# Patient Record
Sex: FEMALE | Race: WHITE | NOT HISPANIC OR LATINO | URBAN - METROPOLITAN AREA
[De-identification: names, ages, dates, MRNs, and addresses within clinical notes are randomized per-mention and may not be internally consistent; named-entity substitution may affect disease eponyms.]

---

## 2019-06-25 PROBLEM — I50.9 ACUTE DECOMPENSATED HEART FAILURE: Status: ACTIVE | Noted: 2019-06-25

## 2019-06-25 NOTE — PLAN OF CARE
(Physician in Lead of Transfers)   Outside Transfer Acceptance Note / Regional Referral Center    Transferring Physician: Dr. Melisa Garcia    Accepting Physician: Lashay Suárez MD    Date of Acceptance: 06/25/2019    Transferring Facility: [facility] in [place], [place]    Reason for Transfer: decompensated liver failure    Report from Transferring Physician/Hospital course: Pt is a 38 female with PMH of alcoholic cirrhosis (last drink 6/3) who was admitted to OSH 6/6 with abdominal pain, nausea, decreased oral intake, and visibly jaundice.  Over past 2.5 weeks, pt has been diagnosed with and treated for SBP.  She has been continued on diuretics, but ascites has been resistant to this, requiring multiple paracenteses.  Most recent paracentesis did not meet criteria for SBP, and had no PMNs, but pt remains on Rocephin for leukocytosis of unclear source.  She has been encephalopathic but now improving with increased doses of Lactulose and Rifaxamin.  Pt has not improved with current treatment, thus requesting liver transplant evaluation. Case d/w Dr. Posadas    VS:  T 97   bp 96/57   HR 80   RR 20   98% on RA    Labs:  WBC 21 (down-trending from 26), H/H 10.3/27.5, PLT 71  INR 1.9  Na 124, K 3.9, BUN/creat 76/0.9  TB 25.9 (DB 16.9), Alb 2.7, TP 6.5, , AST 92, ALT 15    To Do List: admit to IM-L if possible; continue diuretics, lactulose, Rifaxamin; consult Hepatology, consult Addiction Psychiatry, vit K x 3    Upon patient arrival to floor, please call extension [phone] (if no answer, this will flip to a beeper, so enter your call back number) for Hospital Medicine admit team assignment and for additional admit orders for the patient.  Do not page the attending physician associated with the patient on arrival (this physician may not be on duty at the time of arrival).  Rather, always call [phone] to reach the triage physician for orders and team assignment.    Lashay Suárez MD  [facility]  Medicine Staff

## 2019-06-26 ENCOUNTER — HOSPITAL ENCOUNTER (INPATIENT)
Facility: HOSPITAL | Age: 39
LOS: 9 days | Discharge: HOME | DRG: 441 | End: 2019-07-05
Attending: HOSPITALIST | Admitting: HOSPITALIST
Payer: COMMERCIAL

## 2019-06-26 DIAGNOSIS — Z76.82 ORGAN TRANSPLANT CANDIDATE: ICD-10-CM

## 2019-06-26 DIAGNOSIS — K72.00 ACUTE LIVER FAILURE WITHOUT HEPATIC COMA: ICD-10-CM

## 2019-06-26 DIAGNOSIS — K70.31 ALCOHOLIC CIRRHOSIS OF LIVER WITH ASCITES: ICD-10-CM

## 2019-06-26 DIAGNOSIS — K92.0 COFFEE GROUND EMESIS: Primary | ICD-10-CM

## 2019-06-26 DIAGNOSIS — Z01.818 PRE-TRANSPLANT EVALUATION FOR LIVER TRANSPLANT: ICD-10-CM

## 2019-06-26 DIAGNOSIS — I50.9 ACUTE DECOMPENSATED HEART FAILURE: ICD-10-CM

## 2019-06-26 DIAGNOSIS — F51.04 CHRONIC INSOMNIA: ICD-10-CM

## 2019-06-26 DIAGNOSIS — K72.90 LIVER FAILURE: ICD-10-CM

## 2019-06-26 DIAGNOSIS — F10.20 ALCOHOL USE DISORDER, SEVERE, DEPENDENCE: ICD-10-CM

## 2019-06-26 PROBLEM — D68.9 COAGULOPATHY: Status: ACTIVE | Noted: 2019-06-26

## 2019-06-26 PROBLEM — K70.11 ASCITES DUE TO ALCOHOLIC HEPATITIS: Status: ACTIVE | Noted: 2019-06-26

## 2019-06-26 PROBLEM — D68.4 DEFICIENCY OF COAGULATION FACTOR DUE TO LIVER DISEASE: Status: ACTIVE | Noted: 2019-06-26

## 2019-06-26 PROBLEM — K70.10 ACUTE ALCOHOLIC HEPATITIS: Status: ACTIVE | Noted: 2019-06-26

## 2019-06-26 PROBLEM — D53.9 MACROCYTIC ANEMIA: Status: ACTIVE | Noted: 2019-06-26

## 2019-06-26 PROBLEM — R60.1 ANASARCA: Status: ACTIVE | Noted: 2019-06-26

## 2019-06-26 PROBLEM — K76.82 HEPATIC ENCEPHALOPATHY: Status: ACTIVE | Noted: 2019-06-26

## 2019-06-26 PROBLEM — I95.9 HYPOTENSION: Status: ACTIVE | Noted: 2019-06-26

## 2019-06-26 PROBLEM — D73.1 THROMBOCYTOPENIA DUE TO HYPERSPLENISM: Status: ACTIVE | Noted: 2019-06-26

## 2019-06-26 PROBLEM — E87.1 HYPONATREMIA: Status: ACTIVE | Noted: 2019-06-26

## 2019-06-26 PROBLEM — D72.829 LEUKOCYTOSIS: Status: ACTIVE | Noted: 2019-06-26

## 2019-06-26 PROBLEM — D69.59 THROMBOCYTOPENIA DUE TO HYPERSPLENISM: Status: ACTIVE | Noted: 2019-06-26

## 2019-06-26 PROBLEM — F17.200 TOBACCO USE DISORDER: Status: ACTIVE | Noted: 2019-06-26

## 2019-06-26 LAB
ABO + RH BLD: NORMAL
ALBUMIN FLD-MCNC: <0.3 G/DL
ALBUMIN SERPL BCP-MCNC: 2 G/DL (ref 3.5–5.2)
ALP SERPL-CCNC: 345 U/L (ref 55–135)
ALT SERPL W/O P-5'-P-CCNC: 15 U/L (ref 10–44)
AMPHET+METHAMPHET UR QL: NEGATIVE
ANION GAP SERPL CALC-SCNC: 10 MMOL/L (ref 8–16)
ANION GAP SERPL CALC-SCNC: 12 MMOL/L (ref 8–16)
ANISOCYTOSIS BLD QL SMEAR: SLIGHT
APPEARANCE FLD: CLEAR
AST SERPL-CCNC: 96 U/L (ref 10–40)
B-HCG UR QL: NEGATIVE
BARBITURATES UR QL SCN>200 NG/ML: NEGATIVE
BASOPHILS # BLD AUTO: 0.1 K/UL (ref 0–0.2)
BASOPHILS NFR BLD: 0.4 % (ref 0–1.9)
BENZODIAZ UR QL SCN>200 NG/ML: NEGATIVE
BILIRUB DIRECT SERPL-MCNC: >14 MG/DL (ref 0.1–0.3)
BILIRUB SERPL-MCNC: 28.4 MG/DL (ref 0.1–1)
BLD GP AB SCN CELLS X3 SERPL QL: NORMAL
BODY FLD TYPE: NORMAL
BUN SERPL-MCNC: 64 MG/DL (ref 6–20)
BUN SERPL-MCNC: 64 MG/DL (ref 6–20)
BURR CELLS BLD QL SMEAR: ABNORMAL
BZE UR QL SCN: NEGATIVE
CALCIUM SERPL-MCNC: 9.6 MG/DL (ref 8.7–10.5)
CALCIUM SERPL-MCNC: 9.7 MG/DL (ref 8.7–10.5)
CANNABINOIDS UR QL SCN: NEGATIVE
CHLORIDE SERPL-SCNC: 86 MMOL/L (ref 95–110)
CHLORIDE SERPL-SCNC: 88 MMOL/L (ref 95–110)
CO2 SERPL-SCNC: 18 MMOL/L (ref 23–29)
CO2 SERPL-SCNC: 22 MMOL/L (ref 23–29)
COLOR FLD: YELLOW
CREAT SERPL-MCNC: 1.3 MG/DL (ref 0.5–1.4)
CREAT SERPL-MCNC: 1.6 MG/DL (ref 0.5–1.4)
CREAT UR-MCNC: 53 MG/DL (ref 15–325)
CREAT UR-MCNC: 53 MG/DL (ref 15–325)
DIFFERENTIAL METHOD: ABNORMAL
EOSINOPHIL # BLD AUTO: 0.4 K/UL (ref 0–0.5)
EOSINOPHIL NFR BLD: 1.6 % (ref 0–8)
ERYTHROCYTE [DISTWIDTH] IN BLOOD BY AUTOMATED COUNT: 17.4 % (ref 11.5–14.5)
EST. GFR  (AFRICAN AMERICAN): 46.8 ML/MIN/1.73 M^2
EST. GFR  (AFRICAN AMERICAN): >60 ML/MIN/1.73 M^2
EST. GFR  (NON AFRICAN AMERICAN): 40.6 ML/MIN/1.73 M^2
EST. GFR  (NON AFRICAN AMERICAN): 52.2 ML/MIN/1.73 M^2
ETHANOL UR-MCNC: <10 MG/DL
FIBRINOGEN PPP-MCNC: 287 MG/DL (ref 182–366)
GLUCOSE SERPL-MCNC: 72 MG/DL (ref 70–110)
GLUCOSE SERPL-MCNC: 99 MG/DL (ref 70–110)
GRAM STN SPEC: NORMAL
GRAM STN SPEC: NORMAL
HAV IGM SERPL QL IA: NEGATIVE
HBV CORE IGM SERPL QL IA: NEGATIVE
HBV SURFACE AG SERPL QL IA: NEGATIVE
HCT VFR BLD AUTO: 30.3 % (ref 37–48.5)
HCV AB SERPL QL IA: NEGATIVE
HGB BLD-MCNC: 10.4 G/DL (ref 12–16)
HIV 1+2 AB+HIV1 P24 AG SERPL QL IA: NEGATIVE
IGA SERPL-MCNC: 998 MG/DL (ref 40–350)
IGG SERPL-MCNC: 1140 MG/DL (ref 650–1600)
IGM SERPL-MCNC: 120 MG/DL (ref 50–300)
IMM GRANULOCYTES # BLD AUTO: 0.22 K/UL (ref 0–0.04)
IMM GRANULOCYTES NFR BLD AUTO: 0.9 % (ref 0–0.5)
INR PPP: 2.4 (ref 0.8–1.2)
LDH SERPL L TO P-CCNC: 211 U/L (ref 110–260)
LYMPHOCYTES # BLD AUTO: 1.1 K/UL (ref 1–4.8)
LYMPHOCYTES NFR BLD: 4.7 % (ref 18–48)
LYMPHOCYTES NFR FLD MANUAL: 25 %
MAGNESIUM SERPL-MCNC: 2.1 MG/DL (ref 1.6–2.6)
MCH RBC QN AUTO: 34.9 PG (ref 27–31)
MCHC RBC AUTO-ENTMCNC: 34.3 G/DL (ref 32–36)
MCV RBC AUTO: 102 FL (ref 82–98)
MESOTHL CELL NFR FLD MANUAL: 25 %
METHADONE UR QL SCN>300 NG/ML: NEGATIVE
MONOCYTES # BLD AUTO: 1.2 K/UL (ref 0.3–1)
MONOCYTES NFR BLD: 5.3 % (ref 4–15)
MONOS+MACROS NFR FLD MANUAL: 31 %
NEUTROPHILS # BLD AUTO: 20.4 K/UL (ref 1.8–7.7)
NEUTROPHILS NFR BLD: 87.1 % (ref 38–73)
NEUTROPHILS NFR FLD MANUAL: 19 %
NRBC BLD-RTO: 0 /100 WBC
OPIATES UR QL SCN: NORMAL
OSMOLALITY SERPL: 274 MOSM/KG (ref 275–295)
OSMOLALITY UR: 331 MOSM/KG (ref 50–1200)
PCP UR QL SCN>25 NG/ML: NEGATIVE
PHOSPHATE SERPL-MCNC: 6.2 MG/DL (ref 2.7–4.5)
PLATELET # BLD AUTO: 54 K/UL (ref 150–350)
PLATELET BLD QL SMEAR: ABNORMAL
PMV BLD AUTO: 13.7 FL (ref 9.2–12.9)
POIKILOCYTOSIS BLD QL SMEAR: SLIGHT
POTASSIUM SERPL-SCNC: 3.9 MMOL/L (ref 3.5–5.1)
POTASSIUM SERPL-SCNC: 4 MMOL/L (ref 3.5–5.1)
PROCALCITONIN SERPL IA-MCNC: 0.4 NG/ML
PROT FLD-MCNC: <1 G/DL
PROT SERPL-MCNC: 5.8 G/DL (ref 6–8.4)
PROTHROMBIN TIME: 23.4 SEC (ref 9–12.5)
RBC # BLD AUTO: 2.98 M/UL (ref 4–5.4)
SCHISTOCYTES BLD QL SMEAR: PRESENT
SODIUM SERPL-SCNC: 118 MMOL/L (ref 136–145)
SODIUM SERPL-SCNC: 118 MMOL/L (ref 136–145)
SODIUM UR-SCNC: <20 MMOL/L (ref 20–250)
SPECIMEN SOURCE: NORMAL
SPECIMEN SOURCE: NORMAL
TOXICOLOGY INFORMATION: NORMAL
WBC # BLD AUTO: 23.39 K/UL (ref 3.9–12.7)
WBC # FLD: 20 /CU MM

## 2019-06-26 PROCEDURE — 83615 LACTATE (LD) (LDH) ENZYME: CPT

## 2019-06-26 PROCEDURE — 84157 ASSAY OF PROTEIN OTHER: CPT

## 2019-06-26 PROCEDURE — 99222 1ST HOSP IP/OBS MODERATE 55: CPT | Mod: ,,, | Performed by: INTERNAL MEDICINE

## 2019-06-26 PROCEDURE — 93005 ELECTROCARDIOGRAM TRACING: CPT

## 2019-06-26 PROCEDURE — 36410 VNPNXR 3YR/> PHY/QHP DX/THER: CPT

## 2019-06-26 PROCEDURE — 84100 ASSAY OF PHOSPHORUS: CPT

## 2019-06-26 PROCEDURE — 82784 ASSAY IGA/IGD/IGG/IGM EACH: CPT | Mod: 59

## 2019-06-26 PROCEDURE — 82306 VITAMIN D 25 HYDROXY: CPT

## 2019-06-26 PROCEDURE — 93010 ELECTROCARDIOGRAM REPORT: CPT | Mod: ,,, | Performed by: INTERNAL MEDICINE

## 2019-06-26 PROCEDURE — 87040 BLOOD CULTURE FOR BACTERIA: CPT | Mod: 59

## 2019-06-26 PROCEDURE — 20600001 HC STEP DOWN PRIVATE ROOM

## 2019-06-26 PROCEDURE — 25000003 PHARM REV CODE 250: Performed by: PHYSICIAN ASSISTANT

## 2019-06-26 PROCEDURE — 86704 HEP B CORE ANTIBODY TOTAL: CPT

## 2019-06-26 PROCEDURE — 83735 ASSAY OF MAGNESIUM: CPT

## 2019-06-26 PROCEDURE — 89051 BODY FLUID CELL COUNT: CPT

## 2019-06-26 PROCEDURE — 63600175 PHARM REV CODE 636 W HCPCS: Performed by: HOSPITALIST

## 2019-06-26 PROCEDURE — 86850 RBC ANTIBODY SCREEN: CPT

## 2019-06-26 PROCEDURE — 82042 OTHER SOURCE ALBUMIN QUAN EA: CPT

## 2019-06-26 PROCEDURE — 82248 BILIRUBIN DIRECT: CPT

## 2019-06-26 PROCEDURE — 86790 VIRUS ANTIBODY NOS: CPT

## 2019-06-26 PROCEDURE — 84134 ASSAY OF PREALBUMIN: CPT

## 2019-06-26 PROCEDURE — 99222 PR INITIAL HOSPITAL CARE,LEVL II: ICD-10-PCS | Mod: ,,, | Performed by: INTERNAL MEDICINE

## 2019-06-26 PROCEDURE — 86706 HEP B SURFACE ANTIBODY: CPT

## 2019-06-26 PROCEDURE — 85610 PROTHROMBIN TIME: CPT

## 2019-06-26 PROCEDURE — 83930 ASSAY OF BLOOD OSMOLALITY: CPT

## 2019-06-26 PROCEDURE — 86592 SYPHILIS TEST NON-TREP QUAL: CPT

## 2019-06-26 PROCEDURE — 80074 ACUTE HEPATITIS PANEL: CPT

## 2019-06-26 PROCEDURE — 99223 1ST HOSP IP/OBS HIGH 75: CPT | Mod: ,,, | Performed by: HOSPITALIST

## 2019-06-26 PROCEDURE — 99223 PR INITIAL HOSPITAL CARE,LEVL III: ICD-10-PCS | Mod: ,,, | Performed by: HOSPITALIST

## 2019-06-26 PROCEDURE — 80048 BASIC METABOLIC PNL TOTAL CA: CPT

## 2019-06-26 PROCEDURE — 85384 FIBRINOGEN ACTIVITY: CPT

## 2019-06-26 PROCEDURE — C1751 CATH, INF, PER/CENT/MIDLINE: HCPCS

## 2019-06-26 PROCEDURE — 36415 COLL VENOUS BLD VENIPUNCTURE: CPT

## 2019-06-26 PROCEDURE — 76937 US GUIDE VASCULAR ACCESS: CPT

## 2019-06-26 PROCEDURE — 86038 ANTINUCLEAR ANTIBODIES: CPT

## 2019-06-26 PROCEDURE — 86920 COMPATIBILITY TEST SPIN: CPT

## 2019-06-26 PROCEDURE — 25000003 PHARM REV CODE 250: Performed by: HOSPITALIST

## 2019-06-26 PROCEDURE — 80053 COMPREHEN METABOLIC PANEL: CPT

## 2019-06-26 PROCEDURE — 85025 COMPLETE CBC W/AUTO DIFF WBC: CPT

## 2019-06-26 PROCEDURE — 99000 SPECIMEN HANDLING OFFICE-LAB: CPT

## 2019-06-26 PROCEDURE — 93010 EKG 12-LEAD: ICD-10-PCS | Mod: ,,, | Performed by: INTERNAL MEDICINE

## 2019-06-26 PROCEDURE — 86682 HELMINTH ANTIBODY: CPT

## 2019-06-26 PROCEDURE — 87205 SMEAR GRAM STAIN: CPT

## 2019-06-26 PROCEDURE — 86703 HIV-1/HIV-2 1 RESULT ANTBDY: CPT

## 2019-06-26 PROCEDURE — 80321 ALCOHOLS BIOMARKERS 1OR 2: CPT

## 2019-06-26 PROCEDURE — 86787 VARICELLA-ZOSTER ANTIBODY: CPT

## 2019-06-26 PROCEDURE — 84145 PROCALCITONIN (PCT): CPT

## 2019-06-26 PROCEDURE — 84300 ASSAY OF URINE SODIUM: CPT

## 2019-06-26 PROCEDURE — 86644 CMV ANTIBODY: CPT

## 2019-06-26 PROCEDURE — 86235 NUCLEAR ANTIGEN ANTIBODY: CPT

## 2019-06-26 PROCEDURE — 81025 URINE PREGNANCY TEST: CPT

## 2019-06-26 PROCEDURE — 80307 DRUG TEST PRSMV CHEM ANLYZR: CPT

## 2019-06-26 PROCEDURE — 86256 FLUORESCENT ANTIBODY TITER: CPT | Mod: 91

## 2019-06-26 PROCEDURE — 83935 ASSAY OF URINE OSMOLALITY: CPT

## 2019-06-26 RX ORDER — LIDOCAINE 50 MG/G
2 PATCH TOPICAL
Status: DISCONTINUED | OUTPATIENT
Start: 2019-06-26 | End: 2019-07-05 | Stop reason: HOSPADM

## 2019-06-26 RX ORDER — THIAMINE HCL 100 MG
100 TABLET ORAL DAILY
Status: DISCONTINUED | OUTPATIENT
Start: 2019-06-26 | End: 2019-07-05 | Stop reason: HOSPADM

## 2019-06-26 RX ORDER — ACETAMINOPHEN 325 MG/1
325 TABLET ORAL EVERY 8 HOURS PRN
Status: DISCONTINUED | OUTPATIENT
Start: 2019-06-26 | End: 2019-07-05 | Stop reason: HOSPADM

## 2019-06-26 RX ORDER — IBUPROFEN 200 MG
16 TABLET ORAL
Status: DISCONTINUED | OUTPATIENT
Start: 2019-06-26 | End: 2019-07-05 | Stop reason: HOSPADM

## 2019-06-26 RX ORDER — SYRING-NEEDL,DISP,INSUL,0.3 ML 29 G X1/2"
296 SYRINGE, EMPTY DISPOSABLE MISCELLANEOUS ONCE
Status: ON HOLD | COMMUNITY
End: 2019-07-05 | Stop reason: HOSPADM

## 2019-06-26 RX ORDER — MIDODRINE HYDROCHLORIDE 5 MG/1
5 TABLET ORAL 3 TIMES DAILY
Status: DISCONTINUED | OUTPATIENT
Start: 2019-06-26 | End: 2019-06-27

## 2019-06-26 RX ORDER — SPIRONOLACTONE 100 MG/1
100 TABLET, FILM COATED ORAL DAILY
Status: ON HOLD | COMMUNITY
End: 2019-07-05 | Stop reason: HOSPADM

## 2019-06-26 RX ORDER — RAMELTEON 8 MG/1
8 TABLET ORAL NIGHTLY PRN
Status: DISCONTINUED | OUTPATIENT
Start: 2019-06-26 | End: 2019-07-05 | Stop reason: HOSPADM

## 2019-06-26 RX ORDER — TRAMADOL HYDROCHLORIDE 50 MG/1
50 TABLET ORAL EVERY 8 HOURS PRN
Status: DISCONTINUED | OUTPATIENT
Start: 2019-06-26 | End: 2019-06-26

## 2019-06-26 RX ORDER — OMEPRAZOLE 20 MG/1
20 CAPSULE, DELAYED RELEASE ORAL DAILY
COMMUNITY

## 2019-06-26 RX ORDER — POLYETHYLENE GLYCOL 3350 17 G/17G
17 POWDER, FOR SOLUTION ORAL 2 TIMES DAILY PRN
Status: DISCONTINUED | OUTPATIENT
Start: 2019-06-26 | End: 2019-07-05 | Stop reason: HOSPADM

## 2019-06-26 RX ORDER — MULTIVITAMIN
1 TABLET ORAL DAILY
COMMUNITY

## 2019-06-26 RX ORDER — PANTOPRAZOLE SODIUM 40 MG/1
40 TABLET, DELAYED RELEASE ORAL DAILY
Status: DISCONTINUED | OUTPATIENT
Start: 2019-06-27 | End: 2019-06-27

## 2019-06-26 RX ORDER — SODIUM CHLORIDE 0.9 % (FLUSH) 0.9 %
5 SYRINGE (ML) INJECTION
Status: DISCONTINUED | OUTPATIENT
Start: 2019-06-26 | End: 2019-07-05 | Stop reason: HOSPADM

## 2019-06-26 RX ORDER — CIPROFLOXACIN 500 MG/1
500 TABLET ORAL EVERY 24 HOURS
Status: DISCONTINUED | OUTPATIENT
Start: 2019-06-26 | End: 2019-06-27

## 2019-06-26 RX ORDER — GLUCAGON 1 MG
1 KIT INJECTION
Status: DISCONTINUED | OUTPATIENT
Start: 2019-06-26 | End: 2019-07-05 | Stop reason: HOSPADM

## 2019-06-26 RX ORDER — TRAMADOL HYDROCHLORIDE 50 MG/1
50 TABLET ORAL EVERY 6 HOURS PRN
Status: DISCONTINUED | OUTPATIENT
Start: 2019-06-26 | End: 2019-07-05 | Stop reason: HOSPADM

## 2019-06-26 RX ORDER — IBUPROFEN 200 MG
24 TABLET ORAL
Status: DISCONTINUED | OUTPATIENT
Start: 2019-06-26 | End: 2019-07-05 | Stop reason: HOSPADM

## 2019-06-26 RX ORDER — FOLIC ACID 1 MG/1
1 TABLET ORAL DAILY
Status: DISCONTINUED | OUTPATIENT
Start: 2019-06-26 | End: 2019-07-05 | Stop reason: HOSPADM

## 2019-06-26 RX ORDER — LACTULOSE 10 G/15ML
20 SOLUTION ORAL 3 TIMES DAILY
Status: DISCONTINUED | OUTPATIENT
Start: 2019-06-26 | End: 2019-06-27

## 2019-06-26 RX ORDER — PROMETHAZINE HYDROCHLORIDE 12.5 MG/1
12.5 TABLET ORAL EVERY 6 HOURS PRN
Status: DISCONTINUED | OUTPATIENT
Start: 2019-06-26 | End: 2019-07-05 | Stop reason: HOSPADM

## 2019-06-26 RX ADMIN — PROMETHAZINE HYDROCHLORIDE 12.5 MG: 12.5 TABLET ORAL at 09:06

## 2019-06-26 RX ADMIN — MIDODRINE HYDROCHLORIDE 5 MG: 5 TABLET ORAL at 09:06

## 2019-06-26 RX ADMIN — TRAMADOL HYDROCHLORIDE 50 MG: 50 TABLET, FILM COATED ORAL at 02:06

## 2019-06-26 RX ADMIN — RAMELTEON 8 MG: 8 TABLET, FILM COATED ORAL at 09:06

## 2019-06-26 RX ADMIN — RIFAXIMIN 550 MG: 550 TABLET ORAL at 09:06

## 2019-06-26 RX ADMIN — CIPROFLOXACIN HYDROCHLORIDE 500 MG: 500 TABLET, FILM COATED ORAL at 07:06

## 2019-06-26 RX ADMIN — LACTULOSE 20 G: 20 SOLUTION ORAL at 05:06

## 2019-06-26 RX ADMIN — LACTULOSE 20 G: 20 SOLUTION ORAL at 09:06

## 2019-06-26 RX ADMIN — PROMETHAZINE HYDROCHLORIDE 12.5 MG: 12.5 TABLET ORAL at 02:06

## 2019-06-26 RX ADMIN — Medication 100 MG: at 11:06

## 2019-06-26 RX ADMIN — PHYTONADIONE 10 MG: 10 INJECTION, EMULSION INTRAMUSCULAR; INTRAVENOUS; SUBCUTANEOUS at 11:06

## 2019-06-26 RX ADMIN — MIDODRINE HYDROCHLORIDE 5 MG: 5 TABLET ORAL at 05:06

## 2019-06-26 RX ADMIN — FOLIC ACID 1 MG: 1 TABLET ORAL at 11:06

## 2019-06-26 RX ADMIN — TRAMADOL HYDROCHLORIDE 50 MG: 50 TABLET, FILM COATED ORAL at 09:06

## 2019-06-26 NOTE — H&P
History and Physical   Hospital Medicine     Patient Name: Destinee Gallagher  MRN:  [id number]  Hospital Medicine Team: Networked reference to record PCT  Marisa Banks MD  Date of Admission:  6/26/2019     Principal Problem:  Acute liver failure without hepatic coma   Primary Care Physician: Harsha Larson      History of Present Illness:     Ms. Destinee Gallagher is a 38 y.o. female with hx of ETOH cirrhosis and ETOH hepatitis, hx of alcohol abuse disorder/ severe alcohol dependence, prior history of coagulopathy and thrombocytopenia, as well as history of SBP, [place] [facility] [place] as a transfer from  [facility] in [place], [place], for liver transplantation evaluation as well as hepatology evaluation. Patient was admitted to OSH 6/6 with abdominal pain, nausea, decreased oral intake, and visibly jaundice.  Over past 2.5 weeks there , pt has been diagnosed with and treated for SBP.  She has been continued on diuretics, but ascites has been resistant to diuretics , requiring multiple paracenteses (last LVP several days prior to transfer).  Most recent paracentesis did not meet criteria for SBP, and had no PMNs, but pt remained on Rocephin for leukocytosis of unclear source.  She has been encephalopathic but slowly improving with increased doses of Lactulose and Rifaxamin.  Pt has not improved with current treatment, thus requesting liver transplant evaluation, Case d/w Dr. Posadas, patient transferred to [facility]. MELD 35 upon presentation at [facility]. Last ETOH drink on 6/3    Upon presentation at [facility], patient was afebrile with systolic blood pressures in the 90s satting 95% on room air.  She was mildly encephalopathic, however was able to participate in conversation,  at bedside.  Meld 35 on presentation.  Labs notable for total bilirubin of 28, sodium of 118 (patient has been getting in diuretics at the outside hospital), INR of 2.4.  WBC of 23, platelets 54.     Upon further  questioning regarding patient's disease process, she stated that she has had history of heavy alcohol use, most recently 1-2 pt of vodka per day.  Patient's last alcoholic drink was on 06/03/2019, approximately.  Patient did not state that she has been binge drinking, as noted above 2 pt of vodka per day, prior to her admission at outside hospital.  Patient also uses tobacco.  She stated that she was 1st diagnosed with alcoholic cirrhosis and alcoholic hepatitis in August 2018 and was advised to stop drinking at that time.  Patient has had multiple relapses since then.  It appears the patient has failed multiple alcohol rehabs in the past.  In the last couple of months patient has had recurrent problem with worsening ascites and abdominal distention.    Review of Systems   Constitutional: Negative for chills, , fever. +fatigue  HENT: Negative for sore throat, trouble swallowing.    Eyes: Negative for photophobia, visual disturbance.   Respiratory: Negative for cough, shortness of breath.    Cardiovascular: Negative for chest pain, palpitations, leg swelling.   Gastrointestinal: Negative for , constipation, diarrhea, nausea, vomiting. + abdominal pain, abd distension   Endocrine: Negative for cold intolerance, heat intolerance.   Genitourinary: Negative for dysuria, frequency.   Musculoskeletal: Negative for arthralgias, myalgias.   Skin: Negative for rash, wound, erythema   + jaundice   Neurological: Negative for dizziness, syncope,    +weakness, light-headedness.   Psychiatric/Behavioral: Negative for confusion, hallucinations, anxiety  All other systems reviewed and are negative.      Past Medical History:   Past Medical History:   Diagnosis Date    Acute alcoholic hepatitis 6/26/2019    Acute liver failure without hepatic coma 6/25/2019    Alcohol use disorder, severe, dependence 6/26/2019    Coagulopathy 6/26/2019    Hepatic encephalopathy 6/26/2019    Hyponatremia 6/26/2019    Thrombocytopenia due to  hypersplenism 2019       Past Surgical History:   Past Surgical History:   Procedure Laterality Date     SECTION      CHOLECYSTECTOMY         Social History:   Social History     Tobacco Use    Smoking status: Current Every Day Smoker     Packs/day: 1.00     Years: 15.00     Pack years: 15.00     Types: Cigarettes   Substance Use Topics    Alcohol use: Not Currently     Comment: 19 last drink    Drug use: Never       Family History:   History reviewed. No pertinent family history.      Medications: Scheduled Meds:   ciprofloxacin HCl  500 mg Oral Daily    folic acid  1 mg Oral Daily    lactulose  20 g Oral TID    midodrine  5 mg Oral TID    [START ON 2019] pantoprazole  40 mg Oral Daily    phytonadione ((AQUA-MEPHYTON) IVPB  10 mg Intravenous Daily    rifAXImin  550 mg Oral BID    thiamine  100 mg Oral Daily     Continuous Infusions:  PRN Meds:.acetaminophen, Dextrose 10% Bolus, Dextrose 10% Bolus, glucagon (human recombinant), glucose, glucose, polyethylene glycol, promethazine, ramelteon, sodium chloride 0.9%, traMADol    Allergies: Patient is allergic to zofran [ondansetron hcl (pf)].    Physical Exam:     Vital Signs (Most Recent):  Temp: 97 °F (36.1 °C) (19 1516)  Pulse: 80 (19 1605)  Resp: 18 (19 1516)  BP: (!) 105/55 (19 1516)  SpO2: 98 % (19 1516) Vital Signs Range (Last 24H):  Temp:  [96.6 °F (35.9 °C)-97 °F (36.1 °C)]   Pulse:  [80-86]   Resp:  [18-20]   BP: ()/(53-59)   SpO2:  [95 %-98 %]    Body mass index is 23.31 kg/m².     Physical Exam:  Constitutional: appears older than stated age, chronically ill looking,  non-distressed, not diaphoretic.   HENT: NC/AT, external ears normal, oropharynx clear, MMM w/o exudates.   Eyes: PERRL, EOMI, conjunctiva normal, no discharge b/l, +scleral icterus   Neck: normal ROM, supple  CV: RRR, no m/r/g, no carotid bruits, +2 peripheral pulses.  Pulmonary/Chest wall: Breathing comfortably w/o  distress, CTAB, no w/r/r, no crackles.  Decreased breath sounds at lung bases  GI: Soft, non-tender, (+) BS, (+) BM   +distended, tesne   Musculoskeletal: Normal ROM, no atrophy,  + pitting edema in LE bilaterally   Neurological: AAO x 4, CN II-XI in tact, nl sensation, nl strength/tone  No asterixis   Skin: warm, dry   + jaundice, + significant spider angiomas, +bruising   Psych: normal mood and affect, normal behavior, thought content and judgement.    Labs:    Chemistries:   Recent Labs   Lab 06/26/19  0802   *   K 4.0   CL 88*   CO2 18*   BUN 64*   CREATININE 1.3   CALCIUM 9.7   PROT 5.8*   BILITOT 28.4*   ALKPHOS 345*   ALT 15   AST 96*   MG 2.1   PHOS 6.2*        WBC:   Recent Labs   Lab 06/26/19  0802   WBC 23.39*     Bands:     CBC/Anemia Labs: Coags:    Recent Labs   Lab 06/26/19  0802   WBC 23.39*   HGB 10.4*   HCT 30.3*   PLT 54*   *   RDW 17.4*    Recent Labs   Lab 06/26/19  0802   INR 2.4*            Assessment and Plan:     Ms. Destinee Gallagher is a 38 y.o. 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       Acute liver failure without hepatic coma  Acute alcoholic hepatitis with ascites  Alcoholic  cirrhosis of liver with ascites    MELD-Na score: 35 at 6/26/2019  8:02 AM  MELD score: 31 at 6/26/2019  8:02 AM  Calculated from:  Serum Creatinine: 1.3 mg/dL at 6/26/2019  8:02 AM  Serum Sodium: 118 mmol/L (Rounded to 125 mmol/L) at 6/26/2019  8:02 AM  Total Bilirubin: 28.4 mg/dL at 6/26/2019  8:02 AM  INR(ratio): 2.4 at 6/26/2019  8:02 AM  Age: 38 years    - decompensation due to recent alcohol binge drinking.  Meld score of 35 on admission  - hepatology consulted.  Obtaining financial approval for initiation of inpatient liver transplant evaluation  - PETH ordered and pending  - viral and autoimmune markers ordered and pending  - check urine culture and blood cultures  - serology is ordered and pending.  - obtain LVP on 06/26/2019, therapeutic as well as diagnostic paracentesis.  If negative for SBP, patient will need to be on p.o. ciprofloxacin prophylaxis given history of SBP in the past  - treat hypotension with midodrine. removed 3.4L  - p.o. Lactulose and PO rifaximin for hepatic encephalopathy  - IV vitamin K for coagulopathy  - Psychiatry consulted on the case.    Chronic hepatic encephalopathy  - PO lactulose  - PO rifaximin  - monitor BMs, goal of 3-4 . Monitor mental status     Alcoh ol use disorder, severe, dependence  - with history of alcoholic cirrhosis and alcoholic hepatitis leading to acute liver failure  - Psychiatry consulted on the case.  - patient may be a candidate [facility]  - urine toxicology ordered  - PET ordered    Hyponatremia  - sodium of 118 on admission, patient has been getting diuretics at outside hospital.  - hold diuretics at this time  - check serum Osmo and urine studies.  - fluid restriction to 1 L per day  - given hypotension hyponatremia and relatively normal potassium, will check AM cortisol level, for evaluation of possible adrenal insufficiency    Hypotension  - likely due to liver failure and decompensated hepatic cirrhosis  - patient  started on midodrine 5 mg t.i.d.  - monitor blood pressures in escalate midodrine as necessary    Leukocytosis  - WBC count of 23 on admission.  Very likely due to alcoholic hepatitis.  WBC of 21 down trending from 20/6 at outside hospital  - check blood cultures and urine cultures.  - evaluate for SBP with paracentesis  - antibiotics as above  - continue monitor    Anasarca  - likely due to liver failure and decompensated hepatic cirrhosis  - check 2D echo to evaluate condition of the heart    Macrocytic Anemia  Thrombocytopenia  Coagulopathy  - hemoglobin of 10 with MCV of 100 on admission.  - platelets of 54 on admission.  - INR of 2.4 on admission likely due to liver failure  - administer IV vitamin K x3 days for coagulopathy  - monitor platelets and hemoglobin daily  - check DIC and hemolysis studies    Tobacco use disorder  - patient is a current every day smoker  - will assess for necessity of nicotine patch     Diet:  Low Na, fluid restriction   GI PPx:  ppi  DVT PPx:  scds  Goals of Care:  full    High Risk Conditions:  Liver failure     Disposition:    Admitted to IM L  Initiating OLT eval   Tentatively 7/1    Signing Physician:     Marisa Banks MD  Department of Hospital Medicine   [facility]  Pager [phone]  Spectra [phone]  6/26/2019

## 2019-06-26 NOTE — PROCEDURES
Radiology Post-Procedure Note    Pre Op Diagnosis: Ascites  Post Op Diagnosis: Same    Procedure: Ultrasound Guided Paracentesis    Procedure performed by: SANDRA Cesar DNP  Written Informed Consent Obtained: Yes  Specimen Removed: YES clear yellow  Estimated Blood Loss: Minimal    Findings:   Successful paracentesis.  Albumin administered PRN per protocol.    Patient tolerated procedure well.

## 2019-06-26 NOTE — CONSULTS
Single lumen 18g x 10cm midline placed to rt brachial vein.  Max dwell date -7/25/19, Lot# [id number].  Needle advance into the vessel under real time ultrasound guidance.  Image recorded and saved.

## 2019-06-26 NOTE — PHARMACY MED REC
"Admission Medication Reconciliation - Pharmacy Consult Note    The home medication history was taken by Lelo Mitchell, Pharmacy Tech.     You may go to "Admission" then "Reconcile Home Medications" tabs to review and/or act upon these items.      No issues noted with the medication reconciliation.      Emily Meyer, PharmD, BCPS  [id number]                .    .          "

## 2019-06-26 NOTE — PLAN OF CARE
Problem: Adult Inpatient Plan of Care  Goal: Plan of Care Review  Outcome: Ongoing (interventions implemented as appropriate)  Pt AAx3-4, breathing even and unlabored, call light in reach, bed in lowest position. Pt reported pain and nausea intermittently, administered Prn medication per orders. Pt up to restroom with 's assistance.  at bedside throughout shift. Pt went for paracentesis, removed 3.4L. VSS, frequent hourly rounding completed. Will continue to monitor.

## 2019-06-26 NOTE — H&P
Radiology History & Physical      SUBJECTIVE:     Chief Complaint: abdominal distention    History of Present Illness:  Destinee Gallagher is a 38 y.o. female who presents for evaluation of ascites  History reviewed. No pertinent past medical history.  Past Surgical History:   Procedure Laterality Date     SECTION      CHOLECYSTECTOMY         Home Meds:   Prior to Admission medications    Medication Sig Start Date End Date Taking? Authorizing Provider   magnesium citrate solution Take 296 mLs by mouth once.   Yes Historical Provider, MD   multivitamin (THERAGRAN) per tablet Take 1 tablet by mouth once daily.   Yes Historical Provider, MD   omeprazole (PRILOSEC) 20 MG capsule Take 20 mg by mouth once daily.   Yes Historical Provider, MD   spironolactone (ALDACTONE) 100 MG tablet Take 100 mg by mouth once daily.   Yes Historical Provider, MD   ranitidine (ZANTAC) 75 MG tablet Take 75 mg by mouth 2 (two) times daily.  19 Yes Historical Provider, MD     Anticoagulants/Antiplatelets: no anticoagulation    Allergies:   Review of patient's allergies indicates:   Allergen Reactions    Zofran [ondansetron hcl (pf)]      headache     Sedation History:  no adverse reactions    Review of Systems:   Hematological: no known coagulopathies  Respiratory: no shortness of breath  Cardiovascular: no chest pain  Gastrointestinal: no abdominal pain  Genito-Urinary: no dysuria  Musculoskeletal: negative  Neurological: no TIA or stroke symptoms         OBJECTIVE:     Vital Signs (Most Recent)  Temp: 97 °F (36.1 °C) (19 1516)  Pulse: 84 (19 151)  Resp: 18 (19 1516)  BP: (!) 105/55 (19 1516)  SpO2: 98 % (19 151)    Physical Exam:  ASA: 3  Mallampati: na    General: no acute distress  Mental Status: alert and oriented to person, place and time  HEENT: normocephalic, atraumatic  Chest: unlabored breathing  Heart: regular heart rate  Abdomen: distended  Extremity: moves all  extremities    ASSESSMENT/PLAN:     Sedation Plan: local anesthesia  Patient will undergo US guided paracentesis

## 2019-06-26 NOTE — PLAN OF CARE
[facility] following for D/C needs. SW is in communication with patient's CM and patient's Care Team - IML. [facility] will continue to follow.      06/26/19 5753   Post-Acute Status   Post-Acute Authorization Other   Other Status No Post-Acute Service Needs     Sherri Payton LMSW   - [facility]  Ext. [phone]

## 2019-06-26 NOTE — NURSING
Pt arrived to room 8096. Oriented pt to room. AVSS. Pt c/o of back pain. Paged [phone]. Awaiting return call. Call light in reach.

## 2019-06-26 NOTE — PLAN OF CARE
Harsha Larson  [address] [place] [address]    Payor: [employer] / Plan: [facility] CHOICE PLUS / Product Type: Commercial /        06/26/19 1223   Discharge Assessment   Assessment Type Discharge Planning Assessment   Confirmed/corrected address and phone number on facesheet? Yes   Assessment information obtained from? Patient;Caregiver   Prior to hospitilization cognitive status:   (mentation waxes and wanes )   Prior to hospitalization functional status: Independent   Current cognitive status:   (mentation waxes and wanes )   Current Functional Status: Needs Assistance   Facility Arrived From:   (transfer from [place], [place])   Able to Return to Prior Arrangements yes   Is patient able to care for self after discharge? Unable to determine at this time (comments)   Patient currently receives any other outside agency services? No   Equipment Currently Used at Home none   Do you have any problems affording any of your prescribed medications? TBD   Does the patient have transportation home? Yes   Transportation Anticipated family or friend will provide   Discharge Plan A Home with family;Home Health   Discharge Plan B Home with family   DME Needed Upon Discharge    (pending hospital progression )   Patient/Family in Agreement with Plan yes

## 2019-06-27 ENCOUNTER — DOCUMENTATION ONLY (OUTPATIENT)
Dept: PHARMACY | Facility: HOSPITAL | Age: 39
End: 2019-06-27

## 2019-06-27 ENCOUNTER — DOCUMENTATION ONLY (OUTPATIENT)
Dept: TRANSPLANT | Facility: CLINIC | Age: 39
End: 2019-06-27

## 2019-06-27 ENCOUNTER — TELEPHONE (OUTPATIENT)
Dept: TRANSPLANT | Facility: HOSPITAL | Age: 39
End: 2019-06-27

## 2019-06-27 PROBLEM — K92.0 COFFEE GROUND EMESIS: Status: ACTIVE | Noted: 2019-06-27

## 2019-06-27 PROBLEM — K76.7 HEPATORENAL SYNDROME: Status: ACTIVE | Noted: 2019-06-27

## 2019-06-27 PROBLEM — K92.2 UPPER GI BLEED: Status: ACTIVE | Noted: 2019-06-27

## 2019-06-27 PROBLEM — D62 ACUTE BLOOD LOSS ANEMIA: Status: ACTIVE | Noted: 2019-06-27

## 2019-06-27 LAB
25(OH)D3+25(OH)D2 SERPL-MCNC: 13 NG/ML (ref 30–96)
ABO + RH BLD: NORMAL
AFP SERPL-MCNC: 1.6 NG/ML (ref 0–8.4)
ALBUMIN SERPL BCP-MCNC: 1.8 G/DL (ref 3.5–5.2)
ALP SERPL-CCNC: 318 U/L (ref 55–135)
ALT SERPL W/O P-5'-P-CCNC: 15 U/L (ref 10–44)
ANA SER QL IF: NORMAL
ANION GAP SERPL CALC-SCNC: 11 MMOL/L (ref 8–16)
ANION GAP SERPL CALC-SCNC: 12 MMOL/L (ref 8–16)
ANION GAP SERPL CALC-SCNC: 13 MMOL/L (ref 8–16)
ANISOCYTOSIS BLD QL SMEAR: SLIGHT
ASCENDING AORTA: 2.72 CM
AST SERPL-CCNC: 93 U/L (ref 10–40)
AV INDEX (PROSTH): 0.84
AV MEAN GRADIENT: 5 MMHG
AV PEAK GRADIENT: 10 MMHG
AV VALVE AREA: 2.18 CM2
AV VELOCITY RATIO: 0.82
BASOPHILS # BLD AUTO: 0.05 K/UL (ref 0–0.2)
BASOPHILS # BLD AUTO: 0.05 K/UL (ref 0–0.2)
BASOPHILS # BLD AUTO: 0.08 K/UL (ref 0–0.2)
BASOPHILS NFR BLD: 0.2 % (ref 0–1.9)
BASOPHILS NFR BLD: 0.3 % (ref 0–1.9)
BASOPHILS NFR BLD: 0.3 % (ref 0–1.9)
BILIRUB SERPL-MCNC: 25.4 MG/DL (ref 0.1–1)
BLD GP AB SCN CELLS X3 SERPL QL: NORMAL
BLD PROD TYP BPU: NORMAL
BLOOD UNIT EXPIRATION DATE: NORMAL
BLOOD UNIT TYPE CODE: 5100
BLOOD UNIT TYPE: NORMAL
BSA FOR ECHO PROCEDURE: 1.66 M2
BUN SERPL-MCNC: 70 MG/DL (ref 6–20)
BUN SERPL-MCNC: 71 MG/DL (ref 6–20)
BUN SERPL-MCNC: 71 MG/DL (ref 6–20)
BURR CELLS BLD QL SMEAR: ABNORMAL
CALCIUM SERPL-MCNC: 8.8 MG/DL (ref 8.7–10.5)
CALCIUM SERPL-MCNC: 9 MG/DL (ref 8.7–10.5)
CALCIUM SERPL-MCNC: 9.3 MG/DL (ref 8.7–10.5)
CHLORIDE SERPL-SCNC: 87 MMOL/L (ref 95–110)
CHLORIDE SERPL-SCNC: 88 MMOL/L (ref 95–110)
CHLORIDE SERPL-SCNC: 88 MMOL/L (ref 95–110)
CO2 SERPL-SCNC: 17 MMOL/L (ref 23–29)
CO2 SERPL-SCNC: 18 MMOL/L (ref 23–29)
CO2 SERPL-SCNC: 19 MMOL/L (ref 23–29)
CODING SYSTEM: NORMAL
CORTIS SERPL-MCNC: 7.8 UG/DL (ref 4.3–22.4)
CREAT SERPL-MCNC: 1.6 MG/DL (ref 0.5–1.4)
CREAT SERPL-MCNC: 2.1 MG/DL (ref 0.5–1.4)
CREAT SERPL-MCNC: 2.2 MG/DL (ref 0.5–1.4)
CV ECHO LV RWT: 0.44 CM
DIFFERENTIAL METHOD: ABNORMAL
DISPENSE STATUS: NORMAL
DOP CALC AO PEAK VEL: 1.59 M/S
DOP CALC AO VTI: 30.28 CM
DOP CALC LVOT AREA: 2.6 CM2
DOP CALC LVOT DIAMETER: 1.82 CM
DOP CALC LVOT PEAK VEL: 1.3 M/S
DOP CALC LVOT STROKE VOLUME: 65.97 CM3
DOP CALCLVOT PEAK VEL VTI: 25.37 CM
E WAVE DECELERATION TIME: 251.66 MSEC
E/A RATIO: 1.2
E/E' RATIO: 6.38 M/S
ECHO LV POSTERIOR WALL: 0.89 CM (ref 0.6–1.1)
EOSINOPHIL # BLD AUTO: 0.4 K/UL (ref 0–0.5)
EOSINOPHIL # BLD AUTO: 0.6 K/UL (ref 0–0.5)
EOSINOPHIL # BLD AUTO: 0.6 K/UL (ref 0–0.5)
EOSINOPHIL NFR BLD: 1.8 % (ref 0–8)
EOSINOPHIL NFR BLD: 2.3 % (ref 0–8)
EOSINOPHIL NFR BLD: 3 % (ref 0–8)
ERYTHROCYTE [DISTWIDTH] IN BLOOD BY AUTOMATED COUNT: 17 % (ref 11.5–14.5)
ERYTHROCYTE [DISTWIDTH] IN BLOOD BY AUTOMATED COUNT: 17 % (ref 11.5–14.5)
ERYTHROCYTE [DISTWIDTH] IN BLOOD BY AUTOMATED COUNT: 17.2 % (ref 11.5–14.5)
EST. GFR  (AFRICAN AMERICAN): 31.8 ML/MIN/1.73 M^2
EST. GFR  (AFRICAN AMERICAN): 33.7 ML/MIN/1.73 M^2
EST. GFR  (AFRICAN AMERICAN): 46.8 ML/MIN/1.73 M^2
EST. GFR  (NON AFRICAN AMERICAN): 27.6 ML/MIN/1.73 M^2
EST. GFR  (NON AFRICAN AMERICAN): 29.2 ML/MIN/1.73 M^2
EST. GFR  (NON AFRICAN AMERICAN): 40.6 ML/MIN/1.73 M^2
FRACTIONAL SHORTENING: 35 % (ref 28–44)
GLUCOSE SERPL-MCNC: 59 MG/DL (ref 70–110)
GLUCOSE SERPL-MCNC: 69 MG/DL (ref 70–110)
GLUCOSE SERPL-MCNC: 80 MG/DL (ref 70–110)
HBV CORE AB SERPL QL IA: NEGATIVE
HBV SURFACE AB SER-ACNC: NEGATIVE M[IU]/ML
HCT VFR BLD AUTO: 23.8 % (ref 37–48.5)
HCT VFR BLD AUTO: 24.9 % (ref 37–48.5)
HCT VFR BLD AUTO: 25.5 % (ref 37–48.5)
HEPATITIS A ANTIBODY, IGG: POSITIVE
HGB BLD-MCNC: 8.8 G/DL (ref 12–16)
HGB BLD-MCNC: 8.9 G/DL (ref 12–16)
HGB BLD-MCNC: 9.4 G/DL (ref 12–16)
HYPOCHROMIA BLD QL SMEAR: ABNORMAL
IMM GRANULOCYTES # BLD AUTO: 0.2 K/UL (ref 0–0.04)
IMM GRANULOCYTES # BLD AUTO: 0.2 K/UL (ref 0–0.04)
IMM GRANULOCYTES # BLD AUTO: 0.22 K/UL (ref 0–0.04)
IMM GRANULOCYTES NFR BLD AUTO: 0.9 % (ref 0–0.5)
IMM GRANULOCYTES NFR BLD AUTO: 0.9 % (ref 0–0.5)
IMM GRANULOCYTES NFR BLD AUTO: 1 % (ref 0–0.5)
INR PPP: 2.4 (ref 0.8–1.2)
INTERVENTRICULAR SEPTUM: 0.76 CM (ref 0.6–1.1)
LA MAJOR: 4.42 CM
LA MINOR: 4.4 CM
LA WIDTH: 3.67 CM
LACTATE SERPL-SCNC: 1.5 MMOL/L (ref 0.5–2.2)
LEFT ATRIUM SIZE: 3.07 CM
LEFT ATRIUM VOLUME INDEX: 25.4 ML/M2
LEFT ATRIUM VOLUME: 42.23 CM3
LEFT INTERNAL DIMENSION IN SYSTOLE: 2.61 CM (ref 2.1–4)
LEFT VENTRICLE DIASTOLIC VOLUME INDEX: 42.86 ML/M2
LEFT VENTRICLE DIASTOLIC VOLUME: 71.29 ML
LEFT VENTRICLE MASS INDEX: 59 G/M2
LEFT VENTRICLE SYSTOLIC VOLUME INDEX: 15 ML/M2
LEFT VENTRICLE SYSTOLIC VOLUME: 24.88 ML
LEFT VENTRICULAR INTERNAL DIMENSION IN DIASTOLE: 4.03 CM (ref 3.5–6)
LEFT VENTRICULAR MASS: 98.61 G
LV LATERAL E/E' RATIO: 6.38 M/S
LV SEPTAL E/E' RATIO: 6.38 M/S
LYMPHOCYTES # BLD AUTO: 0.9 K/UL (ref 1–4.8)
LYMPHOCYTES # BLD AUTO: 1 K/UL (ref 1–4.8)
LYMPHOCYTES # BLD AUTO: 1.3 K/UL (ref 1–4.8)
LYMPHOCYTES NFR BLD: 4 % (ref 18–48)
LYMPHOCYTES NFR BLD: 4.9 % (ref 18–48)
LYMPHOCYTES NFR BLD: 5.4 % (ref 18–48)
MAGNESIUM SERPL-MCNC: 2.2 MG/DL (ref 1.6–2.6)
MCH RBC QN AUTO: 35.6 PG (ref 27–31)
MCH RBC QN AUTO: 36 PG (ref 27–31)
MCH RBC QN AUTO: 36 PG (ref 27–31)
MCHC RBC AUTO-ENTMCNC: 35.3 G/DL (ref 32–36)
MCHC RBC AUTO-ENTMCNC: 36.9 G/DL (ref 32–36)
MCHC RBC AUTO-ENTMCNC: 37.4 G/DL (ref 32–36)
MCV RBC AUTO: 101 FL (ref 82–98)
MCV RBC AUTO: 96 FL (ref 82–98)
MCV RBC AUTO: 98 FL (ref 82–98)
MITOCHONDRIA AB TITR SER IF: NORMAL {TITER}
MONOCYTES # BLD AUTO: 0.9 K/UL (ref 0.3–1)
MONOCYTES # BLD AUTO: 1.1 K/UL (ref 0.3–1)
MONOCYTES # BLD AUTO: 1.2 K/UL (ref 0.3–1)
MONOCYTES NFR BLD: 4.6 % (ref 4–15)
MONOCYTES NFR BLD: 5 % (ref 4–15)
MONOCYTES NFR BLD: 5.1 % (ref 4–15)
MV PEAK A VEL: 0.85 M/S
MV PEAK E VEL: 1.02 M/S
NEUTROPHILS # BLD AUTO: 16.8 K/UL (ref 1.8–7.7)
NEUTROPHILS # BLD AUTO: 19.2 K/UL (ref 1.8–7.7)
NEUTROPHILS # BLD AUTO: 20.5 K/UL (ref 1.8–7.7)
NEUTROPHILS NFR BLD: 86.1 % (ref 38–73)
NEUTROPHILS NFR BLD: 86.2 % (ref 38–73)
NEUTROPHILS NFR BLD: 88 % (ref 38–73)
NRBC BLD-RTO: 0 /100 WBC
NUM UNITS TRANS FFP: NORMAL
OVALOCYTES BLD QL SMEAR: ABNORMAL
PHOSPHATE SERPL-MCNC: 6.5 MG/DL (ref 2.7–4.5)
PISA TR MAX VEL: 3 M/S
PLATELET # BLD AUTO: 52 K/UL (ref 150–350)
PLATELET # BLD AUTO: 55 K/UL (ref 150–350)
PLATELET # BLD AUTO: 61 K/UL (ref 150–350)
PLATELET BLD QL SMEAR: ABNORMAL
PMV BLD AUTO: 12.6 FL (ref 9.2–12.9)
PMV BLD AUTO: 13 FL (ref 9.2–12.9)
PMV BLD AUTO: ABNORMAL FL (ref 9.2–12.9)
POCT GLUCOSE: 156 MG/DL (ref 70–110)
POCT GLUCOSE: 202 MG/DL (ref 70–110)
POCT GLUCOSE: 261 MG/DL (ref 70–110)
POCT GLUCOSE: 60 MG/DL (ref 70–110)
POCT GLUCOSE: 67 MG/DL (ref 70–110)
POCT GLUCOSE: 89 MG/DL (ref 70–110)
POIKILOCYTOSIS BLD QL SMEAR: SLIGHT
POLYCHROMASIA BLD QL SMEAR: ABNORMAL
POTASSIUM SERPL-SCNC: 3.9 MMOL/L (ref 3.5–5.1)
POTASSIUM SERPL-SCNC: 4.1 MMOL/L (ref 3.5–5.1)
POTASSIUM SERPL-SCNC: 4.2 MMOL/L (ref 3.5–5.1)
PREALB SERPL-MCNC: 3 MG/DL (ref 20–43)
PROT SERPL-MCNC: 5.1 G/DL (ref 6–8.4)
PROTHROMBIN TIME: 23.1 SEC (ref 9–12.5)
RA MAJOR: 4.1 CM
RA WIDTH: 3.08 CM
RBC # BLD AUTO: 2.47 M/UL (ref 4–5.4)
RBC # BLD AUTO: 2.47 M/UL (ref 4–5.4)
RBC # BLD AUTO: 2.61 M/UL (ref 4–5.4)
RIGHT VENTRICULAR END-DIASTOLIC DIMENSION: 2.93 CM
RPR SER QL: NORMAL
SINUS: 2.42 CM
SODIUM SERPL-SCNC: 117 MMOL/L (ref 136–145)
SODIUM SERPL-SCNC: 118 MMOL/L (ref 136–145)
SODIUM SERPL-SCNC: 118 MMOL/L (ref 136–145)
STJ: 2.49 CM
TDI LATERAL: 0.16 M/S
TDI SEPTAL: 0.16 M/S
TDI: 0.2 M/S
TOXIC GRANULES BLD QL SMEAR: PRESENT
TR MAX PG: 36 MMHG
TRICUSPID ANNULAR PLANE SYSTOLIC EXCURSION: 2.66 CM
VARICELLA INTERPRETATION: POSITIVE
VARICELLA ZOSTER IGG: 3.63 ISR (ref 0–0.9)
WBC # BLD AUTO: 19.51 K/UL (ref 3.9–12.7)
WBC # BLD AUTO: 21.88 K/UL (ref 3.9–12.7)
WBC # BLD AUTO: 23.88 K/UL (ref 3.9–12.7)

## 2019-06-27 PROCEDURE — 86901 BLOOD TYPING SEROLOGIC RH(D): CPT

## 2019-06-27 PROCEDURE — P9017 PLASMA 1 DONOR FRZ W/IN 8 HR: HCPCS

## 2019-06-27 PROCEDURE — 99221 1ST HOSP IP/OBS SF/LOW 40: CPT | Mod: ,,, | Performed by: INTERNAL MEDICINE

## 2019-06-27 PROCEDURE — 99291 CRITICAL CARE FIRST HOUR: CPT | Mod: ,,, | Performed by: HOSPITALIST

## 2019-06-27 PROCEDURE — 99291 PR CRITICAL CARE, E/M 30-74 MINUTES: ICD-10-PCS | Mod: ,,, | Performed by: HOSPITALIST

## 2019-06-27 PROCEDURE — C9113 INJ PANTOPRAZOLE SODIUM, VIA: HCPCS | Performed by: HOSPITALIST

## 2019-06-27 PROCEDURE — 20600001 HC STEP DOWN PRIVATE ROOM

## 2019-06-27 PROCEDURE — 25000003 PHARM REV CODE 250: Performed by: PHYSICIAN ASSISTANT

## 2019-06-27 PROCEDURE — 82105 ALPHA-FETOPROTEIN SERUM: CPT

## 2019-06-27 PROCEDURE — 99221 PR INITIAL HOSPITAL CARE,LEVL I: ICD-10-PCS | Mod: ,,, | Performed by: INTERNAL MEDICINE

## 2019-06-27 PROCEDURE — 83735 ASSAY OF MAGNESIUM: CPT

## 2019-06-27 PROCEDURE — 85610 PROTHROMBIN TIME: CPT

## 2019-06-27 PROCEDURE — 84100 ASSAY OF PHOSPHORUS: CPT

## 2019-06-27 PROCEDURE — 90792 PR PSYCHIATRIC DIAGNOSTIC EVALUATION W/MEDICAL SERVICES: ICD-10-PCS | Mod: ,,, | Performed by: PSYCHIATRY & NEUROLOGY

## 2019-06-27 PROCEDURE — 36430 TRANSFUSION BLD/BLD COMPNT: CPT

## 2019-06-27 PROCEDURE — 25000003 PHARM REV CODE 250: Performed by: HOSPITALIST

## 2019-06-27 PROCEDURE — 80053 COMPREHEN METABOLIC PANEL: CPT

## 2019-06-27 PROCEDURE — 80048 BASIC METABOLIC PNL TOTAL CA: CPT

## 2019-06-27 PROCEDURE — 85025 COMPLETE CBC W/AUTO DIFF WBC: CPT

## 2019-06-27 PROCEDURE — 90792 PSYCH DIAG EVAL W/MED SRVCS: CPT | Mod: ,,, | Performed by: PSYCHIATRY & NEUROLOGY

## 2019-06-27 PROCEDURE — 82533 TOTAL CORTISOL: CPT

## 2019-06-27 PROCEDURE — 36415 COLL VENOUS BLD VENIPUNCTURE: CPT

## 2019-06-27 PROCEDURE — 83605 ASSAY OF LACTIC ACID: CPT

## 2019-06-27 PROCEDURE — 63600175 PHARM REV CODE 636 W HCPCS: Performed by: HOSPITALIST

## 2019-06-27 PROCEDURE — P9047 ALBUMIN (HUMAN), 25%, 50ML: HCPCS | Mod: JG | Performed by: HOSPITALIST

## 2019-06-27 RX ORDER — ALBUMIN HUMAN 250 G/1000ML
25 SOLUTION INTRAVENOUS 3 TIMES DAILY
Status: DISCONTINUED | OUTPATIENT
Start: 2019-06-27 | End: 2019-06-28

## 2019-06-27 RX ORDER — MIDODRINE HYDROCHLORIDE 5 MG/1
5 TABLET ORAL ONCE
Status: COMPLETED | OUTPATIENT
Start: 2019-06-27 | End: 2019-06-27

## 2019-06-27 RX ORDER — HYDROCODONE BITARTRATE AND ACETAMINOPHEN 500; 5 MG/1; MG/1
TABLET ORAL
Status: DISCONTINUED | OUTPATIENT
Start: 2019-06-27 | End: 2019-06-28

## 2019-06-27 RX ORDER — SODIUM CHLORIDE 9 MG/ML
INJECTION, SOLUTION INTRAVENOUS CONTINUOUS
Status: DISCONTINUED | OUTPATIENT
Start: 2019-06-27 | End: 2019-06-27

## 2019-06-27 RX ORDER — CEFTRIAXONE 1 G/1
1 INJECTION, POWDER, FOR SOLUTION INTRAMUSCULAR; INTRAVENOUS
Status: DISCONTINUED | OUTPATIENT
Start: 2019-06-27 | End: 2019-06-27

## 2019-06-27 RX ORDER — PANTOPRAZOLE SODIUM 40 MG/10ML
40 INJECTION, POWDER, LYOPHILIZED, FOR SOLUTION INTRAVENOUS EVERY 12 HOURS
Status: DISCONTINUED | OUTPATIENT
Start: 2019-06-27 | End: 2019-06-28

## 2019-06-27 RX ORDER — OCTREOTIDE ACETATE 100 UG/ML
100 INJECTION, SOLUTION INTRAVENOUS; SUBCUTANEOUS EVERY 8 HOURS
Status: DISCONTINUED | OUTPATIENT
Start: 2019-06-27 | End: 2019-06-27

## 2019-06-27 RX ORDER — MIDODRINE HYDROCHLORIDE 5 MG/1
15 TABLET ORAL 3 TIMES DAILY
Status: DISCONTINUED | OUTPATIENT
Start: 2019-06-27 | End: 2019-07-05 | Stop reason: HOSPADM

## 2019-06-27 RX ORDER — DEXTROSE MONOHYDRATE 50 MG/ML
INJECTION, SOLUTION INTRAVENOUS CONTINUOUS
Status: DISCONTINUED | OUTPATIENT
Start: 2019-06-27 | End: 2019-06-28

## 2019-06-27 RX ORDER — CEFTRIAXONE 1 G/1
1 INJECTION, POWDER, FOR SOLUTION INTRAMUSCULAR; INTRAVENOUS ONCE
Status: COMPLETED | OUTPATIENT
Start: 2019-06-27 | End: 2019-06-27

## 2019-06-27 RX ORDER — LACTULOSE 10 G/15ML
45 SOLUTION ORAL 3 TIMES DAILY
Status: DISCONTINUED | OUTPATIENT
Start: 2019-06-27 | End: 2019-06-30

## 2019-06-27 RX ORDER — OCTREOTIDE ACETATE 100 UG/ML
100 INJECTION, SOLUTION INTRAVENOUS; SUBCUTANEOUS ONCE
Status: COMPLETED | OUTPATIENT
Start: 2019-06-27 | End: 2019-06-27

## 2019-06-27 RX ADMIN — PROMETHAZINE HYDROCHLORIDE 12.5 MG: 12.5 TABLET ORAL at 08:06

## 2019-06-27 RX ADMIN — RIFAXIMIN 550 MG: 550 TABLET ORAL at 09:06

## 2019-06-27 RX ADMIN — DEXTROSE 250 ML: 10 SOLUTION INTRAVENOUS at 06:06

## 2019-06-27 RX ADMIN — FOLIC ACID 1 MG: 1 TABLET ORAL at 09:06

## 2019-06-27 RX ADMIN — PANTOPRAZOLE SODIUM 40 MG: 40 TABLET, DELAYED RELEASE ORAL at 09:06

## 2019-06-27 RX ADMIN — PHYTONADIONE 10 MG: 10 INJECTION, EMULSION INTRAMUSCULAR; INTRAVENOUS; SUBCUTANEOUS at 09:06

## 2019-06-27 RX ADMIN — CEFTRIAXONE SODIUM 1 G: 1 INJECTION, POWDER, FOR SOLUTION INTRAMUSCULAR; INTRAVENOUS at 06:06

## 2019-06-27 RX ADMIN — PANTOPRAZOLE SODIUM 40 MG: 40 INJECTION, POWDER, FOR SOLUTION INTRAVENOUS at 08:06

## 2019-06-27 RX ADMIN — MIDODRINE HYDROCHLORIDE 15 MG: 5 TABLET ORAL at 09:06

## 2019-06-27 RX ADMIN — LIDOCAINE 2 PATCH: 50 PATCH TOPICAL at 10:06

## 2019-06-27 RX ADMIN — DEXTROSE: 5 SOLUTION INTRAVENOUS at 10:06

## 2019-06-27 RX ADMIN — CEFTRIAXONE SODIUM 1 G: 1 INJECTION, POWDER, FOR SOLUTION INTRAMUSCULAR; INTRAVENOUS at 03:06

## 2019-06-27 RX ADMIN — SODIUM CHLORIDE: 0.9 INJECTION, SOLUTION INTRAVENOUS at 06:06

## 2019-06-27 RX ADMIN — ALBUMIN (HUMAN) 25 G: 25 SOLUTION INTRAVENOUS at 10:06

## 2019-06-27 RX ADMIN — CIPROFLOXACIN HYDROCHLORIDE 500 MG: 500 TABLET, FILM COATED ORAL at 09:06

## 2019-06-27 RX ADMIN — OCTREOTIDE ACETATE 50 MCG/HR: 1000 INJECTION, SOLUTION INTRAVENOUS; SUBCUTANEOUS at 05:06

## 2019-06-27 RX ADMIN — MIDODRINE HYDROCHLORIDE 5 MG: 5 TABLET ORAL at 06:06

## 2019-06-27 RX ADMIN — DEXTROSE 125 ML: 10 SOLUTION INTRAVENOUS at 10:06

## 2019-06-27 RX ADMIN — ALBUMIN (HUMAN) 25 G: 25 SOLUTION INTRAVENOUS at 03:06

## 2019-06-27 RX ADMIN — RIFAXIMIN 550 MG: 550 TABLET ORAL at 10:06

## 2019-06-27 RX ADMIN — LACTULOSE 45 G: 20 SOLUTION ORAL at 09:06

## 2019-06-27 RX ADMIN — MIDODRINE HYDROCHLORIDE 15 MG: 5 TABLET ORAL at 03:06

## 2019-06-27 RX ADMIN — MIDODRINE HYDROCHLORIDE 5 MG: 5 TABLET ORAL at 05:06

## 2019-06-27 RX ADMIN — LACTULOSE 45 G: 20 SOLUTION ORAL at 03:06

## 2019-06-27 RX ADMIN — TRAMADOL HYDROCHLORIDE 50 MG: 50 TABLET, FILM COATED ORAL at 06:06

## 2019-06-27 RX ADMIN — LACTULOSE 45 G: 20 SOLUTION ORAL at 10:06

## 2019-06-27 RX ADMIN — OCTREOTIDE ACETATE 100 MCG: 100 INJECTION, SOLUTION INTRAVENOUS; SUBCUTANEOUS at 02:06

## 2019-06-27 RX ADMIN — OCTREOTIDE ACETATE 100 MCG: 100 INJECTION, SOLUTION INTRAVENOUS; SUBCUTANEOUS at 06:06

## 2019-06-27 RX ADMIN — Medication 100 MG: at 09:06

## 2019-06-27 RX ADMIN — ALBUMIN (HUMAN) 25 G: 25 SOLUTION INTRAVENOUS at 09:06

## 2019-06-27 RX ADMIN — MIDODRINE HYDROCHLORIDE 15 MG: 5 TABLET ORAL at 10:06

## 2019-06-27 NOTE — SUBJECTIVE & OBJECTIVE
Past Medical History:   Diagnosis Date    Acute alcoholic hepatitis 2019    Acute liver failure without hepatic coma 2019    Alcohol use disorder, severe, dependence 2019    Coagulopathy 2019    Hepatic encephalopathy 2019    Hyponatremia 2019    Thrombocytopenia due to hypersplenism 2019       Past Surgical History:   Procedure Laterality Date     SECTION      CHOLECYSTECTOMY         Review of patient's allergies indicates:   Allergen Reactions    Zofran [ondansetron hcl (pf)]      headache       Family History     None        Tobacco Use    Smoking status: Current Every Day Smoker     Packs/day: 1.00     Years: 15.00     Pack years: 15.00     Types: Cigarettes   Substance and Sexual Activity    Alcohol use: Not Currently     Comment: 19 last drink    Drug use: Never    Sexual activity: Yes     Partners: Female     Birth control/protection: None      Review of Systems   Constitutional: Positive for activity change, appetite change, fatigue and unexpected weight change.   Respiratory: Negative for chest tightness and shortness of breath.    Cardiovascular: Positive for leg swelling. Negative for chest pain.   Gastrointestinal: Positive for abdominal distention, abdominal pain, blood in stool (small coffee ground appearance in most recent BM, no BRBPR or melena), nausea and vomiting. Negative for constipation and diarrhea.   Genitourinary: Positive for decreased urine volume and hematuria (Possible - unclear if from BM or urine per pt's ).   Musculoskeletal: Positive for back pain. Negative for neck stiffness.   Skin: Positive for color change (Jaundice) and rash (Superior chest, face telangiectasias).   Psychiatric/Behavioral: Positive for confusion and sleep disturbance.     Objective:     Vital Signs (Most Recent):  Temp: 97 °F (36.1 °C) (19)  Pulse: 84 (19)  Resp: 18 (19)  BP: (!) 97/52 (19)  SpO2: 98 %  (06/27/19 1819) Vital Signs (24h Range):  Temp:  [96 °F (35.6 °C)-97.9 °F (36.6 °C)] 97 °F (36.1 °C)  Pulse:  [50-89] 84  Resp:  [16-18] 18  SpO2:  [92 %-99 %] 98 %  BP: (80-97)/(45-56) 97/52   Weight: 60.3 kg (133 lb)  Body mass index is 22.13 kg/m².      Intake/Output Summary (Last 24 hours) at 6/27/2019 1827  Last data filed at 6/27/2019 1714  Gross per 24 hour   Intake 275 ml   Output 300 ml   Net -25 ml       Physical Exam   Constitutional: No distress.   Jaundiced, lying in bed, mildly uncomfortable.   HENT:   Head: Normocephalic and atraumatic.   Diffuse facial telangiectasias    Oropharyngeal thrush    No palatal petechiae    Dried blood at corners of mouth    Eyes: Pupils are equal, round, and reactive to light. Right eye exhibits no discharge. Left eye exhibits no discharge. Scleral icterus is present.   Neck: No JVD present.   Cardiovascular: Normal rate, regular rhythm and normal heart sounds.   Pulmonary/Chest: Effort normal and breath sounds normal. She has no rales.   Abdominal: Bowel sounds are normal. She exhibits distension. She exhibits no mass. There is tenderness.   Mildly firm abdomen.   Musculoskeletal: She exhibits no edema.   Neurological: She is alert.   Arouses to voice. Able to give details about medical course / initial diagnosis    +asterixis   Skin: Skin is warm and dry.   Jaundiced       Vents:     Lines/Drains/Airways     Peripheral Intravenous Line                 Midline Catheter Insertion/Assessment  - Single Lumen 06/26/19 1125 Right brachial vein 18g x 10cm 1 day              Significant Labs:    CBC/Anemia Profile:  Recent Labs   Lab 06/26/19  0802 06/27/19  0403 06/27/19  1326   WBC 23.39* 23.88* 21.88*   HGB 10.4* 9.4* 8.9*   HCT 30.3* 25.5* 23.8*   PLT 54* 55* 52*   * 98 96   RDW 17.4* 17.0* 17.0*        Chemistries:  Recent Labs   Lab 06/26/19  0802 06/26/19  1901 06/27/19  0403 06/27/19  1154   * 118* 117* 118*   K 4.0 3.9 4.2 3.9   CL 88* 86* 87* 88*   CO2  18* 22* 18* 19*   BUN 64* 64* 71* 70*   CREATININE 1.3 1.6* 1.6* 2.1*   CALCIUM 9.7 9.6 9.3 8.8   ALBUMIN 2.0*  --  1.8*  --    PROT 5.8*  --  5.1*  --    BILITOT 28.4*  --  25.4*  --    ALKPHOS 345*  --  318*  --    ALT 15  --  15  --    AST 96*  --  93*  --    MG 2.1  --  2.2  --    PHOS 6.2*  --  6.5*  --      MELD-Na score: 38 at 6/27/2019 11:54 AM  MELD score: 36 at 6/27/2019 11:54 AM  Calculated from:  Serum Creatinine: 2.1 mg/dL at 6/27/2019 11:54 AM  Serum Sodium: 118 mmol/L (Rounded to 125 mmol/L) at 6/27/2019 11:54 AM  Total Bilirubin: 25.4 mg/dL at 6/27/2019  4:03 AM  INR(ratio): 2.4 at 6/27/2019  4:03 AM  Age: 38 years    Significant Imaging: I have reviewed all pertinent imaging results/findings within the past 24 hours.

## 2019-06-27 NOTE — CONSULTS
"[facility]  Hepatology  Consult Note    Patient Name: Destinee Gallagher  MRN: [id number]  Admission Date: 6/26/2019  Hospital Length of Stay: 0 days  Attending Provider: Marisa Banks MD   Primary Care Physician: Harsha Larson  Principal Problem:Acute liver failure without hepatic coma    Inpatient consult to Hepatology  Consult performed by: Selena Keene MD  Consult ordered by: Jarvis Gallego DO        Subjective:     HPI:  38 year old female with a history of alcoholic cirrhosis on who hepatology is being consulted for decompensated cirrhosis.    Per HPI:  "Presented to [facility] [place]26/2019 as a transfer from  [facility] in [place], [place], for liver transplantation evaluation as well as hepatology evaluation. Patient was admitted to OSH 6/6 with abdominal pain, nausea, decreased oral intake, and visibly jaundice.  Over past 2.5 weeks there , pt has been diagnosed with and treated for SBP.  She has been continued on diuretics, but ascites has been resistant to diuretics , requiring multiple paracenteses (last LVP several days prior to transfer).  Most recent paracentesis did not meet criteria for SBP, and had no PMNs, but pt remained on Rocephin for leukocytosis of unclear source.  She has been encephalopathic but slowly improving with increased doses of Lactulose and Rifaxamin.  Pt has not improved with current treatment, thus requesting liver transplant evaluation, Case d/w Dr. Posadas, patient transferred to [facility]. MELD 35 upon presentation at [facility]. Last ETOH drink on 6/3     Upon presentation at [facility], patient was afebrile with systolic blood pressures in the 90s satting 95% on room air.  She was mildly encephalopathic, however was able to participate in conversation,  at bedside.  Meld 35 on presentation.  Labs notable for total bilirubin of 28, sodium of 118 (patient has been getting in diuretics at the outside hospital), INR of 2.4.  WBC of 23, platelets 54.      Upon " "further questioning regarding patient's disease process, she stated that she has had history of heavy alcohol use, most recently 1-2 pt of vodka per day.  Patient's last alcoholic drink was on 06/03/2019, approximately.  Patient did not state that she has been binge drinking, as noted above 2 pt of vodka per day, prior to her admission at outside hospital.  Patient also uses tobacco.  She stated that she was 1st diagnosed with alcoholic cirrhosis and alcoholic hepatitis in August 2018 and was advised to stop drinking at that time.  Patient has had multiple relapses since then.  It appears the patient has failed multiple alcohol rehabs in the past.  In the last couple of months patient has had recurrent problem with worsening ascites and abdominal distention."    Interval History:  Patient seen with  at bedside. Reports he last drink was on 6/1.  She has been to rehab multiple times but has always relapse.  Feels like this time will be different as she will follow instructions and has 2 kids to live for.    Review of Systems   Constitutional: Positive for fatigue. Negative for activity change, appetite change, chills, diaphoresis, fever and unexpected weight change.   HENT: Negative for trouble swallowing and voice change.    Eyes: Negative for photophobia, pain, discharge, redness, itching and visual disturbance.   Respiratory: Negative for cough and shortness of breath.    Cardiovascular: Negative for chest pain, palpitations and leg swelling.   Gastrointestinal: Positive for abdominal distention. Negative for abdominal pain, anal bleeding, blood in stool, constipation, diarrhea, nausea, rectal pain and vomiting.   Endocrine: Negative for cold intolerance, heat intolerance, polydipsia, polyphagia and polyuria.   Genitourinary: Negative for dysuria, frequency, hematuria and urgency.   Musculoskeletal: Negative for back pain and neck pain.   Skin: Negative for color change, pallor, rash and wound. "   Neurological: Negative for dizziness, syncope, weakness and light-headedness.       Past Medical History:   Diagnosis Date    Acute alcoholic hepatitis 2019    Acute liver failure without hepatic coma 2019    Alcohol use disorder, severe, dependence 2019    Coagulopathy 2019    Hepatic encephalopathy 2019    Hyponatremia 2019    Thrombocytopenia due to hypersplenism 2019       Past Surgical History:   Procedure Laterality Date     SECTION      CHOLECYSTECTOMY         Family history of liver disease: No    Review of patient's allergies indicates:   Allergen Reactions    Zofran [ondansetron hcl (pf)]      headache       Tobacco Use    Smoking status: Current Every Day Smoker     Packs/day: 1.00     Years: 15.00     Pack years: 15.00     Types: Cigarettes   Substance and Sexual Activity    Alcohol use: Not Currently     Comment: 19 last drink    Drug use: Never    Sexual activity: Yes     Partners: Female     Birth control/protection: None       Medications Prior to Admission   Medication Sig Dispense Refill Last Dose    magnesium citrate solution Take 296 mLs by mouth once.       multivitamin (THERAGRAN) per tablet Take 1 tablet by mouth once daily.       omeprazole (PRILOSEC) 20 MG capsule Take 20 mg by mouth once daily.   Past Week at Unknown time    spironolactone (ALDACTONE) 100 MG tablet Take 100 mg by mouth once daily.          Objective:     Vital Signs (Most Recent):  Temp: 97 °F (36.1 °C) (19 1516)  Pulse: 80 (19 1605)  Resp: 18 (19 1516)  BP: (!) 105/55 (19 1516)  SpO2: 98 % (19 1516) Vital Signs (24h Range):  Temp:  [96.6 °F (35.9 °C)-97 °F (36.1 °C)] 97 °F (36.1 °C)  Pulse:  [80-86] 80  Resp:  [18-20] 18  SpO2:  [95 %-98 %] 98 %  BP: ()/(53-59) 105/55     Weight: 61.6 kg (135 lb 12.9 oz) (19 0638)  Body mass index is 23.31 kg/m².    Physical Exam   Constitutional: She is oriented to person, place,  and time. No distress.   HENT:   Head: Normocephalic and atraumatic.   Eyes: Scleral icterus is present.   Cardiovascular: Normal rate and regular rhythm.   Pulmonary/Chest: Effort normal and breath sounds normal.   Abdominal: Soft. Bowel sounds are normal. She exhibits distension. She exhibits no mass. There is no tenderness. There is no rebound and no guarding. No hernia.   Musculoskeletal: She exhibits no edema.   Neurological: She is alert and oriented to person, place, and time.   Skin: She is not diaphoretic.       MELD-Na score: 35 at 6/26/2019  8:02 AM  MELD score: 31 at 6/26/2019  8:02 AM  Calculated from:  Serum Creatinine: 1.3 mg/dL at 6/26/2019  8:02 AM  Serum Sodium: 118 mmol/L (Rounded to 125 mmol/L) at 6/26/2019  8:02 AM  Total Bilirubin: 28.4 mg/dL at 6/26/2019  8:02 AM  INR(ratio): 2.4 at 6/26/2019  8:02 AM  Age: 38 years    Significant Labs:  CBC:   Recent Labs   Lab 06/26/19  0802   WBC 23.39*   RBC 2.98*   HGB 10.4*   HCT 30.3*   PLT 54*     CMP:   Recent Labs   Lab 06/26/19  0802   GLU 72   CALCIUM 9.7   ALBUMIN 2.0*   PROT 5.8*   *   K 4.0   CO2 18*   CL 88*   BUN 64*   CREATININE 1.3   ALKPHOS 345*   ALT 15   AST 96*   BILITOT 28.4*     Coagulation:   Recent Labs   Lab 06/26/19  0802   INR 2.4*       Significant Imaging:  X-Ray: Reviewed  US: Reviewed    Assessment/Plan:     Alcoholic cirrhosis of liver with ascites  38 year old female with a history of alcoholic cirrhosis on who hepatology is being consulted for decompensated cirrhosis. Patient with a MELD of 35 therefore apart from recommendations below will ask for approval for inpatient liver transplant evaluation.    Recommendations:  --Daily CMP, CBC, and INR  --Obtain infectious workup   --Continue cipro  --Continue lactulose, rifaximin  --Continue folic acid, thiamine  --Continue midodrine  --Addiction psych              Thank you for your consult. I will follow-up with patient. Please contact us if you have any additional  questions.    Selena Keene M.D.  Gastroenterology Fellow, PGY-V  Pager: [phone]  [facility]

## 2019-06-27 NOTE — NURSING
PHARM.D. PRE-TRANSPLANT NOTE:    This patient's medication therapy was evaluated as part of her pre-transplant evaluation.      The following general pharmacologic concerns were noted: Currently hospitalized on antibiotics    The following pharmacologic concerns related to HCV therapy were noted: N/a      This patient's medication profile was reviewed for contraindications for DAA Hepatitis C therapy:    [x]  No current inducers of CYP 3A4 or PGP  [x]  No amiodarone on this patient's EMR profile in the last 24 months  [x]  No past or current atrial fibrillation on this patient's EMR profile       No current facility-administered medications for this visit.      No current outpatient medications on file.     Facility-Administered Medications Ordered in Other Visits   Medication Dose Route Frequency Provider Last Rate Last Dose    acetaminophen tablet 325 mg  325 mg Oral Q8H PRN Marisa Banks MD        albumin human 25% bottle 25 g  25 g Intravenous TID Marisa Banks MD   25 g at 06/27/19 0944    cefTRIAXone injection 1 g  1 g Intravenous Q24H Marisa Banks MD        dextrose 10% (D10W) Bolus  12.5 g Intravenous PRN Marisa Banks MD        dextrose 10% (D10W) Bolus  25 g Intravenous PRN Marisa Banks MD 1,000 mL/hr at 06/27/19 0653 250 mL at 06/27/19 0653    folic acid tablet 1 mg  1 mg Oral Daily Marisa Banks MD   1 mg at 06/27/19 0949    glucagon (human recombinant) injection 1 mg  1 mg Intramuscular PRN Marisa Banks MD        glucose chewable tablet 16 g  16 g Oral PRN Marisa Banks MD        glucose chewable tablet 24 g  24 g Oral PRN Marisa Banks MD        lactulose 20 gram/30 mL solution Soln 45 g  45 g Oral TID Marisa Banks MD   45 g at 06/27/19 0950    lidocaine 5 % patch 2 patch  2 patch Transdermal Q24H Braeden Field PA-C        midodrine tablet 15 mg  15 mg Oral TID Marisa Banks MD   15 mg at 06/27/19 0944    octreotide injection 100  mcg  100 mcg Subcutaneous Q8H Marisa Banks MD        pantoprazole EC tablet 40 mg  40 mg Oral Daily Marisa Banks MD   40 mg at 06/27/19 0949    phytonadione vitamin k (AQUA-MEPHYTON) 10 mg in dextrose 5 % 50 mL IVPB  10 mg Intravenous Daily Marisa Banks MD 50 mL/hr at 06/27/19 0949 10 mg at 06/27/19 0949    polyethylene glycol packet 17 g  17 g Oral BID PRN Marisa Banks MD        promethazine tablet 12.5 mg  12.5 mg Oral Q6H PRN Marisa Banks MD   12.5 mg at 06/27/19 0812    ramelteon tablet 8 mg  8 mg Oral Nightly PRN Marisa Banks MD   8 mg at 06/26/19 2147    rifAXIMin tablet 550 mg  550 mg Oral BID Marisa Banks MD   550 mg at 06/27/19 0944    sodium chloride 0.9% flush 5 mL  5 mL Intravenous PRN Marisa Banks MD        thiamine tablet 100 mg  100 mg Oral Daily Marisa Banks MD   100 mg at 06/27/19 0944    traMADol tablet 50 mg  50 mg Oral Q6H PRN Marisa Banks MD   50 mg at 06/26/19 2148         Currently Ms. Gallagher is responsible for preparing / administering this patient's medications on a daily basis.  I am available for consultation and can be contacted, as needed by the other members of the Liver Transplant team.

## 2019-06-27 NOTE — NURSING
PRE EDUCATION TEACHING NOTE    Destinee Gallagher was seen in the hospital  today.  Handbook on pre-liver transplant information (see outline below) was given to the patien 's  and time was allowed for questions.  Patient's  was at  her bedside.  Informed consent signed by patient's  due to patient's altered mental status.  Written information given on selection criteria.      LIVER TRANSPLANT WORK-UP EDUCATION  I. NATIONAL REGISTRY LISTING  A. Information for listing  B. Regions  C. Per UNOS, can be listed at more than one center  II. SURGERY  A. Length  B. Complications: bleeding, infection  C. Central lines, drains, Moore catheter, incision, endotracheal tube, NG tube  D. Transfusions, cell saver  III. SHORT TERM RECOVERY  A. ICU, PICU, Hospital stay  IV. LONG TERM RECOVERY  A. Labs at home  B. Clinic visits  C. Complications: infection, rejection, readmissions  D. Normal immunity and immunosuppression  E. Incidence of re-admit in 1st year  V. REJECTION  A. Incidence  B. Treatment: Solumedrol, Prograf, Thymoglobulin (actions and side effects)  VI. IMMUNOSUPPRESSIVES  A. Prednisone  B. Imuran/Cellcept  C. Cyclosporin/Prograf  D. Rapamune  E. Need for lifetime compliance  F. Actions and side effects  G. Costs  VII. RECURRENCE OF VIRAL HEPATITIS

## 2019-06-27 NOTE — SUBJECTIVE & OBJECTIVE
Interval History:   Patient seen X 2 today.    1st visit was in the morning and patient seemed more encephalopathic,  was actually the one providing most of the history.    2nd visit was in the afternoon after the primary team notified us that patient had coffee ground emesis. Upon entering room patient was angry and refusing lactulose. Story obtained from nurse who reported coffee ground emesis X 2 (200 cc X 2).    Current Facility-Administered Medications   Medication    0.9%  NaCl infusion (for blood administration)    0.9%  NaCl infusion (for blood administration)    acetaminophen tablet 325 mg    albumin human 25% bottle 25 g    [START ON 6/28/2019] cefTRIAXone (ROCEPHIN) 2 g in dextrose 5 % 50 mL IVPB    cefTRIAXone injection 1 g    dextrose 10% (D10W) Bolus    dextrose 10% (D10W) Bolus    folic acid tablet 1 mg    glucagon (human recombinant) injection 1 mg    glucose chewable tablet 16 g    glucose chewable tablet 24 g    lactulose 20 gram/30 mL solution Soln 45 g    lidocaine 5 % patch 2 patch    midodrine tablet 15 mg    octreotide (SANDOSTATIN) 500 mcg in sodium chloride 0.9% 100 mL infusion    octreotide injection 100 mcg    octreotide injection 100 mcg    pantoprazole injection 40 mg    phytonadione vitamin k (AQUA-MEPHYTON) 10 mg in dextrose 5 % 50 mL IVPB    polyethylene glycol packet 17 g    promethazine tablet 12.5 mg    ramelteon tablet 8 mg    rifAXIMin tablet 550 mg    sodium chloride 0.9% flush 5 mL    thiamine tablet 100 mg    traMADol tablet 50 mg       Objective:     Vital Signs (Most Recent):  Temp: 96 °F (35.6 °C) (06/27/19 1303)  Pulse: 88 (06/27/19 1529)  Resp: 16 (06/27/19 1415)  BP: (!) 96/54 (06/27/19 1415)  SpO2: (!) 94 % (06/27/19 1415) Vital Signs (24h Range):  Temp:  [96 °F (35.6 °C)-97.9 °F (36.6 °C)] 96 °F (35.6 °C)  Pulse:  [50-89] 88  Resp:  [16-18] 16  SpO2:  [92 %-99 %] 94 %  BP: (80-97)/(45-56) 96/54     Weight: 60.3 kg (133 lb) (06/27/19  1415)  Body mass index is 22.13 kg/m².    Physical Exam   Constitutional: No distress.   HENT:   Head: Normocephalic and atraumatic.   Eyes: Scleral icterus is present.   Cardiovascular: Normal rate and regular rhythm.   Pulmonary/Chest: Effort normal and breath sounds normal.   Abdominal: Soft. Bowel sounds are normal. She exhibits distension. She exhibits no mass. There is no tenderness. There is no rebound and no guarding. No hernia.   Musculoskeletal: She exhibits no edema.   Neurological:   More somnolent and AAOX2 (person and place)   Skin: She is not diaphoretic.       MELD-Na score: 38 at 6/27/2019 11:54 AM  MELD score: 36 at 6/27/2019 11:54 AM  Calculated from:  Serum Creatinine: 2.1 mg/dL at 6/27/2019 11:54 AM  Serum Sodium: 118 mmol/L (Rounded to 125 mmol/L) at 6/27/2019 11:54 AM  Total Bilirubin: 25.4 mg/dL at 6/27/2019  4:03 AM  INR(ratio): 2.4 at 6/27/2019  4:03 AM  Age: 38 years    Significant Labs:  CBC:   Recent Labs   Lab 06/27/19  1326   WBC 21.88*   RBC 2.47*   HGB 8.9*   HCT 23.8*   PLT 52*     CMP:   Recent Labs   Lab 06/27/19  0403 06/27/19  1154   GLU 59* 80   CALCIUM 9.3 8.8   ALBUMIN 1.8*  --    PROT 5.1*  --    * 118*   K 4.2 3.9   CO2 18* 19*   CL 87* 88*   BUN 71* 70*   CREATININE 1.6* 2.1*   ALKPHOS 318*  --    ALT 15  --    AST 93*  --    BILITOT 25.4*  --      Coagulation:   Recent Labs   Lab 06/27/19  0403   INR 2.4*       Significant Imaging:  X-Ray: Reviewed  US: Reviewed

## 2019-06-27 NOTE — ASSESSMENT & PLAN NOTE
38 year old female with a history of alcoholic cirrhosis who hepatology is following for decompensated cirrhosis and liver transplant evaluation. Patient with high MELD and although she has been approved for evaluation we are concerned that she could deterioate quickly. Due to worsening HE we recommend continued antibiotics as well as lactulose and rifaximin. However with new episode of coffee ground emesis this afternoon a possible bleed could also be accounting for worsening HE.    Recommendations:  --Daily CMP, CBC, and INR  --F/U infectious workup  --Start albumin  --Broaden antibiotic coverage  --Continue lactulose, rifaximin  --Continue folic acid, thiamine  --Continue midodrine  --Low threshold for higher level of care and ICU transfer

## 2019-06-27 NOTE — PROGRESS NOTES
"[facility]  Hepatology  Progress Note    Patient Name: Destinee Gallagher  MRN: [id number]  Admission Date: 6/26/2019  Hospital Length of Stay: 1 days  Attending Provider: Marisa Banks MD   Primary Care Physician: Harsha Larson  Principal Problem:Acute liver failure without hepatic coma    Subjective:     HPI: 38 year old female with a history of alcoholic cirrhosis on who hepatology is being consulted for decompensated cirrhosis.    Per HPI:  "Presented to [facility] on 06/26/2019 as a transfer from  [facility] in [place], [place], for liver transplantation evaluation as well as hepatology evaluation. Patient was admitted to OSH 6/6 with abdominal pain, nausea, decreased oral intake, and visibly jaundice.  Over past 2.5 weeks there , pt has been diagnosed with and treated for SBP.  She has been continued on diuretics, but ascites has been resistant to diuretics , requiring multiple paracenteses (last LVP several days prior to transfer).  Most recent paracentesis did not meet criteria for SBP, and had no PMNs, but pt remained on Rocephin for leukocytosis of unclear source.  She has been encephalopathic but slowly improving with increased doses of Lactulose and Rifaxamin.  Pt has not improved with current treatment, thus requesting liver transplant evaluation, Case d/w Dr. Posadas, patient transferred to [facility]. MELD 35 upon presentation at [facility]. Last ETOH drink on 6/3     Upon presentation at [facility], patient was afebrile with systolic blood pressures in the 90s satting 95% on room air.  She was mildly encephalopathic, however was able to participate in conversation,  at bedside.  Meld 35 on presentation.  Labs notable for total bilirubin of 28, sodium of 118 (patient has been getting in diuretics at the outside hospital), INR of 2.4.  WBC of 23, platelets 54.      Upon further questioning regarding patient's disease process, she stated that she has had history of heavy alcohol use, " "most recently 1-2 pt of vodka per day.  Patient's last alcoholic drink was on 06/03/2019, approximately.  Patient did not state that she has been binge drinking, as noted above 2 pt of vodka per day, prior to her admission at outside hospital.  Patient also uses tobacco.  She stated that she was 1st diagnosed with alcoholic cirrhosis and alcoholic hepatitis in August 2018 and was advised to stop drinking at that time.  Patient has had multiple relapses since then.  It appears the patient has failed multiple alcohol rehabs in the past.  In the last couple of months patient has had recurrent problem with worsening ascites and abdominal distention."    Interval History:   Patient seen X 2 today.    1st visit was in the morning and patient seemed more encephalopathic,  was actually the one providing most of the history.    2nd visit was in the afternoon after the primary team notified us that patient had coffee ground emesis. Upon entering room patient was angry and refusing lactulose. Story obtained from nurse who reported coffee ground emesis X 2 (200 cc X 2).    Current Facility-Administered Medications   Medication    0.9%  NaCl infusion (for blood administration)    0.9%  NaCl infusion (for blood administration)    acetaminophen tablet 325 mg    albumin human 25% bottle 25 g    [START ON 6/28/2019] cefTRIAXone (ROCEPHIN) 2 g in dextrose 5 % 50 mL IVPB    cefTRIAXone injection 1 g    dextrose 10% (D10W) Bolus    dextrose 10% (D10W) Bolus    folic acid tablet 1 mg    glucagon (human recombinant) injection 1 mg    glucose chewable tablet 16 g    glucose chewable tablet 24 g    lactulose 20 gram/30 mL solution Soln 45 g    lidocaine 5 % patch 2 patch    midodrine tablet 15 mg    octreotide (SANDOSTATIN) 500 mcg in sodium chloride 0.9% 100 mL infusion    octreotide injection 100 mcg    octreotide injection 100 mcg    pantoprazole injection 40 mg    phytonadione vitamin k (AQUA-MEPHYTON) 10 mg " in dextrose 5 % 50 mL IVPB    polyethylene glycol packet 17 g    promethazine tablet 12.5 mg    ramelteon tablet 8 mg    rifAXIMin tablet 550 mg    sodium chloride 0.9% flush 5 mL    thiamine tablet 100 mg    traMADol tablet 50 mg       Objective:     Vital Signs (Most Recent):  Temp: 96 °F (35.6 °C) (06/27/19 1303)  Pulse: 88 (06/27/19 1529)  Resp: 16 (06/27/19 1415)  BP: (!) 96/54 (06/27/19 1415)  SpO2: (!) 94 % (06/27/19 1415) Vital Signs (24h Range):  Temp:  [96 °F (35.6 °C)-97.9 °F (36.6 °C)] 96 °F (35.6 °C)  Pulse:  [50-89] 88  Resp:  [16-18] 16  SpO2:  [92 %-99 %] 94 %  BP: (80-97)/(45-56) 96/54     Weight: 60.3 kg (133 lb) (06/27/19 1415)  Body mass index is 22.13 kg/m².    Physical Exam   Constitutional: No distress.   HENT:   Head: Normocephalic and atraumatic.   Eyes: Scleral icterus is present.   Cardiovascular: Normal rate and regular rhythm.   Pulmonary/Chest: Effort normal and breath sounds normal.   Abdominal: Soft. Bowel sounds are normal. She exhibits distension. She exhibits no mass. There is no tenderness. There is no rebound and no guarding. No hernia.   Musculoskeletal: She exhibits no edema.   Neurological:   More somnolent and AAOX2 (person and place)   Skin: She is not diaphoretic.       MELD-Na score: 38 at 6/27/2019 11:54 AM  MELD score: 36 at 6/27/2019 11:54 AM  Calculated from:  Serum Creatinine: 2.1 mg/dL at 6/27/2019 11:54 AM  Serum Sodium: 118 mmol/L (Rounded to 125 mmol/L) at 6/27/2019 11:54 AM  Total Bilirubin: 25.4 mg/dL at 6/27/2019  4:03 AM  INR(ratio): 2.4 at 6/27/2019  4:03 AM  Age: 38 years    Significant Labs:  CBC:   Recent Labs   Lab 06/27/19  1326   WBC 21.88*   RBC 2.47*   HGB 8.9*   HCT 23.8*   PLT 52*     CMP:   Recent Labs   Lab 06/27/19  0403 06/27/19  1154   GLU 59* 80   CALCIUM 9.3 8.8   ALBUMIN 1.8*  --    PROT 5.1*  --    * 118*   K 4.2 3.9   CO2 18* 19*   CL 87* 88*   BUN 71* 70*   CREATININE 1.6* 2.1*   ALKPHOS 318*  --    ALT 15  --    AST 93*  --     BILITOT 25.4*  --      Coagulation:   Recent Labs   Lab 06/27/19  0403   INR 2.4*       Significant Imaging:  X-Ray: Reviewed  US: Reviewed    Assessment/Plan:     Alcoholic cirrhosis of liver with ascites  38 year old female with a history of alcoholic cirrhosis who hepatology is following for decompensated cirrhosis and liver transplant evaluation. Patient with high MELD and although she has been approved for evaluation we are concerned that she could deterioate quickly. Due to worsening HE we recommend continued antibiotics as well as lactulose and rifaximin. However with new episode of coffee ground emesis this afternoon a possible bleed could also be accounting for worsening HE.    Recommendations:  --Daily CMP, CBC, and INR  --F/U infectious workup  --Start albumin  --Broaden antibiotic coverage  --Continue lactulose, rifaximin  --Continue folic acid, thiamine  --Continue midodrine  --Low threshold for higher level of care and ICU transfer          Coffee ground emesis  Based on description and stable hemoglobin suspect PHG however in the setting of cirrhosis would also included EV in the differential    Recommendations:  --Monitor for overt signs of bleeding  --Trend H/H and transfuse as needed  --Protonix 40 mg IV BID  --Octreotide gtt  --We will discuss if GI about adding for EGD tomorrow, however if continued coffee ground emesis, melena, decreasing H/H or HD instability recommend placing a formal  GI consult        Thank you for your consult. I will follow-up with patient. Please contact us if you have any additional questions.    Selena Keene M.D.  Gastroenterology Fellow, PGY-V  Pager: [phone]  [facility]

## 2019-06-27 NOTE — PT/OT/SLP PROGRESS
Occupational Therapy      Patient Name:  Destinee Gallagher   MRN:  [id number]    Patient not seen today secondary to (pt. unavailable after multiple attempts). Will follow-up tomorrow    DANIA Hernandez  6/27/2019

## 2019-06-27 NOTE — CONSULTS
[facility]  Critical Care Medicine  Consult Note    Patient Name: Destinee Gallagher  MRN: [id number]  Admission Date: 6/26/2019  Hospital Length of Stay: 1 days  Code Status: Full Code  Attending Physician: Marisa Banks MD   Primary Care Provider: Harsha Larson   Principal Problem: Acute liver failure without hepatic coma    Inpatient consult to Critical Care Medicine  Consult performed by: Travis Huggins MD  Consult ordered by: Marisa Banks MD        Subjective:     HPI:  38F with hx EtOH cirrhosis, hx EtOH abuse disorder/dependence, hx SBP transferred to [facility] from [facility] for liver transplantation eval / hepatology consultation in the setting of decompensated cirrhosis. Admitted 06/26 with mild encephalopathy, sodium of 118, MELD 35. Critical Care Medicine 06/27 PM after pt had 2x episodes of small volume emesis (100-200 cc) with coffee grounds concerning for GIB as well as pt's mental status and hypotension.    On 06/27 PM exam pt was alert and oriented. She, along with her , gave history, stating that she has had worsening back pain for the last month or so and has also had intermittent abdominal pain and quickly-reacumulating ascites with most recent paracentesis 06/26 (3.4L removed, fluid not consistent with SBP).    and nurse report that pt's mental status has been slightly worse (more tired) today compared to yesterday, but that pt has been improving through the day as she has more BMs. Had small coffee ground emesis x2 today (AM Hb 9.4, PM Hb 8.9); MAPs have been in the 70s today with BP in the 80s consistently. No change in oxygenation. While admitted pt has been treated with lactulose, rifaximin, albumin, midodrine; octreotide and IV PPI BID have been added after coffee-ground emesis.     Admitted to [facility] 06/06 with abdo pain, decreased oral intake, jaundice, lethargy, fatigue. Treated for SBP x1 at OSH (as evidenced  by first paracentesis with evidence of clearance on second paracentesis); case complicated by diuretic-resistant ascites requiring multiple large volume paracentesis, hepatic encephalopathy improving with lactulose and rifaximin, and hyponatremia resistant to diuretics (briefly on hypertonic saline to which it was responsive).     Hospital/ICU Course:  No notes on file    Past Medical History:   Diagnosis Date    Acute alcoholic hepatitis 2019    Acute liver failure without hepatic coma 2019    Alcohol use disorder, severe, dependence 2019    Coagulopathy 2019    Hepatic encephalopathy 2019    Hyponatremia 2019    Thrombocytopenia due to hypersplenism 2019       Past Surgical History:   Procedure Laterality Date     SECTION      CHOLECYSTECTOMY         Review of patient's allergies indicates:   Allergen Reactions    Zofran [ondansetron hcl (pf)]      headache       Family History     None        Tobacco Use    Smoking status: Current Every Day Smoker     Packs/day: 1.00     Years: 15.00     Pack years: 15.00     Types: Cigarettes   Substance and Sexual Activity    Alcohol use: Not Currently     Comment: 19 last drink    Drug use: Never    Sexual activity: Yes     Partners: Female     Birth control/protection: None      Review of Systems   Constitutional: Positive for activity change, appetite change, fatigue and unexpected weight change.   Respiratory: Negative for chest tightness and shortness of breath.    Cardiovascular: Positive for leg swelling. Negative for chest pain.   Gastrointestinal: Positive for abdominal distention, abdominal pain, blood in stool (small coffee ground appearance in most recent BM, no BRBPR or melena), nausea and vomiting. Negative for constipation and diarrhea.   Genitourinary: Positive for decreased urine volume and hematuria (Possible - unclear if from BM or urine per pt's ).   Musculoskeletal: Positive for back pain.  Negative for neck stiffness.   Skin: Positive for color change (Jaundice) and rash (Superior chest, face telangiectasias).   Psychiatric/Behavioral: Positive for confusion and sleep disturbance.     Objective:     Vital Signs (Most Recent):  Temp: 97 °F (36.1 °C) (06/27/19 1819)  Pulse: 84 (06/27/19 1819)  Resp: 18 (06/27/19 1819)  BP: (!) 97/52 (06/27/19 1819)  SpO2: 98 % (06/27/19 1819) Vital Signs (24h Range):  Temp:  [96 °F (35.6 °C)-97.9 °F (36.6 °C)] 97 °F (36.1 °C)  Pulse:  [50-89] 84  Resp:  [16-18] 18  SpO2:  [92 %-99 %] 98 %  BP: (80-97)/(45-56) 97/52   Weight: 60.3 kg (133 lb)  Body mass index is 22.13 kg/m².      Intake/Output Summary (Last 24 hours) at 6/27/2019 1827  Last data filed at 6/27/2019 1714  Gross per 24 hour   Intake 275 ml   Output 300 ml   Net -25 ml       Physical Exam   Constitutional: No distress.   Jaundiced, lying in bed, mildly uncomfortable.   HENT:   Head: Normocephalic and atraumatic.   Diffuse facial telangiectasias    Oropharyngeal thrush    No palatal petechiae    Dried blood at corners of mouth    Eyes: Pupils are equal, round, and reactive to light. Right eye exhibits no discharge. Left eye exhibits no discharge. Scleral icterus is present.   Neck: No JVD present.   Cardiovascular: Normal rate, regular rhythm and normal heart sounds.   Pulmonary/Chest: Effort normal and breath sounds normal. She has no rales.   Abdominal: Bowel sounds are normal. She exhibits distension. She exhibits no mass. There is tenderness.   Mildly firm abdomen.   Musculoskeletal: She exhibits no edema.   Neurological: She is alert.   Arouses to voice. Able to give details about medical course / initial diagnosis    +asterixis   Skin: Skin is warm and dry.   Jaundiced       Vents:     Lines/Drains/Airways     Peripheral Intravenous Line                 Midline Catheter Insertion/Assessment  - Single Lumen 06/26/19 1125 Right brachial vein 18g x 10cm 1 day              Significant Labs:    CBC/Anemia  Profile:  Recent Labs   Lab 06/26/19  0802 06/27/19  0403 06/27/19  1326   WBC 23.39* 23.88* 21.88*   HGB 10.4* 9.4* 8.9*   HCT 30.3* 25.5* 23.8*   PLT 54* 55* 52*   * 98 96   RDW 17.4* 17.0* 17.0*        Chemistries:  Recent Labs   Lab 06/26/19  0802 06/26/19  1901 06/27/19  0403 06/27/19  1154   * 118* 117* 118*   K 4.0 3.9 4.2 3.9   CL 88* 86* 87* 88*   CO2 18* 22* 18* 19*   BUN 64* 64* 71* 70*   CREATININE 1.3 1.6* 1.6* 2.1*   CALCIUM 9.7 9.6 9.3 8.8   ALBUMIN 2.0*  --  1.8*  --    PROT 5.8*  --  5.1*  --    BILITOT 28.4*  --  25.4*  --    ALKPHOS 345*  --  318*  --    ALT 15  --  15  --    AST 96*  --  93*  --    MG 2.1  --  2.2  --    PHOS 6.2*  --  6.5*  --      MELD-Na score: 38 at 6/27/2019 11:54 AM  MELD score: 36 at 6/27/2019 11:54 AM  Calculated from:  Serum Creatinine: 2.1 mg/dL at 6/27/2019 11:54 AM  Serum Sodium: 118 mmol/L (Rounded to 125 mmol/L) at 6/27/2019 11:54 AM  Total Bilirubin: 25.4 mg/dL at 6/27/2019  4:03 AM  INR(ratio): 2.4 at 6/27/2019  4:03 AM  Age: 38 years    Significant Imaging: I have reviewed all pertinent imaging results/findings within the past 24 hours.      ABG  No results for input(s): PH, PO2, PCO2, HCO3, BE in the last 168 hours.  Assessment/Plan:     GI  Alcoholic cirrhosis of liver with ascites  Decompensated Cirrhosis  Hyponatremia  Oliguria  Coffee Ground Emesis  Hepatic Encephalopathy    Assessment:  Admitted with decompensated cirrhosis for Hepatology / Transplant Evaluation. Course c/b persistent hyponatremia and refractory ascites despite diuretics (currently being held - suspected GIB, sodium previously responsive to hypertonic saline at OSH), coffee ground emesis (without hemodynamic instability), suspected HRS.    Currently hemodynamically stable, sodium quite low but has been similarly low at OSH. On optimal medical therapy, not currently requiring pressors or RRT.    Recommendations  - Continue current therapies. If pt continues to decline, becomes  unstable, or becomes symptomatic from hyponatremia please do not hesitate to re-consult.       Thank you for your consult. I will sign off. Please contact us if you have any additional questions.     Travis Huggins MD  Critical Care Medicine  [facility]

## 2019-06-27 NOTE — TELEPHONE ENCOUNTER
Caregiver Update:   (JHONY) received a follow up telephone call from the patient's (pt) , Clayton, to advise that the pt's sister, Jordyn Angel ([phone]) has agreed to be the pt's secondary caregiver.  SW contacted Ms. Angel to verify and educate regarding caregiver responsibilities.  Ms. Angel stated she is able to travel and remain local with the pt, if needed, upon being called to do so.  She further stated she does have small children; however, her  has agreed to provide sole care for their children without issues.  Ms. Angel stated she has been assisting in the pt's care prior to pt's hospitalization without issues.  She does not have any medical conditions of her own which may prevent her from providing adequate care to the pt.  All questions were answered appropriately and no other issues or concerns were discussed.  SW remains available.  Pt now has a verified/approved caregiver plan; however, remains unacceptable/high risk due to the other noted issues within the psychosocial dated today, 6/27/19.

## 2019-06-27 NOTE — ASSESSMENT & PLAN NOTE
Decompensated Cirrhosis  Hyponatremia  Oliguria  Coffee Ground Emesis  Hepatic Encephalopathy    Assessment:  Admitted with decompensated cirrhosis for Hepatology / Transplant Evaluation. Course c/b persistent hyponatremia and refractory ascites despite diuretics (currently being held - suspected GIB, sodium previously responsive to hypertonic saline at OSH), coffee ground emesis (without hemodynamic instability), suspected HRS.    Currently hemodynamically stable, sodium quite low but has been similarly low at OSH. On optimal medical therapy, not currently requiring pressors or RRT.    Recommendations  - Continue current therapies. If pt continues to decline, becomes unstable, or becomes symptomatic from hyponatremia please do not hesitate to re-consult.

## 2019-06-27 NOTE — HPI
38F with hx EtOH cirrhosis, hx EtOH abuse disorder/dependence, hx SBP transferred to [facility] from [facility] for liver transplantation eval / hepatology consultation in the setting of decompensated cirrhosis. Admitted on 06/26 with mild encephalopathy, sodium of 118, MELD 35. Critical Care Medicine was consulted on 06/27 after pt had 2x episodes of small volume emesis (100-200 cc) with coffee grounds concerning for GIB as well as change in pt's mental status and hypotension. Patient was deemed stable at that time to remain on the floor. Critical care medicine was contacted again on 06/29 for rising Cr with MELD up to 40 with possible need for IV pressors. After evaluation by nephrology, decision was made that patient currently does not need pressors or ICU admission.

## 2019-06-27 NOTE — PT/OT/SLP PROGRESS
Physical Therapy      Patient Name:  Destinee Gallagher   MRN:  [id number]    Patient not seen today secondary to (pt. unavailable after multiple attempts). Will follow-up tomorrow.    Evert Brownlee, PT   6/27/2019

## 2019-06-27 NOTE — MEDICAL/APP STUDENT
6/27/2019 9:57 AM  Destinee Gallagher  1980  [id number]    Addiction Psychiatry Transplant Evaluation Consult Note    Consult Requested By: Mraisa Banks MD    SUBJECTIVE     History of Present Illness:   Destinee Gallagher is a 38 y.o. female with a past psychiatric history of alcoholism, who presented to [facility] due to decompensated cirrhosis. Psychiatry was consulted to address the patient's symptoms of substance abuse.    Per Primary MD:  Ms. Destinee Gallagher is a 38 y.o. female with hx of ETOH cirrhosis and ETOH hepatitis, hx of alcohol abuse disorder/ severe alcohol dependence, prior history of coagulopathy and thrombocytopenia, as well as history of SBP, presented to [facility] on 06/26/2019 as a transfer from  [facility] in [place], [place], for liver transplantation evaluation as well as hepatology evaluation. Patient was admitted to OSH 6/6 with abdominal pain, nausea, decreased oral intake, and visibly jaundice.  Over past 2.5 weeks there , pt has been diagnosed with and treated for SBP.  She has been continued on diuretics, but ascites has been resistant to diuretics , requiring multiple paracenteses (last LVP several days prior to transfer).  Most recent paracentesis did not meet criteria for SBP, and had no PMNs, but pt remained on Rocephin for leukocytosis of unclear source.  She has been encephalopathic but slowly improving with increased doses of Lactulose and Rifaxamin.  Pt has not improved with current treatment, thus requesting liver transplant evaluation, Case d/w Dr. Posadas, patient transferred to [facility]. MELD 35 upon presentation at [facility]. Last ETOH drink on 6/3     Upon presentation at [facility], patient was afebrile with systolic blood pressures in the 90s satting 95% on room air.  She was mildly encephalopathic, however was able to participate in conversation,  at bedside.  Meld 35 on presentation.  Labs notable for total bilirubin of 28, sodium of 118 (patient has been getting in  diuretics at the outside hospital), INR of 2.4.  WBC of 23, platelets 54.      Upon further questioning regarding patient's disease process, she stated that she has had history of heavy alcohol use, most recently 1-2 pt of vodka per day.  Patient's last alcoholic drink was on 06/03/2019, approximately.  Patient did not state that she has been binge drinking, as noted above 2 pt of vodka per day, prior to her admission at outside hospital.  Patient also uses tobacco.  She stated that she was 1st diagnosed with alcoholic cirrhosis and alcoholic hepatitis in August 2018 and was advised to stop drinking at that time.  Patient has had multiple relapses since then.  It appears the patient has failed multiple alcohol rehabs in the past.  In the last couple of months patient has had recurrent problem with worsening ascites and abdominal distention.      Per Addiction Psych MD:  Pt is a 39 y/o F w/ a past psychiatric hx of alcoholism, anxiety, and depression. Pt uses alcohol as a coping mechanism for childhood trauma and stressors. Her last drink was ~4 weeks ago.  reports pt has suffered from tremors, N/V, and night sweats in past but no seizures. He reports pt had visual hallucinations and paranoia within last few weeks as well. Pt began drinking around age 15. She used to work as an  until she had an injury at home and was bed bound. Afterwards alcohol intake increased and therefore has been unable to go back to work. She has had 5 failed attempts of  rehab programs in the past with the last being August 2018. Pt has 2 kids aged 15 and 13.  states his job is financially secure, however, pt still stresses about finances. They live in [place] where  states there are not a variety of hospitals or rehab facilities to choose from.  states pt is motivated to quit and aware of the consequences. He intends to move locally until pt's health is better and then move to a place  with a better family support system after. History was obtained from  at bedside as patient was too drowsy after late night transfer and medications.    Substance Abuse History:  Substance of Choice: EtOH  Substances Used: Adolescent use of marijuana, no recent  History of IVDU?: NO  Use of Alcohol: heavy  Average Consumption: Yes - 1-2 pints of vodka/day  Last Drink: Yes - June 2nd  Use of Medications for Alcohol/Opioid Use Disorder: No  History of Complicated Withdrawal(s): No h/o seizures, +hallucinations, hx of encephalopathy   History of Detox: Yes   Rehab History: Yes x5  AA/NA involvement: Yes   Tobacco: Yes - 15-year pack hx  Spouse/Partner Consumption: Yes - Occasional beers  Patient Aware of Biomedical Complications: Yes    DSM-5 Substance Use Disorder Criteria:  1. Often take in larger amounts or over a longer period of time than was intended: Yes  2. Persistent desire or unsuccessful efforts to cut down or control use: Yes  3. Great deal of time spent in activities necessary to obtain substance, use, or recover from effects: Yes  4. Craving/strong desire for substance or urge to use: Yes  5. Use resulting in failure to fulfill major role obligations at home, work or school: Yes  6. Social, occupational, recreational activities decreased because of use: Yes  7. Continued use despite having persistent or recurrent social or interpersonal problems cause or exaserbated by the substance: Yes  8. Recurrent use in situations in which it is physically hazardous: Yes  9. Use despite physical or psychological problems that are likely to have been caused or exacerbated by the substance: Yes  10. Tolerance, as defined by either of the following: Yes   A. A need for markedly increased amounts of substance to achieve intoxication or desired effect. -OR-    B. A markedly diminished effect with continued use of the same amount of substance.  11. Withdrawal, as manifested by the following: Yes   A. The  "characteristic withdrawal syndrome for substance. -AND-   B. Substance is taken to relieve or avoid withdrawal symptoms.  Mild (1-3), Moderate (4-5), Severe (?6)    Transplant Evaluation:  1st informed of impending organ failure - Yes - 1 year ago  When was the subject first of transplantation first broached - Yes - 1 year ago  Pt made the following lifestyle adjustments and how - No  View of AA - Yes - Has not found successful group to fit in with, mostly all-male groups  Has addiction affected your organ failure - Yes   Last use of substances - Yes - 4 weeks ago  Psychiatric diagnosis - Yes   Understand need for lifetime medication - Yes   Expectations - Yes   Social support - Yes - Family  Including pre & post-op - Yes     Psychiatric History:  Diagnose(s): Yes - "Manic depressive"  Previous Medication Trials: Yes - Lithium, Depakote   Previous Psychiatric Hospitalizations: Yes   Family Psychiatric History: Yes - Alcoholism - maternal uncle  Outpatient Psychiatrist: No    Suicide/Violence Risk Assessment:  Current/active suicidal ideation/plan/intent: No  Previous suicide attempts: No  Current/active homicidal ideation/plan/intent: No  History of threats/arrests associated with violent conduct - No  Access to firearms/lethal weapons - Yes -  has 2 guns at home, unloaded and locked     Social History:  Marital Status:   Children: 2   Employment Status: Unemployed  Education: high school diploma/GED  Special Ed: unknown  Housing Status: Yes - At home with family  Developmental History: No  History of Abuse: Yes     Legal History:  Past Charges/Incarcerations: Yes - DUI  Pending Charges: No    Psychosocial Factors:  Stressors: financial.   Functioning Relationships: good relationship with spouse or significant other  Maladaptive or problem behaviors: Yes - Alcohol  Peer group, social, ethic, cultural, emotional, and health factors: No  Living situation, family constellation, family circumstances/home: " No  Recovery environment: Yes - Supportive   Community resources used by patient: No  Treatment acceptance/motivation for change: Yes     Collateral:   Yes -     Psychiatric Review of Systems:  sleep: yes - some difficulty due to physical pain/position  appetite: no  weight: yes  energy/anergy: no  interest/pleasure/anhedonia: no  somatic symptoms: yes  guilty/hopelessness: yes  concentration: yes  S.I.B.s/risky behavior: no  SI/SA:  no    anxiety/panic: yes  Agoraphobia:  no  Social phobia:  no  Recurrent nightmares:  yes  hyper startle response:  no  Avoidance: no  Recurrent thoughts:  yes  Recurrent behaviors:  yes    Irritability: yes  Racing thoughts: no  Impulsive behaviors: no  Pressured speech:  no    Paranoia:yes  Delusions: yes  AVH:yes    Medical Review Of Systems:  Pertinent items noted in HPI    Allergies:  Zofran [ondansetron hcl (pf)]  Medical/Surgical History:  Past Medical History:   Diagnosis Date    Acute alcoholic hepatitis 2019    Acute liver failure without hepatic coma 2019    Alcohol use disorder, severe, dependence 2019    Coagulopathy 2019    Hepatic encephalopathy 2019    Hyponatremia 2019    Thrombocytopenia due to hypersplenism 2019     Past Surgical History:   Procedure Laterality Date     SECTION      CHOLECYSTECTOMY       OBJECTIVE     Current Medications:  Infusions:    Scheduled:   albumin human 25%  25 g Intravenous TID    ciprofloxacin HCl  500 mg Oral Daily    folic acid  1 mg Oral Daily    lactulose  45 g Oral TID    lidocaine  2 patch Transdermal Q24H    midodrine  15 mg Oral TID    octreotide  100 mcg Subcutaneous Q8H    pantoprazole  40 mg Oral Daily    phytonadione ((AQUA-MEPHYTON) IVPB  10 mg Intravenous Daily    rifAXImin  550 mg Oral BID    thiamine  100 mg Oral Daily     PRN:  acetaminophen, Dextrose 10% Bolus, Dextrose 10% Bolus, glucagon (human recombinant), glucose, glucose, polyethylene  "glycol, promethazine, ramelteon, sodium chloride 0.9%, traMADol  Home Medications:  Prior to Admission medications    Medication Sig Start Date End Date Taking? Authorizing Provider   magnesium citrate solution Take 296 mLs by mouth once.   Yes Historical Provider, MD   multivitamin (THERAGRAN) per tablet Take 1 tablet by mouth once daily.   Yes Historical Provider, MD   omeprazole (PRILOSEC) 20 MG capsule Take 20 mg by mouth once daily.   Yes Historical Provider, MD   spironolactone (ALDACTONE) 100 MG tablet Take 100 mg by mouth once daily.   Yes Historical Provider, MD     Vital Signs:  Vitals:    06/27/19 0751   BP: (!) 95/56   Pulse: 89   Resp: 16   Temp: 97.4 °F (36.3 °C)     Mental Status Exam: Unable to perform  Appearance: unremarkable, age appropriate, older than stated age  Level of Consciousness: ***  Behavior/Cooperation: {behavior:81868::"normal","cooperative"}  Psychomotor: {Psychomotoragit:61573}   Speech: {findings; speech:65509::"normal tone","normal rate","normal pitch","normal volume"}  Language: english, fluid  Orientation: {orientation:87510::"grossly intact"}  Attention Span/Concentration: {EXAM; ATTENTION SPAN WORLD:49773}  Memory: {Memory:20786}  Mood: "{mood:20777}"  Affect: {desc; affect:98080::"normal"}  Thought Process: linear, {thought process:73862::"normal and logical"}  Associations: {thought process:03877::"normal and logical"}  Thought Content: {thought content:03532::"normal, no suicidality, no homicidality, delusions, or paranoia"}  Fund of Knowledge: {PSY - FUND OF KNOWLEDGE:35032}  Abstraction: {MISC; ABSTRACT/CONCRETE:92801}  Insight: {insight:07808}  Judgment: {judgment:27841}    Labs/Imaging/Studies:   Recent Results (from the past 24 hour(s))   HIV 1/2 Ag/Ab (4th Gen)    Collection Time: 06/26/19 10:43 AM   Result Value Ref Range    HIV 1/2 Ag/Ab Negative Negative   Lactate dehydrogenase    Collection Time: 06/26/19 10:43 AM   Result Value Ref Range     110 - 260 U/L "   Osmolality, Serum    Collection Time: 06/26/19 10:43 AM   Result Value Ref Range    Osmolality 274 (L) 275 - 295 mOsm/kg   Toxicology screen, urine    Collection Time: 06/26/19 12:14 PM   Result Value Ref Range    Alcohol, Urine <10 <10 mg/dL    Benzodiazepines Negative     Methadone metabolites Negative     Cocaine (Metab.) Negative     Opiate Scrn, Ur Presumptive Positive     Barbiturate Screen, Ur Negative     Amphetamine Screen, Ur Negative     THC Negative     Phencyclidine Negative     Creatinine, Random Ur 53.0 15.0 - 325.0 mg/dL    Toxicology Information SEE COMMENT    Sodium, urine, random    Collection Time: 06/26/19 12:14 PM   Result Value Ref Range    Sodium Urine Random <20 (A) 20 - 250 mmol/L   Creatinine, urine, random    Collection Time: 06/26/19 12:14 PM   Result Value Ref Range    Creatinine, Random Ur 53.0 15.0 - 325.0 mg/dL   Osmolality, urine    Collection Time: 06/26/19 12:14 PM   Result Value Ref Range    Osmolality, Ur 331 50 - 1200 mOsm/kg   Pregnancy, urine rapid    Collection Time: 06/26/19 12:14 PM   Result Value Ref Range    Preg Test, Ur Negative    WBC & Diff,Body Fluid Peritoneal Fluid    Collection Time: 06/26/19  4:08 PM   Result Value Ref Range    Body Fluid Type Peritoneal Fluid     Fluid Appearance Clear     Fluid Color Yellow     WBC, Body Fluid 20 /cu mm    Segs, Fluid 19 %    Lymphs, Fluid 25 %    Monocytes/Macrophages, Fluid 31 %    Mesothelial cells, Fluid 25 %   Albumin, Peritoneal, Pleural Fluid or STANLEY Drainage, In-House Peritoneal Fluid    Collection Time: 06/26/19  4:08 PM   Result Value Ref Range    Body Fluid Source, Albumin Peritoneal Fluid     Body Fluid Albumin <0.3 See text g/dL   Protein, Peritoneal, Pleural Fluid or STANLEY Drainage, In-House Ascites    Collection Time: 06/26/19  4:08 PM   Result Value Ref Range    Body Fluid Source, Total Protein Ascites     Body Fluid, Protein <1.0 Not established g/dL   Gram stain    Collection Time: 06/26/19  4:09 PM   Result  Value Ref Range    Gram Stain Result No WBC's     Gram Stain Result No organisms seen    Immunoglobulins (IgG, IgA, IgM) Quantitative    Collection Time: 06/26/19  7:01 PM   Result Value Ref Range    IgG - Serum 1140 650 - 1600 mg/dL    IgA 998 (H) 40 - 350 mg/dL    IgM 120 50 - 300 mg/dL   Vitamin D    Collection Time: 06/26/19  7:01 PM   Result Value Ref Range    Vit D, 25-Hydroxy 13 (L) 30 - 96 ng/mL   Basic metabolic panel    Collection Time: 06/26/19  7:01 PM   Result Value Ref Range    Sodium 118 (LL) 136 - 145 mmol/L    Potassium 3.9 3.5 - 5.1 mmol/L    Chloride 86 (L) 95 - 110 mmol/L    CO2 22 (L) 23 - 29 mmol/L    Glucose 99 70 - 110 mg/dL    BUN, Bld 64 (H) 6 - 20 mg/dL    Creatinine 1.6 (H) 0.5 - 1.4 mg/dL    Calcium 9.6 8.7 - 10.5 mg/dL    Anion Gap 10 8 - 16 mmol/L    eGFR if African American 46.8 (A) >60 mL/min/1.73 m^2    eGFR if non African American 40.6 (A) >60 mL/min/1.73 m^2   Fibrinogen    Collection Time: 06/26/19  7:01 PM   Result Value Ref Range    Fibrinogen 287 182 - 366 mg/dL   Prealbumin    Collection Time: 06/26/19  7:01 PM   Result Value Ref Range    Prealbumin 3 (L) 20 - 43 mg/dL   Comprehensive Metabolic Panel (CMP)    Collection Time: 06/27/19  4:03 AM   Result Value Ref Range    Sodium 117 (LL) 136 - 145 mmol/L    Potassium 4.2 3.5 - 5.1 mmol/L    Chloride 87 (L) 95 - 110 mmol/L    CO2 18 (L) 23 - 29 mmol/L    Glucose 59 (L) 70 - 110 mg/dL    BUN, Bld 71 (H) 6 - 20 mg/dL    Creatinine 1.6 (H) 0.5 - 1.4 mg/dL    Calcium 9.3 8.7 - 10.5 mg/dL    Total Protein 5.1 (L) 6.0 - 8.4 g/dL    Albumin 1.8 (L) 3.5 - 5.2 g/dL    Total Bilirubin 25.4 (H) 0.1 - 1.0 mg/dL    Alkaline Phosphatase 318 (H) 55 - 135 U/L    AST 93 (H) 10 - 40 U/L    ALT 15 10 - 44 U/L    Anion Gap 12 8 - 16 mmol/L    eGFR if African American 46.8 (A) >60 mL/min/1.73 m^2    eGFR if non African American 40.6 (A) >60 mL/min/1.73 m^2   Magnesium    Collection Time: 06/27/19  4:03 AM   Result Value Ref Range    Magnesium 2.2  1.6 - 2.6 mg/dL   Phosphorus    Collection Time: 06/27/19  4:03 AM   Result Value Ref Range    Phosphorus 6.5 (H) 2.7 - 4.5 mg/dL   CBC with Automated Differential    Collection Time: 06/27/19  4:03 AM   Result Value Ref Range    WBC 23.88 (H) 3.90 - 12.70 K/uL    RBC 2.61 (L) 4.00 - 5.40 M/uL    Hemoglobin 9.4 (L) 12.0 - 16.0 g/dL    Hematocrit 25.5 (L) 37.0 - 48.5 %    Mean Corpuscular Volume 98 82 - 98 fL    Mean Corpuscular Hemoglobin 36.0 (H) 27.0 - 31.0 pg    Mean Corpuscular Hemoglobin Conc 36.9 (H) 32.0 - 36.0 g/dL    RDW 17.0 (H) 11.5 - 14.5 %    Platelets 55 (L) 150 - 350 K/uL    MPV SEE COMMENT 9.2 - 12.9 fL    Immature Granulocytes 0.9 (H) 0.0 - 0.5 %    Gran # (ANC) 20.5 (H) 1.8 - 7.7 K/uL    Immature Grans (Abs) 0.22 (H) 0.00 - 0.04 K/uL    Lymph # 1.3 1.0 - 4.8 K/uL    Mono # 1.2 (H) 0.3 - 1.0 K/uL    Eos # 0.6 (H) 0.0 - 0.5 K/uL    Baso # 0.08 0.00 - 0.20 K/uL    nRBC 0 0 /100 WBC    Gran% 86.1 (H) 38.0 - 73.0 %    Lymph% 5.4 (L) 18.0 - 48.0 %    Mono% 5.0 4.0 - 15.0 %    Eosinophil% 2.3 0.0 - 8.0 %    Basophil% 0.3 0.0 - 1.9 %    Platelet Estimate Decreased (A)     Aniso Slight     Poik Slight     Poly Occasional     Hypo Occasional     Ovalocytes Occasional     [place] Cells Moderate     Toxic Granulation Present     Differential Method Automated    PT/INR    Collection Time: 06/27/19  4:03 AM   Result Value Ref Range    Prothrombin Time 23.1 (H) 9.0 - 12.5 sec    INR 2.4 (H) 0.8 - 1.2   Cortisol, 8AM    Collection Time: 06/27/19  4:03 AM   Result Value Ref Range    Cortisol -8 AM 7.80 4.30 - 22.40 ug/dL   POCT glucose    Collection Time: 06/27/19  6:32 AM   Result Value Ref Range    POCT Glucose 60 (L) 70 - 110 mg/dL   POCT glucose    Collection Time: 06/27/19  7:48 AM   Result Value Ref Range    POCT Glucose 202 (H) 70 - 110 mg/dL        ASSESSMENT     Destinee HOROWITZ Elliott is a 38 y.o. female with a past psychiatric history of ***, who presented to [facility] on 6/26/2019 due to ***. Psychiatry was consulted to  address the patient's symptoms of {Psych eval reasons:36785}. ***    IMPRESSION  ***    RECOMMENDATION(S)     1. Scheduled Medication(s):  Per primary team    2. PRN Medication(s):  Per primary team    3. Other:  · HIGH risk at this time  · Recommend serial PETH testing  · Recommend serial urine toxicology screening  · Recommend referral to and completion of day program for education on alcohol abuse, for coping/behavioral skills to reinforce sobriety, as well as to attend AA meetings.    In cases of emergency, daily coverage provided by Acute/ER Psych MD/NP/SW, with contact numbers located in [facility] On Call Schedule    Case discussed with staff addiction psychiatrist: Uma Jacob MD

## 2019-06-27 NOTE — ASSESSMENT & PLAN NOTE
Based on description and stable hemoglobin suspect PHG however in the setting of cirrhosis would also included EV in the differential    Recommendations:  --Monitor for overt signs of bleeding  --Trend H/H and transfuse as needed  --Protonix 40 mg IV BID  --Octreotide gtt  --We will discuss if GI about adding for EGD tomorrow, however if continued coffee ground emesis, melena, decreasing H/H or HD instability recommend placing a formal  GI consult

## 2019-06-27 NOTE — PLAN OF CARE
Problem: Adult Inpatient Plan of Care  Goal: Plan of Care Review  Outcome: Ongoing (interventions implemented as appropriate)  Recommendations     1. Continue current Low sodium diet, fluid restriction per MD.   2. Add Boost Plus ONS to aid in caloric intake.   3. RD to monitor & follow-up.

## 2019-06-27 NOTE — CONSULTS
"  [facility]  Adult Nutrition  Consult Note    SUMMARY     Recommendations    1. Continue current Low sodium diet, fluid restriction per MD.   2. Add Boost Plus ONS to aid in caloric intake.   3. RD to monitor & follow-up.    Goals: PO intake >50%  Nutrition Goal Status: new  Communication of RD Recs: reviewed with RN    Reason for Assessment    Reason For Assessment: consult  Diagnosis: other (see comments)(Liver fx; Liver tx work-up)  Relevant Medical History: Cirrhosis, Etoh abuse  Interdisciplinary Rounds: did not attend    General Information Comments: Pt lethargic at time of visit. Reports poor appetite PTA x 1 month & UBW: 118#. Difficult to assess for malnutrition at this time, pt declined NFPE. Low sodium diet education reinforced. Paracentesis complete - 3.4L removed. Pt currently on low sodium diet w/ poor PO intake. Willing to try ONS.   Nutrition Discharge Planning: Adequate PO intake    Nutrition/Diet History    Patient Reported Diet/Restrictions/Preferences: general, low salt  Spiritual, Cultural Beliefs, Islam Practices, Values that Affect Care: no  Factors Affecting Nutritional Intake: early satiety, decreased appetite    Anthropometrics    Temp: 97.4 °F (36.3 °C)  Height: 5' 4" (162.6 cm)  Height (inches): 64 in  Weight Method: Standard Scale  Weight: 60.5 kg (133 lb 6.1 oz)  Weight (lb): 133.38 lb  Ideal Body Weight (IBW), Female: 120 lb  % Ideal Body Weight, Female (lb): 111.15 lb  BMI (Calculated): 22.9  BMI Grade: 18.5-24.9 - normal  Usual Body Weight (UBW), k.6 kg  % Usual Body Weight: 113.11  % Weight Change From Usual Weight: 12.87 %    Lab/Procedures/Meds    Pertinent Labs Reviewed: reviewed  Pertinent Labs Comments: Na 117, BUN 71, Creat 1.6, GFR 40.6, P 6.5, Bili 25.4  Pertinent Medications Reviewed: reviewed  Pertinent Medications Comments: Folic acid, Thiamine, Lactulose    Estimated/Assessed Needs    Weight Used For Calorie Calculations: 60.5 kg (133 lb 6.1 " oz)     Energy Calorie Requirements (kcal): 1588 kcal/d  Energy Need Method: [place]-St Jeor(1.25 PAL)     Protein Requirements: 73 g/d (1.2 g/kg)  Weight Used For Protein Calculations: 60.5 kg (133 lb 6.1 oz)     Estimated Fluid Requirement Method: other (see comments)(Per MD or 1 mL/kcal)     Nutrition Prescription Ordered    Current Diet Order: Low sodium  Nutrition Order Comments: 1000 mL FR    Evaluation of Received Nutrient/Fluid Intake    Comments: LBM: 6/26    Tolerance: tolerating    Nutrition Risk    Level of Risk/Frequency of Follow-up: (2x/week)     Assessment and Plan    Nutrition Problem  Increased nutrient needs    Related to (etiology):   Physiological causes     Signs and Symptoms (as evidenced by):   Liver tx work-up    Interventions/Recommendations (treatment strategy):  Collaboration of nutrition care w/ other providers     Nutrition Diagnosis Status:   New     Monitor and Evaluation    Food and Nutrient Intake: energy intake, food and beverage intake  Food and Nutrient Adminstration: diet order  Physical Activity and Function: nutrition-related ADLs and IADLs  Anthropometric Measurements: weight, weight change  Biochemical Data, Medical Tests and Procedures: electrolyte and renal panel, gastrointestinal profile, glucose/endocrine profile, inflammatory profile, lipid profile  Nutrition-Focused Physical Findings: overall appearance     Malnutrition Assessment    Energy Intake (Malnutrition): less than 75% for greater than or equal to 1 month   RD to complete NFPE at time of follow-up.    Nutrition Follow-Up    RD Follow-up?: Yes

## 2019-06-27 NOTE — CONSULTS
"6/27/2019 7:49 AM  Destinee Gallagher  1980  [id number]    Addiction Psychiatry Transplant Evaluation Consult Note    Consult Requested By: Marisa Banks MD    SUBJECTIVE     History of Present Illness:   Destinee Gallagher is a 38 y.o. female with a past psychiatric history of Alcohol use disorder, severe, Bipolar Disorder, who presented to [facility] on 6/26/2019 for acute liver failure. Psychiatry was consulted for "addiction psych, etoh hepatitis and decomp cirrhosis, liver txp eval".    Per Primary Team:  Ms. Destinee Gallagher is a 38 y.o. female with hx of ETOH cirrhosis and ETOH hepatitis, hx of alcohol abuse disorder/ severe alcohol dependence, prior history of coagulopathy and thrombocytopenia, as well as history of SBP, presented to [facility] on 06/26/2019 as a transfer from  [facility] in [place], [place], for liver transplantation evaluation as well as hepatology evaluation. Patient was admitted to OSH 6/6 with abdominal pain, nausea, decreased oral intake, and visibly jaundice.  Over past 2.5 weeks there , pt has been diagnosed with and treated for SBP.  She has been continued on diuretics, but ascites has been resistant to diuretics , requiring multiple paracenteses (last LVP several days prior to transfer).  Most recent paracentesis did not meet criteria for SBP, and had no PMNs, but pt remained on Rocephin for leukocytosis of unclear source.  She has been encephalopathic but slowly improving with increased doses of Lactulose and Rifaxamin.  Pt has not improved with current treatment, thus requesting liver transplant evaluation, Case d/w Dr. Posadas, patient transferred to [facility]. MELD 35 upon presentation at [facility]. Last ETOH drink on 6/3     Upon presentation at [facility], patient was afebrile with systolic blood pressures in the 90s satting 95% on room air.  She was mildly encephalopathic, however was able to participate in conversation,  at bedside.  Meld 35 on presentation.  Labs notable for total " "bilirubin of 28, sodium of 118 (patient has been getting in diuretics at the outside hospital), INR of 2.4.  WBC of 23, platelets 54.      Upon further questioning regarding patient's disease process, she stated that she has had history of heavy alcohol use, most recently 1-2 pt of vodka per day.  Patient's last alcoholic drink was on 06/03/2019, approximately.  Patient did not state that she has been binge drinking, as noted above 2 pt of vodka per day, prior to her admission at outside hospital.  Patient also uses tobacco.  She stated that she was 1st diagnosed with alcoholic cirrhosis and alcoholic hepatitis in August 2018 and was advised to stop drinking at that time.  Patient has had multiple relapses since then.  It appears the patient has failed multiple alcohol rehabs in the past.  In the last couple of months patient has had recurrent problem with worsening ascites and abdominal distention.    Per Addiction Psych:  Chart reviewed. UDS + opiates and BAL <10 on admission. AST 93, ALT 15, TBili 25.4, Alk Phos 318. PETH pending.    Patient drowsy upon interview, so most of history is obtained from patient's , Kingsley, who is at bedside. Patient first started drinking at 16yo. Per , patient drank 1 pint of vodka daily for several years, but had been drinking 2 pints daily for the past six months. Patient's last drink was around 6/2-6/3, and patient has not had a drink because "she has been too sick".  reports patient failing multiple rehabs in the past. Longest period of sobriety was for 1 year following a rehab program in [place]. She relapsed when they visited [facility]. No previous history of withdrawal seizures although  reports that patient has been recently been encephalopathic and had visual hallucinations possibly associated with elevated ammonia in the past month. Withdrawals usually consist of night sweats, nausea/vomiting, and tremors. Patient denies any illicit drug use " although  reports that she smoked marijuana as a young adult. Given patient's current medical condition and becoming a candidate for transplant, she is motivated to become sober. Both  and patient are motivated for rehab - they currently reside in [place] but would be open to rehab here locally while she undergoes evaluation for liver transplant and (hopefully) eventual transplant.      reports a history of being 'manic depressive', and manic episodes consist of periods with decreased need for sleep and excessive spending. Reports previous med trials of Lithium and Depakote and antidepressants, but patient has not been on any psychiatric medications and  cannot recall last manic episode. Does admit that patient has high anxiety. Patient stable from a psychiatric standpoint - patient denies SI, HI, and AVH and does not demonstrate objective signs/symptoms of erum, psychosis, or affective instability to the point of suicidality or homicidality.  does admit that patient demonstrated paranoia and had VH of people who were not there earlier this month, and believes this was 2/2 elevated ammonia.     Substance Abuse History:  Substance of Choice: alcohol  Substances Used: remote history of marijuana use when younger; none current other than alcohol   History of IVDU?: No  Use of Alcohol: heavy  Average Consumption: Yes - 2 pints of vodka daily  Last Drink: Yes - Gayla 2-3  Use of Medications for Alcohol/Opioid Use Disorder: Yes - per  (while she was in rehab), but does not know the specific medications. He states that they always caused GI upset  History of Complicated Withdrawal(s): Yes -  reports recent VH likely associated with elevated ammonia in the past month  History of Detox: Yes - multiple, most recently at [facility] in [place] last year   Rehab History: Yes - multiple  AA/NA involvement: Yes - believes they are helpful but most AA groups are all men and she  prefers groups with women  Tobacco: Yes - 1ppd  Spouse/Partner Consumption: Yes - occasional use  Patient Aware of Biomedical Complications: Yes    DSM-5 Substance Use Disorder Criteria:  1. Often take in larger amounts or over a longer period of time than was intended: Yes  2. Persistent desire or unsuccessful efforts to cut down or control use: Yes  3. Great deal of time spent in activities necessary to obtain substance, use, or recover from effects: Yes  4. Craving/strong desire for substance or urge to use: Yes  5. Use resulting in failure to fulfill major role obligations at home, work or school: Yes  6. Social, occupational, recreational activities decreased because of use: Yes  7. Continued use despite having persistent or recurrent social or interpersonal problems cause or exaserbated by the substance: Yes  8. Recurrent use in situations in which it is physically hazardous: Yes, DUI  9. Use despite physical or psychological problems that are likely to have been caused or exacerbated by the substance: Yes  10. Tolerance, as defined by either of the following: Yes   A. A need for markedly increased amounts of substance to achieve intoxication or desired effect. -OR-    B. A markedly diminished effect with continued use of the same amount of substance.  11. Withdrawal, as manifested by the following: Yes   A. The characteristic withdrawal syndrome for substance. -AND-   B. Substance is taken to relieve or avoid withdrawal symptoms.  Mild (1-3), Moderate (4-5), Severe (?6)    Transplant Evaluation:  1st informed of impending organ failure - Yes - June 2018  When was the subject first of transplantation first broached - Yes - earlier this month  Pt made the following lifestyle adjustments and how - Patient attempted rehab last year and wanted to start getting healthier to return to work and exercise but was too weak too  View of AA - Yes - helpful although patient prefers AA meetings with women  Has addiction  "affected your organ failure - Yes  Last use of substances - Yes - Gayla 2-3  Psychiatric diagnosis - Yes - "manic depressive" per   Understand need for lifetime medication - Yes  Expectations - Yes - live longer  Social support - Yes -   Including pre & post-op - Yes -     Psychiatric History:  Diagnose(s): Yes - "manic depressive"  Previous Medication Trials: Yes - Lithium, Depakote, antidepressants  Previous Psychiatric Hospitalizations: Yes - was in a dual diagnosis unit  Family Psychiatric History: Yes - maternal uncles with alcoholism, substance use runs in family  Outpatient Psychiatrist: No    Suicide/Violence Risk Assessment:  Current/active suicidal ideation/plan/intent: No  Previous suicide attempts: Yes  Current/active homicidal ideation/plan/intent: No  History of threats/arrests associated with violent conduct - No  Access to firearms/lethal weapons - Yes -  has two guns but they are locked up    Social History:  Marital Status:   Children: 2 - 14yo and 14yo  Employment Status: unemployed - unable to work 2/2 alcoholism  Education: high school diploma/GED  Special Ed: no  Housing Status: Yes - with  and two children  Developmental History: Unremarkable  History of Abuse: Yes     Legal History:  Past Charges/Incarcerations: Yes - DUI  Pending Charges: No    Psychosocial Factors:  Stressors: health,  says that finances stress out patient but are financially stable   Functioning Relationships: good support system  Maladaptive or problem behaviors: No  Peer group, social, ethic, cultural, emotional, and health factors: No  Living situation, family constellation, family circumstances/home: No  Recovery environment: No  Community resources used by patient: Yes - attempted rehab multiple times, AA meetings  Treatment acceptance/motivation for change: Yes     Collateral:   Yes - Kingsley (), obtained at bedside (see HPI)    Psychiatric Review of Systems:  sleep: " yes, poor  appetite: yes, poor  weight: no  energy/anergy: yes, poor  interest/pleasure/anhedonia: no  somatic symptoms: no  guilty/hopelessness: yes  concentration: yes  S.I.B.s/risky behavior: no  SI/SA:  no    anxiety/panic: yes  Agoraphobia:  no  Social phobia:  no  Recurrent nightmares:  yes  hyper startle response:  no  Avoidance: no  Recurrent thoughts:  no  Recurrent behaviors:  no    Irritability: yes  Racing thoughts: no  Impulsive behaviors: no  Pressured speech:  no    Paranoia:no;  reports her being suspicious of others earlier this month though  Delusions: no  AVH:no, but  reports patient having VH of people who were not there earlier this month     Medical Review Of Systems:  Pertinent items noted in HPI    Allergies:  Zofran [ondansetron hcl (pf)]  Medical/Surgical History:  Past Medical History:   Diagnosis Date    Acute alcoholic hepatitis 2019    Acute liver failure without hepatic coma 2019    Alcohol use disorder, severe, dependence 2019    Coagulopathy 2019    Hepatic encephalopathy 2019    Hyponatremia 2019    Thrombocytopenia due to hypersplenism 2019     Past Surgical History:   Procedure Laterality Date     SECTION      CHOLECYSTECTOMY       OBJECTIVE     Current Medications:  Infusions:   sodium chloride 0.9% 125 mL/hr at 19 0652     Scheduled:   ciprofloxacin HCl  500 mg Oral Daily    folic acid  1 mg Oral Daily    lactulose  20 g Oral TID    lidocaine  2 patch Transdermal Q24H    midodrine  5 mg Oral TID    pantoprazole  40 mg Oral Daily    phytonadione ((AQUA-MEPHYTON) IVPB  10 mg Intravenous Daily    rifAXImin  550 mg Oral BID    thiamine  100 mg Oral Daily     PRN:  acetaminophen, Dextrose 10% Bolus, Dextrose 10% Bolus, glucagon (human recombinant), glucose, glucose, polyethylene glycol, promethazine, ramelteon, sodium chloride 0.9%, traMADol  Home Medications:  Prior to Admission medications     Medication Sig Start Date End Date Taking? Authorizing Provider   magnesium citrate solution Take 296 mLs by mouth once.   Yes Historical Provider, MD   multivitamin (THERAGRAN) per tablet Take 1 tablet by mouth once daily.   Yes Historical Provider, MD   omeprazole (PRILOSEC) 20 MG capsule Take 20 mg by mouth once daily.   Yes Historical Provider, MD   spironolactone (ALDACTONE) 100 MG tablet Take 100 mg by mouth once daily.   Yes Historical Provider, MD     Vital Signs:  Vitals:    06/27/19 0738   BP:    Pulse: 86   Resp:    Temp:      Mental Status Exam:  Appearance: unremarkable, age appropriate, lying in bed, jaundice-appearing  Level of Consciousness: drowsy   Behavior/Cooperation: minimal cooperation 2/2 drowsiness  Psychomotor: unremarkable   Speech: slowed, soft  Language: english, fluid  Orientation: unable to assess 2/2 drowsiness  Attention Span/Concentration:  unable to assess 2/2 drowsiness  Memory: unable to assess 2/2 drowsiness  Mood: unable to assess 2/2 drowsiness  Affect: unable to assess 2/2 drowsiness  Thought Process: linear, normal and logical  Associations: normal and logical  Thought Content: normal, no suicidality, no homicidality, delusions, or paranoia  Fund of Knowledge: unable to assess  Abstraction: unable to assess 2/2 drowsiness  Insight: improving  Judgment: improving    Labs/Imaging/Studies:   Recent Results (from the past 24 hour(s))   Blood culture    Collection Time: 06/26/19  7:51 AM   Result Value Ref Range    Blood Culture, Routine No Growth to date    Procalcitonin    Collection Time: 06/26/19  8:00 AM   Result Value Ref Range    Procalcitonin 0.40 (H) <0.25 ng/mL   Hepatitis panel, acute    Collection Time: 06/26/19  8:01 AM   Result Value Ref Range    Hepatitis B Surface Ag Negative     Hep B C IgM Negative     Hep A IgM Negative     Hepatitis C Ab Negative    CBC auto differential    Collection Time: 06/26/19  8:02 AM   Result Value Ref Range    WBC 23.39 (H) 3.90 -  12.70 K/uL    RBC 2.98 (L) 4.00 - 5.40 M/uL    Hemoglobin 10.4 (L) 12.0 - 16.0 g/dL    Hematocrit 30.3 (L) 37.0 - 48.5 %    Mean Corpuscular Volume 102 (H) 82 - 98 fL    Mean Corpuscular Hemoglobin 34.9 (H) 27.0 - 31.0 pg    Mean Corpuscular Hemoglobin Conc 34.3 32.0 - 36.0 g/dL    RDW 17.4 (H) 11.5 - 14.5 %    Platelets 54 (L) 150 - 350 K/uL    MPV 13.7 (H) 9.2 - 12.9 fL    Immature Granulocytes 0.9 (H) 0.0 - 0.5 %    Gran # (ANC) 20.4 (H) 1.8 - 7.7 K/uL    Immature Grans (Abs) 0.22 (H) 0.00 - 0.04 K/uL    Lymph # 1.1 1.0 - 4.8 K/uL    Mono # 1.2 (H) 0.3 - 1.0 K/uL    Eos # 0.4 0.0 - 0.5 K/uL    Baso # 0.10 0.00 - 0.20 K/uL    nRBC 0 0 /100 WBC    Gran% 87.1 (H) 38.0 - 73.0 %    Lymph% 4.7 (L) 18.0 - 48.0 %    Mono% 5.3 4.0 - 15.0 %    Eosinophil% 1.6 0.0 - 8.0 %    Basophil% 0.4 0.0 - 1.9 %    Platelet Estimate Decreased (A)     Aniso Slight     Poik Slight     [place] Cells Occasional     Schistocytes Present     Differential Method Automated    Comprehensive metabolic panel    Collection Time: 06/26/19  8:02 AM   Result Value Ref Range    Sodium 118 (LL) 136 - 145 mmol/L    Potassium 4.0 3.5 - 5.1 mmol/L    Chloride 88 (L) 95 - 110 mmol/L    CO2 18 (L) 23 - 29 mmol/L    Glucose 72 70 - 110 mg/dL    BUN, Bld 64 (H) 6 - 20 mg/dL    Creatinine 1.3 0.5 - 1.4 mg/dL    Calcium 9.7 8.7 - 10.5 mg/dL    Total Protein 5.8 (L) 6.0 - 8.4 g/dL    Albumin 2.0 (L) 3.5 - 5.2 g/dL    Total Bilirubin 28.4 (H) 0.1 - 1.0 mg/dL    Alkaline Phosphatase 345 (H) 55 - 135 U/L    AST 96 (H) 10 - 40 U/L    ALT 15 10 - 44 U/L    Anion Gap 12 8 - 16 mmol/L    eGFR if African American >60.0 >60 mL/min/1.73 m^2    eGFR if non  52.2 (A) >60 mL/min/1.73 m^2   Magnesium    Collection Time: 06/26/19  8:02 AM   Result Value Ref Range    Magnesium 2.1 1.6 - 2.6 mg/dL   Phosphorus    Collection Time: 06/26/19  8:02 AM   Result Value Ref Range    Phosphorus 6.2 (H) 2.7 - 4.5 mg/dL   Protime-INR    Collection Time: 06/26/19  8:02 AM    Result Value Ref Range    Prothrombin Time 23.4 (H) 9.0 - 12.5 sec    INR 2.4 (H) 0.8 - 1.2   Type & Screen    Collection Time: 06/26/19  8:02 AM   Result Value Ref Range    Group & Rh O POS     Indirect Michael NEG    Blood culture    Collection Time: 06/26/19  8:02 AM   Result Value Ref Range    Blood Culture, Routine No Growth to date    Bilirubin, direct    Collection Time: 06/26/19  8:02 AM   Result Value Ref Range    Bilirubin, Direct >14.0 (H) 0.1 - 0.3 mg/dL   HIV 1/2 Ag/Ab (4th Gen)    Collection Time: 06/26/19 10:43 AM   Result Value Ref Range    HIV 1/2 Ag/Ab Negative Negative   Lactate dehydrogenase    Collection Time: 06/26/19 10:43 AM   Result Value Ref Range     110 - 260 U/L   Osmolality, Serum    Collection Time: 06/26/19 10:43 AM   Result Value Ref Range    Osmolality 274 (L) 275 - 295 mOsm/kg   Toxicology screen, urine    Collection Time: 06/26/19 12:14 PM   Result Value Ref Range    Alcohol, Urine <10 <10 mg/dL    Benzodiazepines Negative     Methadone metabolites Negative     Cocaine (Metab.) Negative     Opiate Scrn, Ur Presumptive Positive     Barbiturate Screen, Ur Negative     Amphetamine Screen, Ur Negative     THC Negative     Phencyclidine Negative     Creatinine, Random Ur 53.0 15.0 - 325.0 mg/dL    Toxicology Information SEE COMMENT    Sodium, urine, random    Collection Time: 06/26/19 12:14 PM   Result Value Ref Range    Sodium Urine Random <20 (A) 20 - 250 mmol/L   Creatinine, urine, random    Collection Time: 06/26/19 12:14 PM   Result Value Ref Range    Creatinine, Random Ur 53.0 15.0 - 325.0 mg/dL   Osmolality, urine    Collection Time: 06/26/19 12:14 PM   Result Value Ref Range    Osmolality, Ur 331 50 - 1200 mOsm/kg   Pregnancy, urine rapid    Collection Time: 06/26/19 12:14 PM   Result Value Ref Range    Preg Test, Ur Negative    WBC & Diff,Body Fluid Peritoneal Fluid    Collection Time: 06/26/19  4:08 PM   Result Value Ref Range    Body Fluid Type Peritoneal Fluid      Fluid Appearance Clear     Fluid Color Yellow     WBC, Body Fluid 20 /cu mm    Segs, Fluid 19 %    Lymphs, Fluid 25 %    Monocytes/Macrophages, Fluid 31 %    Mesothelial cells, Fluid 25 %   Albumin, Peritoneal, Pleural Fluid or STANLEY Drainage, In-House Peritoneal Fluid    Collection Time: 06/26/19  4:08 PM   Result Value Ref Range    Body Fluid Source, Albumin Peritoneal Fluid     Body Fluid Albumin <0.3 See text g/dL   Protein, Peritoneal, Pleural Fluid or STANLEY Drainage, In-House Ascites    Collection Time: 06/26/19  4:08 PM   Result Value Ref Range    Body Fluid Source, Total Protein Ascites     Body Fluid, Protein <1.0 Not established g/dL   Gram stain    Collection Time: 06/26/19  4:09 PM   Result Value Ref Range    Gram Stain Result No WBC's     Gram Stain Result No organisms seen    Immunoglobulins (IgG, IgA, IgM) Quantitative    Collection Time: 06/26/19  7:01 PM   Result Value Ref Range    IgG - Serum 1140 650 - 1600 mg/dL    IgA 998 (H) 40 - 350 mg/dL    IgM 120 50 - 300 mg/dL   Vitamin D    Collection Time: 06/26/19  7:01 PM   Result Value Ref Range    Vit D, 25-Hydroxy 13 (L) 30 - 96 ng/mL   Basic metabolic panel    Collection Time: 06/26/19  7:01 PM   Result Value Ref Range    Sodium 118 (LL) 136 - 145 mmol/L    Potassium 3.9 3.5 - 5.1 mmol/L    Chloride 86 (L) 95 - 110 mmol/L    CO2 22 (L) 23 - 29 mmol/L    Glucose 99 70 - 110 mg/dL    BUN, Bld 64 (H) 6 - 20 mg/dL    Creatinine 1.6 (H) 0.5 - 1.4 mg/dL    Calcium 9.6 8.7 - 10.5 mg/dL    Anion Gap 10 8 - 16 mmol/L    eGFR if African American 46.8 (A) >60 mL/min/1.73 m^2    eGFR if non African American 40.6 (A) >60 mL/min/1.73 m^2   Fibrinogen    Collection Time: 06/26/19  7:01 PM   Result Value Ref Range    Fibrinogen 287 182 - 366 mg/dL   Prealbumin    Collection Time: 06/26/19  7:01 PM   Result Value Ref Range    Prealbumin 3 (L) 20 - 43 mg/dL   Comprehensive Metabolic Panel (CMP)    Collection Time: 06/27/19  4:03 AM   Result Value Ref Range    Sodium 117  (LL) 136 - 145 mmol/L    Potassium 4.2 3.5 - 5.1 mmol/L    Chloride 87 (L) 95 - 110 mmol/L    CO2 18 (L) 23 - 29 mmol/L    Glucose 59 (L) 70 - 110 mg/dL    BUN, Bld 71 (H) 6 - 20 mg/dL    Creatinine 1.6 (H) 0.5 - 1.4 mg/dL    Calcium 9.3 8.7 - 10.5 mg/dL    Total Protein 5.1 (L) 6.0 - 8.4 g/dL    Albumin 1.8 (L) 3.5 - 5.2 g/dL    Total Bilirubin 25.4 (H) 0.1 - 1.0 mg/dL    Alkaline Phosphatase 318 (H) 55 - 135 U/L    AST 93 (H) 10 - 40 U/L    ALT 15 10 - 44 U/L    Anion Gap 12 8 - 16 mmol/L    eGFR if African American 46.8 (A) >60 mL/min/1.73 m^2    eGFR if non African American 40.6 (A) >60 mL/min/1.73 m^2   Magnesium    Collection Time: 06/27/19  4:03 AM   Result Value Ref Range    Magnesium 2.2 1.6 - 2.6 mg/dL   Phosphorus    Collection Time: 06/27/19  4:03 AM   Result Value Ref Range    Phosphorus 6.5 (H) 2.7 - 4.5 mg/dL   CBC with Automated Differential    Collection Time: 06/27/19  4:03 AM   Result Value Ref Range    WBC 23.88 (H) 3.90 - 12.70 K/uL    RBC 2.61 (L) 4.00 - 5.40 M/uL    Hemoglobin 9.4 (L) 12.0 - 16.0 g/dL    Hematocrit 25.5 (L) 37.0 - 48.5 %    Mean Corpuscular Volume 98 82 - 98 fL    Mean Corpuscular Hemoglobin 36.0 (H) 27.0 - 31.0 pg    Mean Corpuscular Hemoglobin Conc 36.9 (H) 32.0 - 36.0 g/dL    RDW 17.0 (H) 11.5 - 14.5 %    Platelets 55 (L) 150 - 350 K/uL    MPV SEE COMMENT 9.2 - 12.9 fL    Immature Granulocytes 0.9 (H) 0.0 - 0.5 %    Gran # (ANC) 20.5 (H) 1.8 - 7.7 K/uL    Immature Grans (Abs) 0.22 (H) 0.00 - 0.04 K/uL    Lymph # 1.3 1.0 - 4.8 K/uL    Mono # 1.2 (H) 0.3 - 1.0 K/uL    Eos # 0.6 (H) 0.0 - 0.5 K/uL    Baso # 0.08 0.00 - 0.20 K/uL    nRBC 0 0 /100 WBC    Gran% 86.1 (H) 38.0 - 73.0 %    Lymph% 5.4 (L) 18.0 - 48.0 %    Mono% 5.0 4.0 - 15.0 %    Eosinophil% 2.3 0.0 - 8.0 %    Basophil% 0.3 0.0 - 1.9 %    Platelet Estimate Decreased (A)     Aniso Slight     Poik Slight     Poly Occasional     Hypo Occasional     Ovalocytes Occasional     [place] Cells Moderate     Toxic Granulation  "Present     Differential Method Automated    PT/INR    Collection Time: 06/27/19  4:03 AM   Result Value Ref Range    Prothrombin Time 23.1 (H) 9.0 - 12.5 sec    INR 2.4 (H) 0.8 - 1.2   Cortisol, 8AM    Collection Time: 06/27/19  4:03 AM   Result Value Ref Range    Cortisol -8 AM 7.80 4.30 - 22.40 ug/dL   POCT glucose    Collection Time: 06/27/19  6:32 AM   Result Value Ref Range    POCT Glucose 60 (L) 70 - 110 mg/dL        ASSESSMENT     Destinee Gallagher is a 38 y.o. female with a past psychiatric history of Alcohol use disorder, severe, Bipolar Disorder, who [place] [facility] [place] for acute liver failure. Psychiatry was consulted for "addiction psych, etoh hepatitis and decomp cirrhosis, liver txp eval".    HPI limited 2/2 patient drowsiness, but patient's  provides most of history. Chronic history of alcohol use, 1-2 pints vodka daily and has failed rehab and detox multiple times. Longest period of sobriety was one year after completing a rehab, and is usually able to stay sober for 3-4 months following rehab. Given patient's current medical condition and need for liver transplant, both patient and  are motivated for sobriety. Patient is regrettably a high-risk candidate for liver transplant given the following: failed rehab multiple times, recent alcohol use, persistent prior use despite knowledge of biomedical complications. Residential rehab is indicated for patient, and after patient completes rehab and has maintained sobriety, could be then considered modifiably-high risk.    IMPRESSION  Alcohol use disorder, severe  History of Bipolar Disorder    RECOMMENDATION(S)     1. Scheduled Medication(s):  Per primary team    2. PRN Medication(s):  Per primary team    3. Other:  · HIGH risk at this time  · Recommend serial PETH testing  · Recommend serial urine toxicology screening  · Recommend referral to and completion of day program for education on alcohol abuse, for coping/behavioral skills to " reinforce sobriety, as well as to attend AA meetings.    In cases of emergency, daily coverage provided by Acute/ER Psych MD/NP/SW, with contact numbers located in [facility] On Call Schedule    Case discussed with staff addiction psychiatrist: MD Zuleika Hastings, DO   Psychiatry  [facility]      Staff note : I, Matthew Espinosa MD have personally seen and evaluated the patient , and reviewed the initial history and exam. I agree and concur with the current assessment and plan.Pt's recent sobriety is essentially bc she has been to ill to drink.  I have met with [facility] who understands pt is high risk and is need of rehab before transplantation as above.

## 2019-06-27 NOTE — PLAN OF CARE
Problem: Adult Inpatient Plan of Care  Goal: Plan of Care Review  Outcome: Ongoing (interventions implemented as appropriate)     06/27/19 2403   Plan of Care Review   Plan of Care Reviewed With patient;spouse   AAO x3, c/o lower back /flank pain : oral tramadol ,refused pain patches : continues lactulose regimen , no bowel movements : TTE pending : s/p paracentesis , abdomen slightly distended : requires supervision with ambulation , pt noted with unsteady gait at times ; fall precautions inplace, bed alarm : safety rounds performed : low b/p , midodrine : will continue to monitor.

## 2019-06-27 NOTE — PROGRESS NOTES
"Transplant Recipient Adult Psychosocial Assessment-Inpatient Evaluation completed with pt's , Clayton "Kingsley" Gallagher due to pt being asleep and reported to be encephalopathic     Destinee Gallagher  [address]  [place] [address]  No relevant phone numbers on file.   Home  [phone] (home)  Work  There is no work phone number on file.  E-mail  [email or web address]    Sex: female  YOB: 1980  Age: 38 y.o.    Encounter Date: 2019  [place] Citizen: yes  Primary Language: English   Needed: no    Emergency Contact:  Name: Clayton Gallagher  Relationship:   Address: same address as patient  Phone Numbers:  [phone] (mobile)    Family/Social Support:   Number of dependents/: The patient has two minor children who depend upon her for care:  [place] (14) and Ozzie (13).  The patient's mother and in-laws are providing care for the pt's children.   Marital history: The patient has been  once and currently for 16 years.   Other family dynamics: The patient's mother, Beba (60's), is alive and resides in [place], [place].  The patient's father is  and pt does have siblings: one sister and one brother.      Household Composition:  Name: Destinee Gallagher  Age: 38  Relationship: patient  Does person drive? no    Name: Clayton Gallagher  Age: 42  Relationship:   Does person drive? yes    The patient's two children also reside within the home.     Do you and your caregivers have access to reliable transportation? yes  PRIMARY CAREGIVER: Clayton Gallagher will be primary caregiver, phone number [phone].      provided in-depth information to patient and caregiver regarding pre- and post-transplant caregiver role.   strongly encourages patient and caregiver to have concrete plan regarding post-transplant care giving, including back-up caregiver(s) to ensure care giving needs are met as needed.    Caregiver states understanding all aspects of caregiver " role/commitment and is able/willing/committed to being caregiver to the fullest extent necessary.    Caregiver verbalizes understanding of the education provided today and caregiver responsibilities.         remains available. Caregiver agree to contact  in a timely manner if concerns arise.      Able to take time off work without financial concerns: yes.  Pt's  will utilize FMLA.    Additional Significant Others who will Assist with Transplant:  Pt has not identified a secondary caregiver at this time.  Pt's  will contact the SW team once he has spoken with family and friends to provide contact information accordingly.     Living Will: no  Healthcare Power of : no  Advance Directives on file: <<no information> per medical record.  Verbally reviewed LW/HCPA information.   provided patient with copy of LW/HCPA documents and provided education on completion of forms.    Living Donors: N/A    Highest Education Level: High School (9-12) or GED; GED  Reading Ability: 12th grade  Reports difficulty with: vision (wears glasses for sight); memory/comprehension, walking and sitting due to liver issues;   Learns Best By:  Written information     Status: no  VA Benefits: no     Working for Income: No  If no, reason not working: Patient Choice - Homemaker  Patient is unemployed and is currently a homemaker.  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    Spouse/Significant Other Employment: Pt's  is employed with [employer] as a area .     Disabled: no    Monthly Income:  Salary/Wages: $0 (Spouse $4969)  Able to afford all costs now and if transplanted, including medications: yes  Caregiver verbalizes understanding of personal responsibilities related to transplant costs and the importance of having a financial plan to ensure that patients transplant costs are fully covered.       provided fundraising information/education.  Caregiver verbalizes understanding.   remains available.    Insurance:   Payor/Plan Subscr  Sex Relation Sub. Ins. ID Effective Group Num   1. [facility]* KIKE MARC 1980 Female Self [id number]                                    [address] [address]   2. [facility] access hospital* BHAVANI MARC 1976 Female  [id number]                                   [address]     Primary Insurance (for UNOS reporting): Private Insurance-[facility]  Secondary Insurance (for UNOS reporting): None  Caregiver verbalizes clear understanding that patient may experience difficulty obtaining and/or be denied insurance coverage post-surgery. This includes and is not limited to disability insurance, life insurance, health insurance, burial insurance, long term care insurance, and other insurances.    Caregiver also reports understanding that future health concerns related to or unrelated to transplantation may not be covered by patient's insurance.  Resources and information provided and reviewed.      Caregiver provides verbal permission to release any necessary information to outside resources for patient care and discharge planning.  Resources and information provided are reviewed.      Dialysis Adherence:  Caregiver reports the pt has never received dialysis.      Infusion Service: patient utilizing? no  Home Health: patient utilizing? no  DME: yes; blood pressure monitor  Pulmonary/Cardiac Rehab: no   ADLS:  Pt's  reports the pt was solely independent prior to hospitalization; however, was not driving.    Adherence:   Pt's  reports the pt positively adheres to all medical appointments as scheduled and positively adheres to all daily medication regimens.  Adherence education and counseling provided.     Per History Section:  Past Medical History:   Diagnosis Date    Acute alcoholic hepatitis 2019    Acute liver failure without hepatic coma  6/25/2019    Alcohol use disorder, severe, dependence 6/26/2019    Coagulopathy 6/26/2019    Hepatic encephalopathy 6/26/2019    Hyponatremia 6/26/2019    Thrombocytopenia due to hypersplenism 6/26/2019     Social History     Tobacco Use    Smoking status: Current Every Day Smoker     Packs/day: 1.00     Years: 15.00     Pack years: 15.00     Types: Cigarettes   Substance Use Topics    Alcohol use: Not Currently     Comment: 6/1/19 last drink     Social History     Substance and Sexual Activity   Drug Use Never     Social History     Substance and Sexual Activity   Sexual Activity Yes    Partners: Female    Birth control/protection: None       Per Today's Psychosocial:  Tobacco: Pt's  reports the pt does currently smoke 1ppd.  Pt  reports the pt would like to quit and accepted smoking cessation resources on her behalf. Pt will need to verify desire to discontinue when medically able to do so.   Alcohol: Pt's  reports the pt's last use of ETOH as 6/1/2019.  Pt is a daily drinker consuming one pint of Vodka per day.  Pt has been drinking this quantity for approximately four years.  Pt has drank beer and wine over the course of her lifetime; however, has maintained Vodka for four years.  Pt has completed five or six major treatment programs including inpatient programs twice ([facility] [place]/2weeks in 2007 and  an unknown facility in [place] for 28 days in 2016).  Pt has incurred several relapses.  Pt has also attended AA in the past; however, never consistent.  Pt has not entered into any aftercare programs.  Illicit Drugs/Non-prescribed Medications: none, patient denies any use.    Caregiver states clear understanding of the potential impact of substance use as it relates to transplant candidacy and is aware of possible random substance screening.  Substance abstinence/cessation counseling, education and resources provided and reviewed.     Arrests/DWI/Treatment/Rehab: yes; Pt received  a DWI in 2016, paid a fine and completed community service.     Psychiatric History:    Mental Health: Bipolar-diagnosed at age 7/8.  Pt has also received diagnoses for depression/anxiety (unknown time frame)  Psychiatrist/Counselor: Only received psychiatric treatment while receiving inpatient treatment for alcoholism.    Medications:  None current.  More than six months ago, pt was treated with anti-depressants by her primary care physician, Harsha Larson MD and also while in detox (unknown medications).    Suicide/Homicide Issues: Pt has attempted suicide twice in 2007 and 2015 by overdose.  Pt's  reports the pt has made several suicide threats.    Safety at home: Pt  denies any safety issues within their home (past/present)    Knowledge: Caregiver states having clear understanding and realistic expectations regarding the potential risks and potential benefits of organ transplantation and organ donation, agrees to discuss with health care team members and support system members and to utilize available resources and express questions and/or concerns in order to further facilitate the pt informed decision-making.  Resources and information provided and reviewed.     Caregiver is aware of [facility]'s affiliation and/or partnership with agencies in home health care, LTAC, SNF, [facility], and other hospitals and clinics.    Understanding: Caregiver reports having a clear understanding of the many lifetime commitments involved with being a transplant recipient, including costs, compliance, medications, lab work, procedures, appointments, concrete and financial planning, preparedness, timely and appropriate communication of concerns, abstinence (ETOH, tobacco, illicit non-prescribed drugs), adherence to all health care team recommendations, support system and caregiver involvement, appropriate and timely resource utilization and follow-through, mental health counseling as needed/recommended, and patient and  caregiver responsibilities.  Social Service Handbook, resources and detailed educational information provided and reviewed.  Educational information provided.    Caregiver also reports current and expected compliance with health care regime and states having a clear understanding of the importance of compliance.      Caregiver reports a clear understanding that risks and benefits may be involved with organ transplantation and with organ donation.      Caregiver also reports clear understanding that psychosocial risk factors may affect patient, and include but are not limited to feelings of depression, generalized anxiety, anxiety regarding dependence on others, post traumatic stress disorder, feelings of guilt and other emotional and/or mental concerns, and/or exacerbation of existing mental health concerns.  Detailed resources provided and discussed.     Caregiver agrees to access appropriate resources in a timely manner as needed and/or as recommended, and to communicate concerns appropriately.  Caregiver also reports a clear understanding of treatment options available.      reviewed education, provided additional information, and answered questions.    Feelings or Concerns: Pt's feelings/concerns were not assessed.  Pt's  verbalized feeling hopeless as the pt was initially set to be admitted into hospice.      Coping: Pt's coping was not assessed.  Pt's  verbalized the pt's coping mechanisms are only alcoholism; however, endorsed family support.     Goals: Pt's goals were not assessed.  Patient referred to Vocational Rehabilitation.    Interview Behavior: Patient presents as asleep and unengaged.  Pt's  presented alert/oriented, engaged and communicative.          Transplant Social Work - Candidacy  Assessment/Plan:     Psychosocial Suitability: Patient presents as an unacceptable candidate for liver transplant at this time. Based on psychosocial risk factors, patient presents  as high risk, due to: less than one month of sobriety; completion of two inpatient alcoholism treatment programs with re-instatement (relapse) occurrences each time; unsuccessful attendance in several outpatient treatment programs without after care services incurring relapses each time; current smoker, unmanaged mental health history with several suicide attempts/threats and no identified secondary caregiver.    Recommendations/Additional Comments: Patient would need to enter into an inpatient substance abuse treatment facility and enroll into after care services in an effort to maintain sobriety prior to transplant should pt's health allow; identify a secondary caregiver; utilize smoking cessation resources; receive a mental health assessment for possible psychiatric treatment and medication management w/[facility] while inpatient and comply with all recommendations; serial PETH tests; fundraising and local lodging    Mallory Gonzales LMSW

## 2019-06-27 NOTE — ASSESSMENT & PLAN NOTE
38 year old female with a history of alcoholic cirrhosis on who hepatology is being consulted for decompensated cirrhosis. Patient with a MELD of 35 therefore apart from recommendations below will ask for approval for inpatient liver transplant evaluation.    Recommendations:  --Daily CMP, CBC, and INR  --Obtain infectious workup   --Continue cipro  --Continue lactulose, rifaximin  --Continue folic acid, thiamine  --Continue midodrine  --Addiction psych

## 2019-06-27 NOTE — SUBJECTIVE & OBJECTIVE
Review of Systems   Constitutional: Positive for fatigue. Negative for activity change, appetite change, chills, diaphoresis, fever and unexpected weight change.   HENT: Negative for trouble swallowing and voice change.    Eyes: Negative for photophobia, pain, discharge, redness, itching and visual disturbance.   Respiratory: Negative for cough and shortness of breath.    Cardiovascular: Negative for chest pain, palpitations and leg swelling.   Gastrointestinal: Positive for abdominal distention. Negative for abdominal pain, anal bleeding, blood in stool, constipation, diarrhea, nausea, rectal pain and vomiting.   Endocrine: Negative for cold intolerance, heat intolerance, polydipsia, polyphagia and polyuria.   Genitourinary: Negative for dysuria, frequency, hematuria and urgency.   Musculoskeletal: Negative for back pain and neck pain.   Skin: Negative for color change, pallor, rash and wound.   Neurological: Negative for dizziness, syncope, weakness and light-headedness.       Past Medical History:   Diagnosis Date    Acute alcoholic hepatitis 2019    Acute liver failure without hepatic coma 2019    Alcohol use disorder, severe, dependence 2019    Coagulopathy 2019    Hepatic encephalopathy 2019    Hyponatremia 2019    Thrombocytopenia due to hypersplenism 2019       Past Surgical History:   Procedure Laterality Date     SECTION      CHOLECYSTECTOMY         Family history of liver disease: No    Review of patient's allergies indicates:   Allergen Reactions    Zofran [ondansetron hcl (pf)]      headache       Tobacco Use    Smoking status: Current Every Day Smoker     Packs/day: 1.00     Years: 15.00     Pack years: 15.00     Types: Cigarettes   Substance and Sexual Activity    Alcohol use: Not Currently     Comment: 19 last drink    Drug use: Never    Sexual activity: Yes     Partners: Female     Birth control/protection: None       Medications Prior to  Admission   Medication Sig Dispense Refill Last Dose    magnesium citrate solution Take 296 mLs by mouth once.       multivitamin (THERAGRAN) per tablet Take 1 tablet by mouth once daily.       omeprazole (PRILOSEC) 20 MG capsule Take 20 mg by mouth once daily.   Past Week at Unknown time    spironolactone (ALDACTONE) 100 MG tablet Take 100 mg by mouth once daily.          Objective:     Vital Signs (Most Recent):  Temp: 97 °F (36.1 °C) (06/26/19 1516)  Pulse: 80 (06/26/19 1605)  Resp: 18 (06/26/19 1516)  BP: (!) 105/55 (06/26/19 1516)  SpO2: 98 % (06/26/19 1516) Vital Signs (24h Range):  Temp:  [96.6 °F (35.9 °C)-97 °F (36.1 °C)] 97 °F (36.1 °C)  Pulse:  [80-86] 80  Resp:  [18-20] 18  SpO2:  [95 %-98 %] 98 %  BP: ()/(53-59) 105/55     Weight: 61.6 kg (135 lb 12.9 oz) (06/26/19 0638)  Body mass index is 23.31 kg/m².    Physical Exam   Constitutional: She is oriented to person, place, and time. No distress.   HENT:   Head: Normocephalic and atraumatic.   Eyes: Scleral icterus is present.   Cardiovascular: Normal rate and regular rhythm.   Pulmonary/Chest: Effort normal and breath sounds normal.   Abdominal: Soft. Bowel sounds are normal. She exhibits distension. She exhibits no mass. There is no tenderness. There is no rebound and no guarding. No hernia.   Musculoskeletal: She exhibits no edema.   Neurological: She is alert and oriented to person, place, and time.   Skin: She is not diaphoretic.       MELD-Na score: 35 at 6/26/2019  8:02 AM  MELD score: 31 at 6/26/2019  8:02 AM  Calculated from:  Serum Creatinine: 1.3 mg/dL at 6/26/2019  8:02 AM  Serum Sodium: 118 mmol/L (Rounded to 125 mmol/L) at 6/26/2019  8:02 AM  Total Bilirubin: 28.4 mg/dL at 6/26/2019  8:02 AM  INR(ratio): 2.4 at 6/26/2019  8:02 AM  Age: 38 years    Significant Labs:  CBC:   Recent Labs   Lab 06/26/19  0802   WBC 23.39*   RBC 2.98*   HGB 10.4*   HCT 30.3*   PLT 54*     CMP:   Recent Labs   Lab 06/26/19  0802   GLU 72   CALCIUM 9.7    ALBUMIN 2.0*   PROT 5.8*   *   K 4.0   CO2 18*   CL 88*   BUN 64*   CREATININE 1.3   ALKPHOS 345*   ALT 15   AST 96*   BILITOT 28.4*     Coagulation:   Recent Labs   Lab 06/26/19  0802   INR 2.4*       Significant Imaging:  X-Ray: Reviewed  US: Reviewed

## 2019-06-27 NOTE — HPI
"38 year old female with a history of alcoholic cirrhosis on who hepatology is being consulted for decompensated cirrhosis.    Per HPI:  "Presented to [facility] on 06/26/2019 as a transfer from  [facility] in [place], [place], for liver transplantation evaluation as well as hepatology evaluation. Patient was admitted to OSH 6/6 with abdominal pain, nausea, decreased oral intake, and visibly jaundice.  Over past 2.5 weeks there , pt has been diagnosed with and treated for SBP.  She has been continued on diuretics, but ascites has been resistant to diuretics , requiring multiple paracenteses (last LVP several days prior to transfer).  Most recent paracentesis did not meet criteria for SBP, and had no PMNs, but pt remained on Rocephin for leukocytosis of unclear source.  She has been encephalopathic but slowly improving with increased doses of Lactulose and Rifaxamin.  Pt has not improved with current treatment, thus requesting liver transplant evaluation, Case d/w Dr. Posadas, patient transferred to [facility]. MELD 35 upon presentation at [facility]. Last ETOH drink on 6/3     Upon presentation at [facility], patient was afebrile with systolic blood pressures in the 90s satting 95% on room air.  She was mildly encephalopathic, however was able to participate in conversation,  at bedside.  Meld 35 on presentation.  Labs notable for total bilirubin of 28, sodium of 118 (patient has been getting in diuretics at the outside hospital), INR of 2.4.  WBC of 23, platelets 54.      Upon further questioning regarding patient's disease process, she stated that she has had history of heavy alcohol use, most recently 1-2 pt of vodka per day.  Patient's last alcoholic drink was on 06/03/2019, approximately.  Patient did not state that she has been binge drinking, as noted above 2 pt of vodka per day, prior to her admission at outside hospital.  Patient also uses tobacco.  She stated that she was 1st diagnosed with alcoholic " "cirrhosis and alcoholic hepatitis in August 2018 and was advised to stop drinking at that time.  Patient has had multiple relapses since then.  It appears the patient has failed multiple alcohol rehabs in the past.  In the last couple of months patient has had recurrent problem with worsening ascites and abdominal distention."    Interval History:  Patient seen with  at bedside. Reports he last drink was on 6/1.  She has been to rehab multiple times but has always relapse.  Feels like this time will be different as she will follow instructions and has 2 kids to live for.  "

## 2019-06-27 NOTE — NURSING
Blood glucose 60, low b/p reported to [facility] : new orders for NS@ 125, D10 per protocol and another extra dose of Midodrine 5 mg po : pt asymptomatic , alert , skin w/d.

## 2019-06-28 ENCOUNTER — ANESTHESIA (OUTPATIENT)
Dept: ENDOSCOPY | Facility: HOSPITAL | Age: 39
End: 2019-06-28

## 2019-06-28 ENCOUNTER — ANESTHESIA EVENT (OUTPATIENT)
Dept: ENDOSCOPY | Facility: HOSPITAL | Age: 39
End: 2019-06-28

## 2019-06-28 LAB
ALBUMIN SERPL BCP-MCNC: 2.6 G/DL (ref 3.5–5.2)
ALP SERPL-CCNC: 245 U/L (ref 55–135)
ALT SERPL W/O P-5'-P-CCNC: 12 U/L (ref 10–44)
ANION GAP SERPL CALC-SCNC: 12 MMOL/L (ref 8–16)
ANION GAP SERPL CALC-SCNC: 13 MMOL/L (ref 8–16)
ANION GAP SERPL CALC-SCNC: 14 MMOL/L (ref 8–16)
ANION GAP SERPL CALC-SCNC: 14 MMOL/L (ref 8–16)
AST SERPL-CCNC: 88 U/L (ref 10–40)
BASOPHILS # BLD AUTO: 0.07 K/UL (ref 0–0.2)
BASOPHILS # BLD AUTO: 0.08 K/UL (ref 0–0.2)
BASOPHILS # BLD AUTO: 0.08 K/UL (ref 0–0.2)
BASOPHILS NFR BLD: 0.3 % (ref 0–1.9)
BASOPHILS NFR BLD: 0.4 % (ref 0–1.9)
BASOPHILS NFR BLD: 0.4 % (ref 0–1.9)
BILIRUB SERPL-MCNC: 24.9 MG/DL (ref 0.1–1)
BILIRUB UR QL STRIP: ABNORMAL
BUN SERPL-MCNC: 72 MG/DL (ref 6–20)
BUN SERPL-MCNC: 74 MG/DL (ref 6–20)
CALCIUM SERPL-MCNC: 8.9 MG/DL (ref 8.7–10.5)
CALCIUM SERPL-MCNC: 8.9 MG/DL (ref 8.7–10.5)
CALCIUM SERPL-MCNC: 9.3 MG/DL (ref 8.7–10.5)
CALCIUM SERPL-MCNC: 9.4 MG/DL (ref 8.7–10.5)
CHLORIDE SERPL-SCNC: 87 MMOL/L (ref 95–110)
CHLORIDE SERPL-SCNC: 88 MMOL/L (ref 95–110)
CHLORIDE SERPL-SCNC: 89 MMOL/L (ref 95–110)
CHLORIDE SERPL-SCNC: 89 MMOL/L (ref 95–110)
CLARITY UR REFRACT.AUTO: ABNORMAL
CMV IGG SERPL QL IA: NORMAL
CO2 SERPL-SCNC: 16 MMOL/L (ref 23–29)
CO2 SERPL-SCNC: 16 MMOL/L (ref 23–29)
CO2 SERPL-SCNC: 17 MMOL/L (ref 23–29)
CO2 SERPL-SCNC: 18 MMOL/L (ref 23–29)
COLOR UR AUTO: ABNORMAL
CORTIS SERPL-MCNC: 6.5 UG/DL
CREAT SERPL-MCNC: 2.2 MG/DL (ref 0.5–1.4)
CREAT SERPL-MCNC: 2.2 MG/DL (ref 0.5–1.4)
CREAT SERPL-MCNC: 2.5 MG/DL (ref 0.5–1.4)
CREAT SERPL-MCNC: 2.9 MG/DL (ref 0.5–1.4)
DIFFERENTIAL METHOD: ABNORMAL
EOSINOPHIL # BLD AUTO: 0.5 K/UL (ref 0–0.5)
EOSINOPHIL NFR BLD: 2.3 % (ref 0–8)
EOSINOPHIL NFR BLD: 2.6 % (ref 0–8)
EOSINOPHIL NFR BLD: 2.7 % (ref 0–8)
ERYTHROCYTE [DISTWIDTH] IN BLOOD BY AUTOMATED COUNT: 16.9 % (ref 11.5–14.5)
EST. GFR  (AFRICAN AMERICAN): 22.8 ML/MIN/1.73 M^2
EST. GFR  (AFRICAN AMERICAN): 27.3 ML/MIN/1.73 M^2
EST. GFR  (AFRICAN AMERICAN): 31.8 ML/MIN/1.73 M^2
EST. GFR  (AFRICAN AMERICAN): 31.8 ML/MIN/1.73 M^2
EST. GFR  (NON AFRICAN AMERICAN): 19.8 ML/MIN/1.73 M^2
EST. GFR  (NON AFRICAN AMERICAN): 23.7 ML/MIN/1.73 M^2
EST. GFR  (NON AFRICAN AMERICAN): 27.6 ML/MIN/1.73 M^2
EST. GFR  (NON AFRICAN AMERICAN): 27.6 ML/MIN/1.73 M^2
FIBRINOGEN PPP-MCNC: 239 MG/DL (ref 182–366)
FIBRINOGEN PPP-MCNC: 247 MG/DL (ref 182–366)
GLUCOSE SERPL-MCNC: 109 MG/DL (ref 70–110)
GLUCOSE SERPL-MCNC: 85 MG/DL (ref 70–110)
GLUCOSE SERPL-MCNC: 93 MG/DL (ref 70–110)
GLUCOSE SERPL-MCNC: 93 MG/DL (ref 70–110)
GLUCOSE UR QL STRIP: NEGATIVE
HAPTOGLOB SERPL-MCNC: 46 MG/DL (ref 30–250)
HCT VFR BLD AUTO: 21.8 % (ref 37–48.5)
HCT VFR BLD AUTO: 23.3 % (ref 37–48.5)
HCT VFR BLD AUTO: 23.6 % (ref 37–48.5)
HGB BLD-MCNC: 8 G/DL (ref 12–16)
HGB BLD-MCNC: 8.5 G/DL (ref 12–16)
HGB BLD-MCNC: 8.5 G/DL (ref 12–16)
HGB UR QL STRIP: ABNORMAL
HYALINE CASTS UR QL AUTO: 5 /LPF
IMM GRANULOCYTES # BLD AUTO: 0.15 K/UL (ref 0–0.04)
IMM GRANULOCYTES # BLD AUTO: 0.16 K/UL (ref 0–0.04)
IMM GRANULOCYTES # BLD AUTO: 0.2 K/UL (ref 0–0.04)
IMM GRANULOCYTES NFR BLD AUTO: 0.8 % (ref 0–0.5)
IMM GRANULOCYTES NFR BLD AUTO: 0.8 % (ref 0–0.5)
IMM GRANULOCYTES NFR BLD AUTO: 1 % (ref 0–0.5)
INR PPP: 2.4 (ref 0.8–1.2)
KETONES UR QL STRIP: NEGATIVE
LDH SERPL L TO P-CCNC: 224 U/L (ref 110–260)
LEUKOCYTE ESTERASE UR QL STRIP: NEGATIVE
LYMPHOCYTES # BLD AUTO: 1.2 K/UL (ref 1–4.8)
LYMPHOCYTES # BLD AUTO: 1.3 K/UL (ref 1–4.8)
LYMPHOCYTES # BLD AUTO: 1.6 K/UL (ref 1–4.8)
LYMPHOCYTES NFR BLD: 5.9 % (ref 18–48)
LYMPHOCYTES NFR BLD: 6.5 % (ref 18–48)
LYMPHOCYTES NFR BLD: 8.2 % (ref 18–48)
MAGNESIUM SERPL-MCNC: 2.1 MG/DL (ref 1.6–2.6)
MCH RBC QN AUTO: 35.2 PG (ref 27–31)
MCH RBC QN AUTO: 35.9 PG (ref 27–31)
MCH RBC QN AUTO: 36 PG (ref 27–31)
MCHC RBC AUTO-ENTMCNC: 36 G/DL (ref 32–36)
MCHC RBC AUTO-ENTMCNC: 36.5 G/DL (ref 32–36)
MCHC RBC AUTO-ENTMCNC: 36.7 G/DL (ref 32–36)
MCV RBC AUTO: 100 FL (ref 82–98)
MCV RBC AUTO: 96 FL (ref 82–98)
MCV RBC AUTO: 99 FL (ref 82–98)
MICROSCOPIC COMMENT: ABNORMAL
MONOCYTES # BLD AUTO: 0.9 K/UL (ref 0.3–1)
MONOCYTES # BLD AUTO: 0.9 K/UL (ref 0.3–1)
MONOCYTES # BLD AUTO: 1 K/UL (ref 0.3–1)
MONOCYTES NFR BLD: 4.3 % (ref 4–15)
MONOCYTES NFR BLD: 4.7 % (ref 4–15)
MONOCYTES NFR BLD: 4.9 % (ref 4–15)
NEUTROPHILS # BLD AUTO: 16.2 K/UL (ref 1.8–7.7)
NEUTROPHILS # BLD AUTO: 17 K/UL (ref 1.8–7.7)
NEUTROPHILS # BLD AUTO: 17.6 K/UL (ref 1.8–7.7)
NEUTROPHILS NFR BLD: 83.2 % (ref 38–73)
NEUTROPHILS NFR BLD: 84.9 % (ref 38–73)
NEUTROPHILS NFR BLD: 86.1 % (ref 38–73)
NITRITE UR QL STRIP: NEGATIVE
NRBC BLD-RTO: 0 /100 WBC
PH UR STRIP: 5 [PH] (ref 5–8)
PHOSPHATE SERPL-MCNC: 6.3 MG/DL (ref 2.7–4.5)
PLATELET # BLD AUTO: 51 K/UL (ref 150–350)
PLATELET # BLD AUTO: 54 K/UL (ref 150–350)
PLATELET # BLD AUTO: 54 K/UL (ref 150–350)
PMV BLD AUTO: 12.5 FL (ref 9.2–12.9)
PMV BLD AUTO: 13.8 FL (ref 9.2–12.9)
PMV BLD AUTO: ABNORMAL FL (ref 9.2–12.9)
POCT GLUCOSE: 100 MG/DL (ref 70–110)
POCT GLUCOSE: 90 MG/DL (ref 70–110)
POTASSIUM SERPL-SCNC: 4 MMOL/L (ref 3.5–5.1)
POTASSIUM SERPL-SCNC: 4.2 MMOL/L (ref 3.5–5.1)
PROT SERPL-MCNC: 5 G/DL (ref 6–8.4)
PROT UR QL STRIP: NEGATIVE
PROTHROMBIN TIME: 22.8 SEC (ref 9–12.5)
RBC # BLD AUTO: 2.27 M/UL (ref 4–5.4)
RBC # BLD AUTO: 2.36 M/UL (ref 4–5.4)
RBC # BLD AUTO: 2.37 M/UL (ref 4–5.4)
RBC #/AREA URNS AUTO: 6 /HPF (ref 0–4)
SMOOTH MUSCLE AB TITR SER IF: NORMAL {TITER}
SODIUM SERPL-SCNC: 117 MMOL/L (ref 136–145)
SODIUM SERPL-SCNC: 118 MMOL/L (ref 136–145)
SODIUM SERPL-SCNC: 119 MMOL/L (ref 136–145)
SODIUM SERPL-SCNC: 119 MMOL/L (ref 136–145)
SP GR UR STRIP: 1.01 (ref 1–1.03)
SQUAMOUS #/AREA URNS AUTO: 2 /HPF
STRONGYLOIDES ANTIBODY IGG: POSITIVE
URN SPEC COLLECT METH UR: ABNORMAL
WBC # BLD AUTO: 19.46 K/UL (ref 3.9–12.7)
WBC # BLD AUTO: 20.05 K/UL (ref 3.9–12.7)
WBC # BLD AUTO: 20.38 K/UL (ref 3.9–12.7)
WBC #/AREA URNS AUTO: 1 /HPF (ref 0–5)

## 2019-06-28 PROCEDURE — 36415 COLL VENOUS BLD VENIPUNCTURE: CPT

## 2019-06-28 PROCEDURE — 83735 ASSAY OF MAGNESIUM: CPT

## 2019-06-28 PROCEDURE — 85384 FIBRINOGEN ACTIVITY: CPT | Mod: 91

## 2019-06-28 PROCEDURE — 63600175 PHARM REV CODE 636 W HCPCS: Performed by: HOSPITALIST

## 2019-06-28 PROCEDURE — S4991 NICOTINE PATCH NONLEGEND: HCPCS | Performed by: HOSPITALIST

## 2019-06-28 PROCEDURE — 97165 OT EVAL LOW COMPLEX 30 MIN: CPT

## 2019-06-28 PROCEDURE — 80053 COMPREHEN METABOLIC PANEL: CPT

## 2019-06-28 PROCEDURE — 82533 TOTAL CORTISOL: CPT | Mod: 91

## 2019-06-28 PROCEDURE — 97116 GAIT TRAINING THERAPY: CPT

## 2019-06-28 PROCEDURE — 99223 1ST HOSP IP/OBS HIGH 75: CPT | Mod: ,,, | Performed by: INTERNAL MEDICINE

## 2019-06-28 PROCEDURE — 97161 PT EVAL LOW COMPLEX 20 MIN: CPT

## 2019-06-28 PROCEDURE — 81001 URINALYSIS AUTO W/SCOPE: CPT

## 2019-06-28 PROCEDURE — 85610 PROTHROMBIN TIME: CPT

## 2019-06-28 PROCEDURE — 80048 BASIC METABOLIC PNL TOTAL CA: CPT

## 2019-06-28 PROCEDURE — 99231 PR SUBSEQUENT HOSPITAL CARE,LEVL I: ICD-10-PCS | Mod: ,,, | Performed by: INTERNAL MEDICINE

## 2019-06-28 PROCEDURE — 20600001 HC STEP DOWN PRIVATE ROOM

## 2019-06-28 PROCEDURE — 85384 FIBRINOGEN ACTIVITY: CPT

## 2019-06-28 PROCEDURE — 82024 ASSAY OF ACTH: CPT

## 2019-06-28 PROCEDURE — 99231 SBSQ HOSP IP/OBS SF/LOW 25: CPT | Mod: ,,, | Performed by: INTERNAL MEDICINE

## 2019-06-28 PROCEDURE — 25000003 PHARM REV CODE 250: Performed by: HOSPITALIST

## 2019-06-28 PROCEDURE — P9047 ALBUMIN (HUMAN), 25%, 50ML: HCPCS | Mod: JG | Performed by: HOSPITALIST

## 2019-06-28 PROCEDURE — 83010 ASSAY OF HAPTOGLOBIN QUANT: CPT

## 2019-06-28 PROCEDURE — 97530 THERAPEUTIC ACTIVITIES: CPT

## 2019-06-28 PROCEDURE — 85025 COMPLETE CBC W/AUTO DIFF WBC: CPT | Mod: 91

## 2019-06-28 PROCEDURE — 99233 PR SUBSEQUENT HOSPITAL CARE,LEVL III: ICD-10-PCS | Mod: ,,, | Performed by: HOSPITALIST

## 2019-06-28 PROCEDURE — 83615 LACTATE (LD) (LDH) ENZYME: CPT

## 2019-06-28 PROCEDURE — 99233 SBSQ HOSP IP/OBS HIGH 50: CPT | Mod: ,,, | Performed by: HOSPITALIST

## 2019-06-28 PROCEDURE — 84100 ASSAY OF PHOSPHORUS: CPT

## 2019-06-28 PROCEDURE — 80048 BASIC METABOLIC PNL TOTAL CA: CPT | Mod: 91

## 2019-06-28 PROCEDURE — 99223 PR INITIAL HOSPITAL CARE,LEVL III: ICD-10-PCS | Mod: ,,, | Performed by: INTERNAL MEDICINE

## 2019-06-28 RX ORDER — ALBUMIN HUMAN 250 G/1000ML
25 SOLUTION INTRAVENOUS 2 TIMES DAILY
Status: DISCONTINUED | OUTPATIENT
Start: 2019-06-28 | End: 2019-06-30

## 2019-06-28 RX ORDER — IBUPROFEN 200 MG
1 TABLET ORAL DAILY
Status: DISCONTINUED | OUTPATIENT
Start: 2019-06-28 | End: 2019-07-05 | Stop reason: HOSPADM

## 2019-06-28 RX ORDER — COSYNTROPIN 0.25 MG/ML
0.25 INJECTION, POWDER, FOR SOLUTION INTRAMUSCULAR; INTRAVENOUS ONCE
Status: COMPLETED | OUTPATIENT
Start: 2019-06-28 | End: 2019-06-28

## 2019-06-28 RX ORDER — PANTOPRAZOLE SODIUM 40 MG/1
40 TABLET, DELAYED RELEASE ORAL
Status: DISCONTINUED | OUTPATIENT
Start: 2019-06-28 | End: 2019-07-05 | Stop reason: HOSPADM

## 2019-06-28 RX ORDER — CIPROFLOXACIN 500 MG/1
500 TABLET ORAL EVERY 24 HOURS
Status: DISCONTINUED | OUTPATIENT
Start: 2019-06-29 | End: 2019-06-29

## 2019-06-28 RX ORDER — OCTREOTIDE ACETATE 100 UG/ML
100 INJECTION, SOLUTION INTRAVENOUS; SUBCUTANEOUS EVERY 8 HOURS
Status: DISCONTINUED | OUTPATIENT
Start: 2019-06-28 | End: 2019-07-03

## 2019-06-28 RX ADMIN — PROMETHAZINE HYDROCHLORIDE 12.5 MG: 12.5 TABLET ORAL at 08:06

## 2019-06-28 RX ADMIN — PHYTONADIONE 10 MG: 10 INJECTION, EMULSION INTRAMUSCULAR; INTRAVENOUS; SUBCUTANEOUS at 09:06

## 2019-06-28 RX ADMIN — LACTULOSE 45 G: 20 SOLUTION ORAL at 09:06

## 2019-06-28 RX ADMIN — NICOTINE 1 PATCH: 21 PATCH, EXTENDED RELEASE TRANSDERMAL at 11:06

## 2019-06-28 RX ADMIN — LACTULOSE 45 G: 20 SOLUTION ORAL at 03:06

## 2019-06-28 RX ADMIN — RIFAXIMIN 550 MG: 550 TABLET ORAL at 09:06

## 2019-06-28 RX ADMIN — COSYNTROPIN 0.25 MG: 0.25 INJECTION, POWDER, LYOPHILIZED, FOR SOLUTION INTRAVENOUS at 09:06

## 2019-06-28 RX ADMIN — OCTREOTIDE ACETATE 100 MCG: 100 INJECTION, SOLUTION INTRAVENOUS; SUBCUTANEOUS at 09:06

## 2019-06-28 RX ADMIN — MIDODRINE HYDROCHLORIDE 15 MG: 5 TABLET ORAL at 09:06

## 2019-06-28 RX ADMIN — PANTOPRAZOLE SODIUM 40 MG: 40 TABLET, DELAYED RELEASE ORAL at 03:06

## 2019-06-28 RX ADMIN — Medication 100 MG: at 09:06

## 2019-06-28 RX ADMIN — CEFTRIAXONE 2 G: 2 INJECTION, SOLUTION INTRAVENOUS at 09:06

## 2019-06-28 RX ADMIN — TRAMADOL HYDROCHLORIDE 50 MG: 50 TABLET, FILM COATED ORAL at 08:06

## 2019-06-28 RX ADMIN — OCTREOTIDE ACETATE 50 MCG/HR: 1000 INJECTION, SOLUTION INTRAVENOUS; SUBCUTANEOUS at 05:06

## 2019-06-28 RX ADMIN — PROMETHAZINE HYDROCHLORIDE 12.5 MG: 12.5 TABLET ORAL at 11:06

## 2019-06-28 RX ADMIN — MIDODRINE HYDROCHLORIDE 15 MG: 5 TABLET ORAL at 03:06

## 2019-06-28 RX ADMIN — FOLIC ACID 1 MG: 1 TABLET ORAL at 09:06

## 2019-06-28 RX ADMIN — ALBUMIN (HUMAN) 25 G: 25 SOLUTION INTRAVENOUS at 09:06

## 2019-06-28 RX ADMIN — OCTREOTIDE ACETATE 100 MCG: 100 INJECTION, SOLUTION INTRAVENOUS; SUBCUTANEOUS at 03:06

## 2019-06-28 RX ADMIN — TRAMADOL HYDROCHLORIDE 50 MG: 50 TABLET, FILM COATED ORAL at 11:06

## 2019-06-28 NOTE — PLAN OF CARE
Problem: Adult Inpatient Plan of Care  Goal: Plan of Care Review  Outcome: Ongoing (interventions implemented as appropriate)  Patient continues to be hypotensive but MAP >65 throughout the night. Patient reports continuous nausea regardless of PRN medication administration. Patient also reports pain unrelieved by PRN analgesic. No other acute events or changes overnight.     Problem: Fall Injury Risk  Goal: Absence of Fall and Fall-Related Injury  Outcome: Ongoing (interventions implemented as appropriate)  Patient remains free from falls.

## 2019-06-28 NOTE — CARE UPDATE
Rapid Response Nurse Follow-up Note     Followed up with patient for proactive rounding.   No acute issues at this time. Reviewed plan of care with primary RN, Doreen.   Please call Rapid Response RN, Jessy Valenzuela RN with any questions or concerns at [phone].

## 2019-06-28 NOTE — PT/OT/SLP EVAL
"Physical Therapy Evaluation    Patient Name:  Destinee Gallagher   MRN:  [id number]    Recommendations:     Discharge Recommendations:  home health PT   Discharge Equipment Recommendations: none   Barriers to discharge: Inaccessible home     Assessment:     Destinee Gallagher is a 38 y.o. female admitted with a medical diagnosis of Acute liver failure without hepatic coma.  She presents with the following impairments/functional limitations:  weakness, gait instability, impaired endurance, impaired balance, impaired functional mobilty, decreased safety awareness. Pt agreeable to therapy evaluation and tolerated ambulation well with minimal to no SOB and slight gait instability. Pt safe to ambulate with assistance from  or nursing staff. Pt would benefit from skilled acute PT services 2x/week and further PT upon discharge when medically stable to optimize independence with functional mobility.     Rehab Prognosis: Good; patient would benefit from acute skilled PT services to address these deficits and reach maximum level of function.    Recent Surgery: Procedure(s) (LRB):  EGD (ESOPHAGOGASTRODUODENOSCOPY) (N/A)      Plan:     During this hospitalization, patient to be seen 2 x/week to address the identified rehab impairments via gait training, therapeutic activities, therapeutic exercises, neuromuscular re-education and progress toward the following goals:    · Plan of Care Expires:  07/28/19    Subjective     Chief Complaint: "back of my legs are sore"   Patient/Family Comments/goals: To go home   Pain/Comfort:  · Pain Rating 1: 0/10    Patients cultural, spiritual, Anabaptist conflicts given the current situation: no    Living Environment:  Pt lives with  and 2 children (ages 13, 15) in 2  with threshold to enter. Pt's living quarters are on the 2nd story with no bedrooms downstairs. According to , they are looking to move to [facility].  Prior to admission, patients level of function was ambulatory without AD, " however per  pt has declined in functional mobility to limited ambulation and requiring his assistance since June 2018 and (B) plantar fasciitis injury.  Equipment used at home: none.  Upon discharge, patient will have assistance from , who works but may be able to take leave of absence.    Objective:     Communicated with RN prior to session.  Patient found supine with peripheral IV, telemetry  upon PT entry to room.    General Precautions: Standard, fall   Orthopedic Precautions:N/A   Braces: N/A     Exams:  · Cognitive Exam:  Patient is oriented to Person, Place and Time  · RLE ROM: WFL  · RLE Strength: WFL  · LLE ROM: WFL  · LLE Strength: WFL    Functional Mobility:  · Bed Mobility:    · Rolling Right: contact guard assistance  · Scooting: stand by assistance  · Supine to Sit: minimum assistance    · Transfers:    · Sit to Stand:  stand by assistance with no AD  · Toilet Transfer: supervision with  no AD  using  Step Transfer    · Gait: Ambulated x260' with no AD and CGA due to wide KEYUR and slight instability. Pt reports the back of her legs are sore. Pt took x1 standing rest break and had minimal SOB.     · Balance: Supervision sitting EOB, SBA static standing balance       Therapeutic Activities and Exercises:   - Pt educated on PT role, POC, benefits of OOB activity and continued mobility   - Pt advised to continue safe mobility with the assistance from  or nursing staff only     AM-PAC 6 CLICK MOBILITY  Total Score:18     Patient left up in chair with all lines intact, call button in reach, RN notified and  present.    GOALS:   Multidisciplinary Problems     Physical Therapy Goals        Problem: Physical Therapy Goal    Goal Priority Disciplines Outcome Goal Variances Interventions   Physical Therapy Goal     PT, PT/OT Ongoing (interventions implemented as appropriate)     Description:  Goals to be met by: 7/12/2019    Patient will increase functional independence with mobility  by performin. Supine to sit with Modified [place]  2. Bed to chair transfer with Modified [place] using LRAD  3. Gait  x 300 feet with Supervision using no AD or LRAD.   4. Ascend/descend 13 stairs with bilateral Handrails Stand-by Assistance.   5. Lower extremity exercise program x20 reps per handout, with independence                      History:     Past Medical History:   Diagnosis Date    Acute alcoholic hepatitis 2019    Acute liver failure without hepatic coma 2019    Alcohol use disorder, severe, dependence 2019    Coagulopathy 2019    Hepatic encephalopathy 2019    Hyponatremia 2019    Thrombocytopenia due to hypersplenism 2019       Past Surgical History:   Procedure Laterality Date     SECTION      CHOLECYSTECTOMY         Time Tracking:     PT Received On: 19  PT Start Time: 915     PT Stop Time: 943  PT Total Time (min): 28 min     Billable Minutes: Evaluation 18 and Gait Training 10      DOMO Gracia  2019

## 2019-06-28 NOTE — PROGRESS NOTES
"[facility]  Hepatology  Progress Note    Patient Name: Destinee Gallagher  MRN: [id number]  Admission Date: 6/26/2019  Hospital Length of Stay: 2 days  Attending Provider: Marisa Banks MD   Primary Care Physician: Harsha Larson  Principal Problem:Acute liver failure without hepatic coma    Subjective:     HPI: 38 year old female with a history of alcoholic cirrhosis on who hepatology is being consulted for decompensated cirrhosis.    Per HPI:  "Presented to [facility] on 06/26/2019 as a transfer from  [facility] in [place], [place], for liver transplantation evaluation as well as hepatology evaluation. Patient was admitted to OSH 6/6 with abdominal pain, nausea, decreased oral intake, and visibly jaundice.  Over past 2.5 weeks there , pt has been diagnosed with and treated for SBP.  She has been continued on diuretics, but ascites has been resistant to diuretics , requiring multiple paracenteses (last LVP several days prior to transfer).  Most recent paracentesis did not meet criteria for SBP, and had no PMNs, but pt remained on Rocephin for leukocytosis of unclear source.  She has been encephalopathic but slowly improving with increased doses of Lactulose and Rifaxamin.  Pt has not improved with current treatment, thus requesting liver transplant evaluation, Case d/w Dr. Posadas, patient transferred to [facility]. MELD 35 upon presentation at [facility]. Last ETOH drink on 6/3     Upon presentation at [facility], patient was afebrile with systolic blood pressures in the 90s satting 95% on room air.  She was mildly encephalopathic, however was able to participate in conversation,  at bedside.  Meld 35 on presentation.  Labs notable for total bilirubin of 28, sodium of 118 (patient has been getting in diuretics at the outside hospital), INR of 2.4.  WBC of 23, platelets 54.      Upon further questioning regarding patient's disease process, she stated that she has had history of heavy alcohol use, " "most recently 1-2 pt of vodka per day.  Patient's last alcoholic drink was on 06/03/2019, approximately.  Patient did not state that she has been binge drinking, as noted above 2 pt of vodka per day, prior to her admission at outside hospital.  Patient also uses tobacco.  She stated that she was 1st diagnosed with alcoholic cirrhosis and alcoholic hepatitis in August 2018 and was advised to stop drinking at that time.  Patient has had multiple relapses since then.  It appears the patient has failed multiple alcohol rehabs in the past.  In the last couple of months patient has had recurrent problem with worsening ascites and abdominal distention."    Interval History:  Patient seen with  at bedside. Reports he last drink was on 6/1.  She has been to rehab multiple times but has always relapse.  Feels like this time will be different as she will follow instructions and has 2 kids to live for.    Interval History:   Patient seen X 2 today.    Early in the morning patient was quite somnolent.    Then when she was seen later on in the day (with liver attending) she seemed more alert and was up in a chair. Physical exam below is reflective of second visit with patient.    Current Facility-Administered Medications   Medication    acetaminophen tablet 325 mg    albumin human 25% bottle 25 g    [START ON 6/29/2019] ciprofloxacin HCl tablet 500 mg    dextrose 10% (D10W) Bolus    dextrose 10% (D10W) Bolus    folic acid tablet 1 mg    glucagon (human recombinant) injection 1 mg    glucose chewable tablet 16 g    glucose chewable tablet 24 g    lactulose 20 gram/30 mL solution Soln 45 g    lidocaine 5 % patch 2 patch    midodrine tablet 15 mg    octreotide injection 100 mcg    pantoprazole EC tablet 40 mg    polyethylene glycol packet 17 g    promethazine tablet 12.5 mg    ramelteon tablet 8 mg    rifAXIMin tablet 550 mg    sodium chloride 0.9% flush 5 mL    thiamine tablet 100 mg    traMADol tablet " 50 mg       Objective:     Vital Signs (Most Recent):  Temp: 97.4 °F (36.3 °C) (06/28/19 1255)  Pulse: 81 (06/28/19 1255)  Resp: 16 (06/28/19 1255)  BP: (!) 100/57 (06/28/19 1255)  SpO2: 99 % (06/28/19 1255) Vital Signs (24h Range):  Temp:  [97 °F (36.1 °C)-98.4 °F (36.9 °C)] 97.4 °F (36.3 °C)  Pulse:  [79-88] 81  Resp:  [16-18] 16  SpO2:  [94 %-99 %] 99 %  BP: ()/(45-57) 100/57     Weight: 60.3 kg (133 lb) (06/27/19 1415)  Body mass index is 22.13 kg/m².    Physical Exam   Constitutional: She is oriented to person, place, and time. No distress.   HENT:   Head: Normocephalic and atraumatic.   Eyes: Scleral icterus is present.   Cardiovascular: Normal rate and regular rhythm.   Pulmonary/Chest: Effort normal and breath sounds normal.   Abdominal: Soft. Bowel sounds are normal. She exhibits distension. She exhibits no mass. There is no tenderness. There is no rebound and no guarding. No hernia.   Musculoskeletal: She exhibits no edema.   Neurological: She is alert and oriented to person, place, and time.   Skin: She is not diaphoretic.       MELD-Na score: 38 at 6/28/2019  4:25 AM  MELD score: 36 at 6/28/2019  4:25 AM  Calculated from:  Serum Creatinine: 2.2 mg/dL at 6/28/2019  4:24 AM  Serum Sodium: 119 mmol/L (Rounded to 125 mmol/L) at 6/28/2019  4:24 AM  Total Bilirubin: 24.9 mg/dL at 6/28/2019  4:24 AM  INR(ratio): 2.4 at 6/28/2019  4:25 AM  Age: 38 years    Significant Labs:  CBC:   Recent Labs   Lab 06/28/19  0423   WBC 19.46*   RBC 2.37*   HGB 8.5*   HCT 23.6*   PLT 51*     CMP:   Recent Labs   Lab 06/28/19  0424   GLU 93  93   CALCIUM 8.9  8.9   ALBUMIN 2.6*   PROT 5.0*   *  119*   K 4.2  4.2   CO2 16*  16*   CL 89*  89*   BUN 72*  72*   CREATININE 2.2*  2.2*   ALKPHOS 245*   ALT 12   AST 88*   BILITOT 24.9*     Coagulation:   Recent Labs   Lab 06/28/19  0425   INR 2.4*       Significant Imaging:  X-Ray: Reviewed  US: Reviewed    Assessment/Plan:     Alcoholic cirrhosis of liver with  ascites  38 year old female with a history of alcoholic cirrhosis who hepatology is following for decompensated cirrhosis and liver transplant evaluation.     MELD-Na of 38 and although slightly better on our 2nd exam today she can quickly decompensate further. From our perspective would continue antibiotics as well as HRS protocol while moving forward with transplant evaluation.    Recommendations:  --Daily CMP, CBC, and INR  --Strict I/O and avoid nephrotoxins  --F/U infectious workup  --Continue antibiotics  --Continue lactulose, rifaximin  --Continue folic acid, thiamine  --Continue midodrine, octreotide, and albumin  --Low threshold for higher level of care and ICU transfer          Coffee ground emesis  After coffee ground emesis X 2 yesterday afternoon she has not had further episodes, she was scheduled for EGD today however will try to expedite workup and only proceed with EGD in overt bleeding in order to avoid further decompensation with anesthesia. Plan discussed with primary team and GI.    Recommendations:  --Monitor for overt signs of bleeding  --Trend H/H and transfuse as needed  --Protonix 40 mg IV BID  --D/C Octreotide gtt        Thank you for your consult. I will follow-up with patient. Please contact us if you have any additional questions.    Selena Keene M.D.  Gastroenterology Fellow, PGY-V  Pager: [phone]  [facility]

## 2019-06-28 NOTE — PT/OT/SLP EVAL
Occupational Therapy   Evaluation and Discharge Note    Name: Destinee Gallagher  MRN: [id number]  Admitting Diagnosis:  Acute liver failure without hepatic coma      Recommendations:     Discharge Recommendations: home with home health  Discharge Equipment Recommendations:  none  Barriers to discharge:       Assessment:     Destinee Gallagher is a 38 y.o. female with a medical diagnosis of Acute liver failure without hepatic coma. At this time, patient is functioning at their prior level of function and does not require further acute OT services.     Plan:     During this hospitalization, patient does not require further acute OT services.  Please re-consult if situation changes.    · Plan of Care Reviewed with: patient, spouse    Subjective     Occupational Profile:  Living Environment: Pt lives with her  in a 2 story condo but they plan to move into a [facility] eventually.  Previous level of function: Independent but has wavered since liver disease began last June.  Equipment Used at home:  none  Assistance upon Discharge:     Pain/Comfort:  ·      Patients cultural, spiritual, Latter-day conflicts given the current situation:      Objective:     Communicated with: rn prior to session.  Patient found supine with   upon OT entry to room.    General Precautions: Standard,     Orthopedic Precautions:    Braces:       Occupational Performance:    Bed Mobility:    · Independent    Functional Mobility/Transfers:  · Patient completed Sit <> Stand Transfer with supervision  with  no assistive device   · Patient completed Bed <> Chair Transfer using Step Transfer technique with supervision with no assistive device  · Patient completed Toilet Transfer Step Transfer technique with supervision with  no AD  · Functional Mobility: SBA with no AD.    Activities of Daily Living:  · Setup - (I).    Cognitive/Visual Perceptual:  Cognitive/Psychosocial Skills:     -       Oriented to: Person, Place, Time and Situation   -       Safety  awareness/insight to disability: intact     Physical Exam:  BUE AROM/MMT: WNL    AMPAC 6 Click ADL:  AMPAC Total Score: 23    Treatment & Education:  UE ROM/MMT  Bed mobility training / assessment  Functional mobility assessment  Sit/standing balance assessment  Educated on importance of sitting OOB in bedside chair to promote increased strength, endurance & breathing.  Discussed D/C of OT  Education:    Patient left up in chair with call button in reach    GOALS:   Multidisciplinary Problems     Occupational Therapy Goals     Not on file          Multidisciplinary Problems (Resolved)        Problem: Occupational Therapy Goal    Goal Priority Disciplines Outcome Interventions   Occupational Therapy Goal   (Resolved)     OT, PT/OT Outcome(s) achieved                    History:     Past Medical History:   Diagnosis Date    Acute alcoholic hepatitis 2019    Acute liver failure without hepatic coma 2019    Alcohol use disorder, severe, dependence 2019    Coagulopathy 2019    Hepatic encephalopathy 2019    Hyponatremia 2019    Thrombocytopenia due to hypersplenism 2019       Past Surgical History:   Procedure Laterality Date     SECTION      CHOLECYSTECTOMY         Time Tracking:     OT Date of Treatment: 19  OT Start Time: 915  OT Stop Time: 942  OT Total Time (min): 27 min    Billable Minutes:Evaluation 17  Therapeutic Activity 10    DANIA Hernandez  2019

## 2019-06-28 NOTE — PLAN OF CARE
Problem: Occupational Therapy Goal  Goal: Occupational Therapy Goal  Outcome: Outcome(s) achieved Date Met: 06/28/19  Eval and D/C - no needs.    DANIA Hernandez

## 2019-06-28 NOTE — PLAN OF CARE
Problem: Physical Therapy Goal  Goal: Physical Therapy Goal  Goals to be met by: 2019    Patient will increase functional independence with mobility by performin. Supine to sit with Modified [place]  2. Bed to chair transfer with Modified [place] using LRAD  3. Gait  x 300 feet with Supervision using no AD or LRAD.   4. Ascend/descend 13 stairs with bilateral Handrails Stand-by Assistance.   5. Lower extremity exercise program x20 reps per handout, with independence    Outcome: Ongoing (interventions implemented as appropriate)  Evaluation complete. Goals set.

## 2019-06-28 NOTE — PROGRESS NOTES
Rapid Response Nurse Chart Check     Chart check completed, abnormal VS noted, bedside RNDoreen contacted, no concerns verbalized at this time, instructed to call [phone] for further concerns or assistance.           Dementia without behavioral disturbance, unspecified dementia type

## 2019-06-28 NOTE — ASSESSMENT & PLAN NOTE
38 year old female with a history of alcoholic cirrhosis who hepatology is following for decompensated cirrhosis and liver transplant evaluation.     MELD-Na of 38 and although slightly better on our 2nd exam today she can quickly decompensate further. From our perspective would continue antibiotics as well as HRS protocol while moving forward with transplant evaluation.    Recommendations:  --Daily CMP, CBC, and INR  --Strict I/O and avoid nephrotoxins  --F/U infectious workup  --Continue antibiotics  --Continue lactulose, rifaximin  --Continue folic acid, thiamine  --Continue midodrine, octreotide, and albumin  --Low threshold for higher level of care and ICU transfer

## 2019-06-28 NOTE — CONSULTS
[facility]  Gastroenterology  Consult Note    Patient Name: Destinee Gallagher  MRN: [id number]  Admission Date: 6/26/2019  Hospital Length of Stay: 2 days  Code Status: Full Code   Attending Provider: Marisa Banks MD   Consulting Provider: Clint Elizalde MD  Primary Care Physician: Harsha Larson  Principal Problem:Acute liver failure without hepatic coma    Inpatient consult to Gastroenterology  Consult performed by: Clint Elizalde MD  Consult ordered by: Selena Keene MD        Subjective:     HPI:  Ms Gallagher is a 38 year old female with history of ETOH ESLD transferred from [place] for [place] evaluation; GI consulted due to concern for coffee-grind emesis.    Transferred to [facility] on 6/26 due to decompensated ESLD for transplant evaluation. Admitted initially to OSH 6/6 with abdominal pain, nausea, vomiting and jaundice. At OSH treated for SBP and AMS.    In afternoon 6/27 she was noted to have ~2x 200ml episodes of coffee-grind emesis therefore she was started on PPI, octreotide and GI consulted for evaluation. She cannot recall details of her vomitus but per  appeared dark and grainy. Notes her stools have not been bloody or black but appeared more grainy and dark yesterday. She denies nausea or vomiting overnight or currently feeling nauseated but endorses abdominal pain. Denies dysphagia or odynophagia. Endorses reflux.    Denies prior history of GI bleeding. Denies prior history of EGD. Last colonoscopy reportedly normal within 1 year. Denies NSAID with exception of prn aleve prior to admission.    On admission labs notable for Hgb 10.4 which declined to 9.3 on 6/27 and overnight declined and stabilized ~8.5. Plt 51, INR 2.4, fibrinogen 247. Not on any anticoagulants or antiplatelet agents.      Past Medical History:   Diagnosis Date    Acute alcoholic hepatitis 6/26/2019    Acute liver failure without hepatic coma 6/25/2019    Alcohol use disorder, severe, dependence 6/26/2019     Coagulopathy 2019    Hepatic encephalopathy 2019    Hyponatremia 2019    Thrombocytopenia due to hypersplenism 2019       Past Surgical History:   Procedure Laterality Date     SECTION      CHOLECYSTECTOMY         Review of patient's allergies indicates:   Allergen Reactions    Zofran [ondansetron hcl (pf)]      headache     Family History     None        Tobacco Use    Smoking status: Current Every Day Smoker     Packs/day: 1.00     Years: 15.00     Pack years: 15.00     Types: Cigarettes   Substance and Sexual Activity    Alcohol use: Not Currently     Comment: 19 last drink    Drug use: Never    Sexual activity: Yes     Partners: Female     Birth control/protection: None     Review of Systems   Constitutional: Negative for activity change, appetite change, chills, diaphoresis, fatigue, fever and unexpected weight change.   HENT: Negative for sore throat and trouble swallowing.    Eyes: Negative for visual disturbance.   Respiratory: Negative for chest tightness and shortness of breath.    Cardiovascular: Negative for chest pain and leg swelling.   Gastrointestinal: Positive for abdominal pain and vomiting. Negative for abdominal distention, anal bleeding, blood in stool, constipation, diarrhea and nausea.   Genitourinary: Negative for dysuria and hematuria.   Musculoskeletal: Negative for arthralgias and myalgias.   Skin: Negative for rash.   Neurological: Negative for dizziness, weakness, light-headedness and headaches.   Psychiatric/Behavioral: Negative for agitation and confusion.     Objective:     Vital Signs (Most Recent):  Temp: 97.7 °F (36.5 °C) (19 0745)  Pulse: 84 (19 0745)  Resp: 16 (19 0745)  BP: (!) 89/53 (19 0745)  SpO2: 95 % (19 0745) Vital Signs (24h Range):  Temp:  [96 °F (35.6 °C)-98.4 °F (36.9 °C)] 97.7 °F (36.5 °C)  Pulse:  [81-88] 84  Resp:  [16-18] 16  SpO2:  [94 %-98 %] 95 %  BP: (87-97)/(45-55) 89/53     Weight: 60.3  kg (133 lb) (06/27/19 1415)  Body mass index is 22.13 kg/m².      Intake/Output Summary (Last 24 hours) at 6/28/2019 0812  Last data filed at 6/27/2019 1714  Gross per 24 hour   Intake 200 ml   Output 300 ml   Net -100 ml       Lines/Drains/Airways     Peripheral Intravenous Line                 Midline Catheter Insertion/Assessment  - Single Lumen 06/26/19 1125 Right brachial vein 18g x 10cm 1 day                Physical Exam   Constitutional: She is oriented to person, place, and time. No distress.   Somnolent but easily awoken. Appears comfortable. Answering questions appropriately but appears frustrated. Frail.   HENT:   Head: Normocephalic and atraumatic.   Mouth/Throat: Oropharynx is clear and moist. No oropharyngeal exudate.   Eyes: Conjunctivae are normal. Scleral icterus is present.   Cardiovascular: Normal rate, regular rhythm, normal heart sounds and intact distal pulses.   Pulmonary/Chest: Effort normal and breath sounds normal. No respiratory distress.   Abdominal: Soft. Bowel sounds are normal. She exhibits no distension and no mass. There is tenderness. There is no rebound and no guarding.   Soft, abdomen, mild diffuse tenderness. Ascites present.  KASSIE: light brown stool   Musculoskeletal: She exhibits no edema or tenderness.   Lymphadenopathy:     She has no cervical adenopathy.   Neurological: She is alert and oriented to person, place, and time.   Skin: Skin is warm. Capillary refill takes less than 2 seconds. No rash noted. She is not diaphoretic.   Psychiatric: She has a normal mood and affect. Her behavior is normal. Judgment and thought content normal.   Nursing note and vitals reviewed.      Significant Labs:  All pertinent lab results from the last 24 hours have been reviewed.    Significant Imaging:  Imaging results within the past 24 hours have been reviewed.    Assessment/Plan:     Coffee ground emesis  Ms Gallagher is a 38 year old female with history of ETOH ESLD transferred from [place] for [place]  evaluation; GI consulted due to concern for coffee-grind emesis.    Currently HDS. Alert and oriented but somewhat confused. Coffee-grind emesis last on 6/27 with decline in H&H 9.3 -> 8.5. Otherwise labs stable. AKSSIE negative (brown stool). Likely coffee-grind emesis due to esophagitis vs portal hypertensive gastropathy. Presentation not consistent with variceal bleed.    Plan  - NPO  - EGD today to evaluate further  - protonix 40mg BID  - continue octreotide gtt, can discontinue if negative EGD  - antibiotics x5 days  - trend H&H, maintain hgb > 7  - maintain 2 large bore peripheral IV          Thank you for your consult. I will follow-up with patient. Please contact us if you have any additional questions.    Clint Elizalde MD  Gastroenterology  [facility]

## 2019-06-28 NOTE — SUBJECTIVE & OBJECTIVE
Past Medical History:   Diagnosis Date    Acute alcoholic hepatitis 2019    Acute liver failure without hepatic coma 2019    Alcohol use disorder, severe, dependence 2019    Coagulopathy 2019    Hepatic encephalopathy 2019    Hyponatremia 2019    Thrombocytopenia due to hypersplenism 2019       Past Surgical History:   Procedure Laterality Date     SECTION      CHOLECYSTECTOMY         Review of patient's allergies indicates:   Allergen Reactions    Zofran [ondansetron hcl (pf)]      headache     Family History     None        Tobacco Use    Smoking status: Current Every Day Smoker     Packs/day: 1.00     Years: 15.00     Pack years: 15.00     Types: Cigarettes   Substance and Sexual Activity    Alcohol use: Not Currently     Comment: 19 last drink    Drug use: Never    Sexual activity: Yes     Partners: Female     Birth control/protection: None     Review of Systems   Constitutional: Negative for activity change, appetite change, chills, diaphoresis, fatigue, fever and unexpected weight change.   HENT: Negative for sore throat and trouble swallowing.    Eyes: Negative for visual disturbance.   Respiratory: Negative for chest tightness and shortness of breath.    Cardiovascular: Negative for chest pain and leg swelling.   Gastrointestinal: Positive for abdominal pain and vomiting. Negative for abdominal distention, anal bleeding, blood in stool, constipation, diarrhea and nausea.   Genitourinary: Negative for dysuria and hematuria.   Musculoskeletal: Negative for arthralgias and myalgias.   Skin: Negative for rash.   Neurological: Negative for dizziness, weakness, light-headedness and headaches.   Psychiatric/Behavioral: Negative for agitation and confusion.     Objective:     Vital Signs (Most Recent):  Temp: 97.7 °F (36.5 °C) (1945)  Pulse: 84 (19 0745)  Resp: 16 (1945)  BP: (!) 89/53 (19 0745)  SpO2: 95 % (1945)  Vital Signs (24h Range):  Temp:  [96 °F (35.6 °C)-98.4 °F (36.9 °C)] 97.7 °F (36.5 °C)  Pulse:  [81-88] 84  Resp:  [16-18] 16  SpO2:  [94 %-98 %] 95 %  BP: (87-97)/(45-55) 89/53     Weight: 60.3 kg (133 lb) (06/27/19 1415)  Body mass index is 22.13 kg/m².      Intake/Output Summary (Last 24 hours) at 6/28/2019 0812  Last data filed at 6/27/2019 1714  Gross per 24 hour   Intake 200 ml   Output 300 ml   Net -100 ml       Lines/Drains/Airways     Peripheral Intravenous Line                 Midline Catheter Insertion/Assessment  - Single Lumen 06/26/19 1125 Right brachial vein 18g x 10cm 1 day                Physical Exam   Constitutional: She is oriented to person, place, and time. No distress.   Somnolent but easily awoken. Appears comfortable. Answering questions appropriately but appears frustrated. Frail.   HENT:   Head: Normocephalic and atraumatic.   Mouth/Throat: Oropharynx is clear and moist. No oropharyngeal exudate.   Eyes: Conjunctivae are normal. Scleral icterus is present.   Cardiovascular: Normal rate, regular rhythm, normal heart sounds and intact distal pulses.   Pulmonary/Chest: Effort normal and breath sounds normal. No respiratory distress.   Abdominal: Soft. Bowel sounds are normal. She exhibits no distension and no mass. There is tenderness. There is no rebound and no guarding.   Soft, abdomen, mild diffuse tenderness. Ascites present.  KASSIE: light brown stool   Musculoskeletal: She exhibits no edema or tenderness.   Lymphadenopathy:     She has no cervical adenopathy.   Neurological: She is alert and oriented to person, place, and time.   Skin: Skin is warm. Capillary refill takes less than 2 seconds. No rash noted. She is not diaphoretic.   Psychiatric: She has a normal mood and affect. Her behavior is normal. Judgment and thought content normal.   Nursing note and vitals reviewed.      Significant Labs:  All pertinent lab results from the last 24 hours have been reviewed.    Significant  Imaging:  Imaging results within the past 24 hours have been reviewed.

## 2019-06-28 NOTE — PLAN OF CARE
Problem: Adult Inpatient Plan of Care  Goal: Plan of Care Review  Outcome: Ongoing (interventions implemented as appropriate)  Plan of care reviewed with patient and spouse. Hypotensive, vomited x 2 coffee ground emesis. Started Octeotride infusion. Received 1unit fresh frozen plasma. Critical care consult. SAY at bedside. Frequent rounding and safety maintained. No falls. Will continue to monitor.

## 2019-06-28 NOTE — CARE UPDATE
"RAPID RESPONSE NURSE PROACTIVE ROUNDING NOTE     Time of Visit:      Admit Date: 2019  LOS: 1  Code Status: Full Code   Date of Visit: 2019  : 1980  Age: 38 y.o.  Sex: female  Race: White  Bed: 8096/8096 A:   MRN: [id number]  Was the patient discharged from an ICU this admission? no   Was the patient discharged from a PACU within last 24 hours?  no  Did the patient receive conscious sedation/general anesthesia in last 24 hours?  no  Was the patient in the ED within the past 24 hours?  no  Was the patient started on NIPPV within the past 24 hours?  no  Attending Physician: Marisa Banks MD  Primary Service: Networked reference to record PCT     ASSESSMENT     Diagnosis: Acute liver failure without hepatic coma    Abnormal Vital Signs: BP (!) 88/45 (BP Location: Left arm, Patient Position: Lying)   Pulse 86   Temp 98.4 °F (36.9 °C) (Oral)   Resp 17   Ht 5' 5" (1.651 m)   Wt 60.3 kg (133 lb)   LMP  (LMP Unknown)   SpO2 95%   Breastfeeding? No   BMI 22.13 kg/m²      Clinical Issues: Circulatory    Patient  has a past medical history of Acute alcoholic hepatitis, Acute liver failure without hepatic coma, Alcohol use disorder, severe, dependence, Coagulopathy, Hepatic encephalopathy, Hyponatremia, and Thrombocytopenia due to hypersplenism.    Patient seen for proactive rounds. Patient alert and oriented, answering questions with  at bedside, about to shower. Per , patient's mental status improving since earlier today.   Patient on continuous octreotide gtt for drop in H&H and some coffee-ground emesis earlier today. Plan for GI scope tomorrow  Transfused FFP for elevated INR.   Plan to hold diuretics and restrict fluids for hyponatremia. Pt's Creatinine elevated, patient still making some urine.       INTERVENTIONS/ RECOMMENDATIONS     Patient may benefit from continued monitoring of CBCs and coags, transfuse RBCs and FFP as appropriate. Also continue to monitor kidney " function in presence of liver failure. Patient may also benefit from GI scope, planned for tomorrow.     Discussed plan of care with RNRashida.    PHYSICIAN ESCALATION     Yes/No  no    Orders received and case discussed with NA.    Disposition: Remain in room 8096.    FOLLOW-UP     Call back the Rapid Response Nurse, Jose Martin Levi RN at [phone] for additional questions or concerns.

## 2019-06-28 NOTE — HPI
Ms Gallagher is a 38 year old female with history of ETOH ESLD transferred from [place] for [place]; GI consulted due to concern for coffee-grind emesis.    [place] [facility] [place] due to decompensated ESLD for transplant evaluation. Admitted initially to OSH 6/6 with abdominal pain, nausea, vomiting and jaundice. At OSH treated for SBP and AMS.    In afternoon 6/27 she was noted to have ~2x 200ml episodes of coffee-grind emesis therefore she was started on PPI, octreotide and GI consulted for evaluation. She cannot recall details of her vomitus but per  appeared dark and grainy. Notes her stools have not been bloody or black but appeared more grainy and dark yesterday. She denies nausea or vomiting overnight or currently feeling nauseated but endorses abdominal pain. Denies dysphagia or odynophagia. Endorses reflux.    Denies prior history of GI bleeding. Denies prior history of EGD. Last colonoscopy reportedly normal within 1 year. Denies NSAID with exception of prn aleve prior to admission.    On admission labs notable for Hgb 10.4 which declined to 9.3 on 6/27 and overnight declined and stabilized ~8.5. Plt 51, INR 2.4, fibrinogen 247. Not on any anticoagulants or antiplatelet agents.

## 2019-06-28 NOTE — PROGRESS NOTES
Progress Note  Hospital Medicine  [facility] [place]       Patient Name: Destinee Gallagher  MRN:  [id number]  Hospital Medicine Team: Networked reference to record PCT  Marisa Banks MD  Date of Admission:  6/26/2019     Length of Stay:  LOS: 2 days   Expected Discharge Date: 7/3/2019  Principal Problem:  Acute liver failure without hepatic coma     Subjective:     Interval History/Overnight Events:      Patient is more awake and alert today. does not appear in any distress. No further episodes of coffee-ground emesis.  Hemoglobin 8.5.  Did not respond to FFP or vitamin K and INR remains 2.4 .  Creatinine remains 2.2 despite H RS medications.  Sodium remains 119.  Blood pressures are very tenuous with maps barely at 65.  Patient was supposed to go for EGD today, however held off on the study given tenuous blood pressures and possibility of further decompensated patient with this procedure.  Will plan to monitor for further bleeding and reconsider EGD at a later time    Attempting to complete inpatient liver transplant evaluation on this patient.  She was seen by Psychiatry and deemed very high risk and alcohol rehab was recommended.  Patient explicitly stated that she does not need Alcohol Rehab , she does not believe that she has a problem.  She stated that her current problem is due to alcohol binge earlier in May due to problems in personal life and associated anxiety with that.  As per , patient has underwent multiple rehabs in the past and relapsed.  The longest patient could ever go without relapsing was 1 year in early 2000s    Patient remains very low threshold to ICU admission     albumin human 25%  25 g Intravenous BID    [START ON 6/29/2019] ciprofloxacin HCl  500 mg Oral Daily    folic acid  1 mg Oral Daily    lactulose  45 g Oral TID    lidocaine  2 patch Transdermal Q24H    midodrine  15 mg Oral TID    octreotide  100 mcg Intravenous Q8H    pantoprazole  40 mg  Oral BID AC    rifAXImin  550 mg Oral BID    thiamine  100 mg Oral Daily           acetaminophen, Dextrose 10% Bolus, Dextrose 10% Bolus, glucagon (human recombinant), glucose, glucose, polyethylene glycol, promethazine, ramelteon, sodium chloride 0.9%, traMADol    Review of Systems   Constitutional: Negative for chills, fatigue, fever.   HENT: Negative for sore throat, trouble swallowing.    Eyes: Negative for photophobia, visual disturbance.   Respiratory: Negative for cough, shortness of breath.    Cardiovascular: Negative for chest pain, palpitations, leg swelling.   Gastrointestinal: Negative for abdominal pain, constipation, diarrhea, nausea, vomiting.   Endocrine: Negative for cold intolerance, heat intolerance.   Genitourinary: Negative for dysuria, frequency.   Musculoskeletal: Negative for arthralgias, myalgias.   Skin: Negative for rash, wound, erythema   Neurological: Negative for dizziness, syncope, weakness, light-headedness.   Psychiatric/Behavioral: Negative for confusion, hallucinations, anxiety  All other systems reviewed and are negative.    Objective:     Temp:  [97 °F (36.1 °C)-98.4 °F (36.9 °C)]   Pulse:  [79-88]   Resp:  [16-18]   BP: ()/(45-57)   SpO2:  [94 %-99 %]       Physical Exam:  Constitutional: appears older than stated age, chronically ill looking,  non-distressed, not diaphoretic.   HENT: NC/AT, external ears normal, oropharynx clear, MMM w/o exudates.   Eyes: PERRL, EOMI, conjunctiva normal, no discharge b/l, +scleral icterus   Neck: normal ROM, supple  CV: RRR, no m/r/g, no carotid bruits, +2 peripheral pulses.  Pulmonary/Chest wall: Breathing comfortably w/o distress, CTAB, no w/r/r, no crackles.  Decreased breath sounds at lung bases  GI: Soft, non-tender, (+) BS, (+) BM   +distended, tender palpation    Musculoskeletal: Normal ROM, no atrophy,  + pitting edema in LE bilaterally   Neurological: AAO x 4, CN II-XI in tact, nl sensation, nl strength/tone     Skin: warm, dry    + jaundice, + significant spider angiomas, +bruising   Psych: normal mood and affect, normal behavior, thought content and judgement.    Labs:    Chemistries:   Recent Labs   Lab 06/26/19  0802  06/27/19  0403  06/27/19 1917 06/28/19  0008 06/28/19  0424   *   < > 117*   < > 118* 117* 119*  119*   K 4.0   < > 4.2   < > 4.1 4.0 4.2  4.2   CL 88*   < > 87*   < > 88* 87* 89*  89*   CO2 18*   < > 18*   < > 17* 17* 16*  16*   BUN 64*   < > 71*   < > 71* 72* 72*  72*   CREATININE 1.3   < > 1.6*   < > 2.2* 2.5* 2.2*  2.2*   CALCIUM 9.7   < > 9.3   < > 9.0 9.3 8.9  8.9   PROT 5.8*  --  5.1*  --   --   --  5.0*   BILITOT 28.4*  --  25.4*  --   --   --  24.9*   ALKPHOS 345*  --  318*  --   --   --  245*   ALT 15  --  15  --   --   --  12   AST 96*  --  93*  --   --   --  88*   MG 2.1  --  2.2  --   --   --  2.1   PHOS 6.2*  --  6.5*  --   --   --  6.3*    < > = values in this interval not displayed.        WBC:   Recent Labs   Lab 06/27/19  0403 06/27/19  1326 06/27/19 1917 06/28/19 0008 06/28/19  0425   WBC 23.88* 21.88* 19.51* 20.05* 19.46*     Bands:     CBC/Anemia Labs: Coags:    Recent Labs   Lab 06/27/19 1917 06/28/19 0008 06/28/19  0425   WBC 19.51* 20.05* 19.46*   HGB 8.8* 8.5* 8.5*   HCT 24.9* 23.3* 23.6*   PLT 61* 54* 51*   * 99* 100*   RDW 17.2* 16.9* 16.9*    Recent Labs   Lab 06/26/19  0802 06/27/19  0403 06/28/19  0425   INR 2.4* 2.4* 2.4*        POCT Glucose: HbA1c:    Recent Labs   Lab 06/27/19  0748 06/27/19  1143 06/27/19  1817 06/27/19  2202 06/27/19  2250 06/28/19  0748   POCTGLUCOSE 202* 89 261* 67* 156* 100    No results found for: HGBA1C     Diagnostic Results:        Assessment and Plan     Hospital Course:    Ms. Destinee Gallagher is a 38 y.o. female who presented to [facility] on 6/26/2019 with ETOH hepatitis and acute liver failure , associated with hepatic encephalopathy, Severe hyponatremia with sodium level of 117, ascites, coagulopathy and thrombocytopenia, as well as  development of upper GI bleed on 06/27/2019, and  ZULAY on 06/27/2019    Active Hospital Problems    Diagnosis  POA    *Acute liver failure without hepatic coma [K72.00]  Yes    Coffee ground emesis [K92.0]  Yes    Upper GI bleed [K92.2]  No    Acute blood loss anemia [D62]  No    Hepatorenal syndrome [K76.7]  No    Acute alcoholic hepatitis [K70.10]  Yes    Ascites due to alcoholic hepatitis [K70.11]  Yes     Has had hx of SBP , as per family       Hepatic encephalopathy [K72.90]  Yes    Coagulopathy [D68.9]  Yes    Hyponatremia [E87.1]  Yes    Alcohol use disorder, severe, dependence [F10.20]  Yes    Macrocytic anemia [D53.9]  Yes    Leukocytosis [D72.829]  Yes    Hypotension [I95.9]  Yes    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     Acute liver failure without hepatic coma  Acute alcoholic hepatitis with ascites  Alcoholic cirrhosis of liver with ascites     MELD-Na score: 38 at 6/28/2019  4:25 AM  MELD score: 36 at 6/28/2019  4:25 AM  Calculated from:  Serum Creatinine: 2.2 mg/dL at 6/28/2019  4:24 AM  Serum Sodium: 119 mmol/L (Rounded to 125 mmol/L) at 6/28/2019  4:24 AM  Total Bilirubin: 24.9 mg/dL at 6/28/2019  4:24 AM  INR(ratio): 2.4 at 6/28/2019  4:25 AM  Age: 38 years     - decompensation due to recent alcohol binge drinking.  Meld score of 35 on admission  - hepatology consulted.  Obtaining financial approval for initiation of inpatient liver transplant evaluation  - PETH ordered and pending  - viral and autoimmune markers negative  - serology for liver transplant evaluation -ordered and pending.  - LVP on 06/26/2019,  3.4 L drained.  No SBP found on ascitic fluid.  Needs at least p.o. ciprofloxacin prophylaxis given  history of SBP in the past  - treat hypotension with midodrine.   - p.o. Lactulose and PO rifaximin for hepatic encephalopathy  - IV vitamin K for coagulopathy  - Psychiatry consulted on the case.  - patient with decompensation on 06/27/2019 and some of the pre liver transplant testing and imaging was deferred that day. Restarted on 6/28. Needs GYN, ID, mammo, LTS, possibly an EGD for txp eval as well     Upper GI bleed  Acute blood loss anemia  - patient with coffee-ground emesis on 06/27/2019 with hemoglobin decreased from 10-8.8.  May be due to portal hypertensive gastropathy, however given history of cirrhosis, there is definitely concern for variceal bleeding  - patient started on IV ceftriaxone for SBP prophylaxis in lieu of GI bleeding  - started on IV octreotide drip and  IV PPI b.i.d. On 6/27. Planned  for EGD on 06/28/2019 but procedure was deferred due to high chance of decompensation  - will plan to transfuse for hemoglobin likely <8  - 1 unit of FFP transfusion on 06/27/2019 given significant coagulopathy with INR greater than 2 and active bleeding  - IV oct gtt and IV ppi stopped on 6/28 , IV ceftriaxone de-escalated to Cipro prophylaxis.  Plan to monitor for further bleeding and reassess for necessity for EGD    Hepatorenal syndrome  - creatinine increasing to 2.1 on 06/27/2019.  Creatinine on admission 1.3, patient was receiving diuretics at outside hospital.  Urine sodium is less than 20, with ascites and hypotension.  Which is highly highly concerning for hepatorenal syndrome  - continue max dose midodrine 15 t.i.d.  - patient has been on subcu octreotide and transition to octreotide drip for upper GI bleeding  - ICU consulted on 06/27/2019 given tenuous blood pressures, worsening encephalopathy, worsening renal failure, significant hyponatremia,  upper GI bleeding.  Patient will need close monitoring and possibly transfer for pressors to the ICU, when deemed appropriate by the ICU team  - cont  midodrine, IV octreotide and albumin.   - if Cr continues to rise, may need to involve nephrology and possibly transfer to ICU for IV pressors for MAP > 85      Acute on Chronic hepatic encephalopathy  - PO lactulose  - PO rifaximin  - monitor BMs, goal of 3-4 . Monitor mental status   - development of slightly worsening hepatic encephalopathy on 06/27/2019.  Likely due to development of upper GI bleed.     Alcoh ol use disorder, severe, dependence  - with history of alcoholic cirrhosis and alcoholic hepatitis leading to acute liver failure  - urine toxicology ordered  - PETH ordered  - Psychiatry consulted on the case- deemed patient high risk  - Patient explicitly stated that she does not need Alcohol Rehab . She stated that her current problem is due to alcohol binge earlier in May due to problems in personal life and associated anxiety with that.  As per , patient has underwent multiple rehabs in the past and relapsed.  The longest patient could ever go without relapsing was 1 year in early 2000s     Hyponatremia  - sodium of 118 on admission, patient has been getting diuretics at outside hospital.  Likely hypervolemic hyponatremia in setting of decompensated cirrhosis and volume overload  - hold diuretics at this time  - fluid restriction to 1 L per day  - given hypotension hyponatremia and relatively normal potassium, AM cortisol level checked and noted to be at 7.8.   - Na 118 despite fluid restriction  - plan for cosyntropin stim test to evaluated for adrenal insufficiency . Patient has been on steroids up until recently , for etoh hepatitis      Hypotension  - likely due to liver failure and decompensated hepatic cirrhosis  - patient started on midodrine 5 mg t.i.d.--> increased to 15 t.i.d. given very tenuous blood pressures  -very low threshold for  transfer to ICU for IV pressors     Leukocytosis  - WBC count of 23 on admission.  Very likely due to alcoholic hepatitis.  WBC of 21 down trending  from 20/6 at outside hospital  - no SBP and cx remains ngtd  - antibiotics as above  - continue monitor     Anasarca  - likely due to liver failure and decompensated hepatic cirrhosis  - 2D echo with 65 % and Estimated PA pressure is at least 36 mmHg.     Macrocytic Anemia  Thrombocytopenia  Coagulopathy  - hemoglobin of 10 with MCV of 100 on admission.  - platelets of 54 on admission.  - INR of 2.4 on admission likely due to liver failure  - administer IV vitamin K x3 days for coagulopathy-- INR remains 2.4 despite vitamin K  - monitor platelets and hemoglobin daily  - DIC and hemolysis studies negative     Tobacco use disorder  - patient is a current every day smoker  - will assess for necessity of nicotine patch     Diet:  Low Na, fluid restriction   GI PPx:  ppi  DVT PPx:  scds  Goals of Care:  full      High Risk Conditions:  Liver failure     Disposition:      Patient remains very high risk for quick decompensation and low threshold for transfer to ICU    Signing Physician:     Marisa Banks MD  Department of Hospital Medicine   [facility] [place]  Pager [phone] [place] [phone]  6/28/2019

## 2019-06-28 NOTE — ASSESSMENT & PLAN NOTE
Ms Gallagher is a 38 year old female with history of ETOH ESLD transferred from [facility] for liver evaluation; GI consulted due to concern for coffee-grind emesis.    Currently HDS. Alert and oriented but somewhat confused. Coffee-grind emesis last on 6/27 with decline in H&H 9.3 -> 8.5. Otherwise labs stable. KASSIE negative (brown stool). Likely coffee-grind emesis due to esophagitis vs portal hypertensive gastropathy. Presentation not consistent with variceal bleed.    Plan  - NPO  - EGD today to evaluate further  - protonix 40mg BID  - continue octreotide gtt, can discontinue if negative EGD  - antibiotics x5 days  - trend H&H, maintain hgb > 7  - maintain 2 large bore peripheral IV

## 2019-06-28 NOTE — ASSESSMENT & PLAN NOTE
After coffee ground emesis X 2 yesterday afternoon she has not had further episodes, she was scheduled for EGD today however will try to expedite workup and only proceed with EGD in overt bleeding in order to avoid further decompensation with anesthesia. Plan discussed with primary team and GI.    Recommendations:  --Monitor for overt signs of bleeding  --Trend H/H and transfuse as needed  --Protonix 40 mg IV BID  --D/C Octreotide gtt

## 2019-06-28 NOTE — PROGRESS NOTES
Progress Note  Hospital Medicine  [facility] [place]       Patient Name: Destinee Gallagher  MRN:  [id number]  Hospital Medicine Team: Networked reference to record PCT  Marisa Banks MD  Date of Admission:  6/26/2019     Length of Stay:  LOS: 1 day   Expected Discharge Date: 7/3/2019  Principal Problem:  Acute liver failure without hepatic coma     Subjective:     Interval History/Overnight Events:    Patient appears to be a little bit more encephalopathic today with intermittent episodes of confusion, as per .  Developed coffee-ground emesis, couple of episodes throughout the day, with hemoglobin decreased from 10-8.8.  May be due to portal hypertensive gastropathy, however given history of cirrhosis, there is definitely concern for variceal bleeding. patient started on IV ceftriaxone for SBP prophylaxis in lieu of GI bleeding. started on IV octreotide drip, IV PPI b.i.d. plan for EGD on 06/28/2019.  Patient's last EGD was about a year ago .  Checking frequent hemoglobin and will administer an FFP for coagulopathy in setting of bleeding  Sodium remains low at 117.  Creatinine is increasing to 2, highly concerning for H RS.  Patient started on IV albumin, in addition to already being on midodrine, patient is already on octreotide drip for GI bleeding    ICU consulted on 06/27/2019 given tenuous blood pressures, worsening encephalopathy, worsening renal failure, significant hyponatremia,  upper GI bleeding.  Patient was evaluated by the ICU team and deemed stable for the floor, however will require very close monitoring and she is definitely low threshold for transfer to the ICU     albumin human 25%  25 g Intravenous TID    [START ON 6/28/2019] cefTRIAXone (ROCEPHIN) IVPB  2 g Intravenous Q24H    folic acid  1 mg Oral Daily    lactulose  45 g Oral TID    lidocaine  2 patch Transdermal Q24H    midodrine  15 mg Oral TID    pantoprozole (PROTONIX) IV  40 mg Intravenous Q12H     phytonadione ((AQUA-MEPHYTON) IVPB  10 mg Intravenous Daily    rifAXImin  550 mg Oral BID    thiamine  100 mg Oral Daily        octreotide (SANDOSTATIN) infusion 50 mcg/hr (06/27/19 1714)       sodium chloride, sodium chloride, acetaminophen, Dextrose 10% Bolus, Dextrose 10% Bolus, glucagon (human recombinant), glucose, glucose, polyethylene glycol, promethazine, ramelteon, sodium chloride 0.9%, traMADol    Review of Systems   Constitutional: Negative for chills, fatigue, fever.   HENT: Negative for sore throat, trouble swallowing.    Eyes: Negative for photophobia, visual disturbance.   Respiratory: Negative for cough, shortness of breath.    Cardiovascular: Negative for chest pain, palpitations, leg swelling.   Gastrointestinal: Negative for abdominal pain, constipation, diarrhea, nausea, vomiting.   Endocrine: Negative for cold intolerance, heat intolerance.   Genitourinary: Negative for dysuria, frequency.   Musculoskeletal: Negative for arthralgias, myalgias.   Skin: Negative for rash, wound, erythema   Neurological: Negative for dizziness, syncope, weakness, light-headedness.   Psychiatric/Behavioral: Negative for confusion, hallucinations, anxiety  All other systems reviewed and are negative.    Objective:     Temp:  [96 °F (35.6 °C)-98.4 °F (36.9 °C)]   Pulse:  [50-89]   Resp:  [16-18]   BP: (80-97)/(45-56)   SpO2:  [92 %-99 %]       Physical Exam:  Constitutional: appears older than stated age, chronically ill looking,  non-distressed, not diaphoretic.   HENT: NC/AT, external ears normal, oropharynx clear, MMM w/o exudates.   Eyes: PERRL, EOMI, conjunctiva normal, no discharge b/l, +scleral icterus   Neck: normal ROM, supple  CV: RRR, no m/r/g, no carotid bruits, +2 peripheral pulses.  Pulmonary/Chest wall: Breathing comfortably w/o distress, CTAB, no w/r/r, no crackles.  Decreased breath sounds at lung bases  GI: Soft, non-tender, (+) BS, (+) BM   +distended, tense and tender palpation     Musculoskeletal: Normal ROM, no atrophy,  + pitting edema in LE bilaterally   Neurological: AAO x 4, CN II-XI in tact, nl sensation, nl strength/tone   patient appears more somnolent today with intermittent episodes of confusion  Skin: warm, dry   + jaundice, + significant spider angiomas, +bruising   Psych: normal mood and affect, normal behavior, thought content and judgement.    Labs:    Chemistries:   Recent Labs   Lab 06/26/19  0802 06/26/19  1901 06/27/19  0403 06/27/19  1154   * 118* 117* 118*   K 4.0 3.9 4.2 3.9   CL 88* 86* 87* 88*   CO2 18* 22* 18* 19*   BUN 64* 64* 71* 70*   CREATININE 1.3 1.6* 1.6* 2.1*   CALCIUM 9.7 9.6 9.3 8.8   PROT 5.8*  --  5.1*  --    BILITOT 28.4*  --  25.4*  --    ALKPHOS 345*  --  318*  --    ALT 15  --  15  --    AST 96*  --  93*  --    MG 2.1  --  2.2  --    PHOS 6.2*  --  6.5*  --         WBC:   Recent Labs   Lab 06/26/19  0802 06/27/19  0403 06/27/19  1326 06/27/19 1917   WBC 23.39* 23.88* 21.88* 19.51*     Bands:     CBC/Anemia Labs: Coags:    Recent Labs   Lab 06/27/19  0403 06/27/19  1326 06/27/19 1917   WBC 23.88* 21.88* 19.51*   HGB 9.4* 8.9* 8.8*   HCT 25.5* 23.8* 24.9*   PLT 55* 52* 61*   MCV 98 96 101*   RDW 17.0* 17.0* 17.2*    Recent Labs   Lab 06/26/19  0802 06/27/19  0403   INR 2.4* 2.4*        POCT Glucose: HbA1c:    Recent Labs   Lab 06/27/19  0632 06/27/19  0748 06/27/19  1143 06/27/19  1817   POCTGLUCOSE 60* 202* 89 261*    No results found for: HGBA1C     Diagnostic Results:        Assessment and Plan     Hospital Course:    Ms. Destinee Gallagher is a 38 y.o. female who presented to [facility] on 6/26/2019 with ETOH hepatitis and acute liver failure , associated with hepatic encephalopathy, Severe hyponatremia with sodium level of 117, ascites, coagulopathy and thrombocytopenia, as well as development of upper GI bleed on 06/27/2019, and  ZULAY on 06/27/2019    Active Hospital Problems    Diagnosis  POA    *Acute liver failure without hepatic coma [K72.00]   Yes    Coffee ground emesis [K92.0]  Yes    Upper GI bleed [K92.2]  No    Acute blood loss anemia [D62]  No    Hepatorenal syndrome [K76.7]  No    Acute alcoholic hepatitis [K70.10]  Yes    Ascites due to alcoholic hepatitis [K70.11]  Yes     Has had hx of SBP , as per family       Hepatic encephalopathy [K72.90]  Yes    Coagulopathy [D68.9]  Yes    Hyponatremia [E87.1]  Yes    Alcohol use disorder, severe, dependence [F10.20]  Yes    Macrocytic anemia [D53.9]  Yes    Leukocytosis [D72.829]  Yes    Hypotension [I95.9]  Yes    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     Acute liver failure without hepatic coma  Acute alcoholic hepatitis with ascites  Alcoholic cirrhosis of liver with ascites     MELD-Na score: 38 at 6/27/2019 11:54 AM  MELD score: 36 at 6/27/2019 11:54 AM  Calculated from:  Serum Creatinine: 2.1 mg/dL at 6/27/2019 11:54 AM  Serum Sodium: 118 mmol/L (Rounded to 125 mmol/L) at 6/27/2019 11:54 AM  Total Bilirubin: 25.4 mg/dL at 6/27/2019  4:03 AM  INR(ratio): 2.4 at 6/27/2019  4:03 AM  Age: 38 years     - decompensation due to recent alcohol binge drinking.  Meld score of 35 on admission  - hepatology consulted.  Obtaining financial approval for initiation of inpatient liver transplant evaluation  - [facility] ordered and pending  - viral and autoimmune markers negative  - serology for liver transplant evaluation -ordered and pending.  - LVP on 06/26/2019,  3.4 L drained.  No SBP found on ascitic fluid.  Needs at least p.o. ciprofloxacin prophylaxis given history of SBP in the past  - treat hypotension with midodrine.   - p.o. Lactulose and PO rifaximin for hepatic encephalopathy  - IV vitamin K for coagulopathy  -  Psychiatry consulted on the case.  - patient with decompensation on 06/27/2019 and some of the pre liver transplant testing and imaging was deferred    Upper GI bleed  Acute blood loss anemia  - patient with coffee-ground emesis on 06/27/2019 with hemoglobin decreased from 10-8.8.  May be due to portal hypertensive gastropathy, however given history of cirrhosis, there is definitely concern for variceal bleeding  - patient started on IV ceftriaxone for SBP prophylaxis in lieu of GI bleeding  - started on IV octreotide drip  - IV PPI b.i.d.  - plan for EGD on 06/28/2019  - monitor mental status as well as aspiration risk  - will plan to transfuse for hemoglobin likely <8  - 1 unit of FFP transfusion on 06/27/2019 given significant coagulopathy with INR greater than 2 and active bleeding    Hepatorenal syndrome  - creatinine increasing to 2.1 on 06/27/2019.  Creatinine on admission 1.3, patient was receiving diuretics at outside hospital.  Urine sodium is less than 20, with ascites and hypotension.  Which is highly highly concerning for hepatorenal syndrome  - patient started on IV albumin  - continue max dose midodrine 15 t.i.d.  - patient has been on subcu octreotide and transition to octreotide drip for upper GI bleeding  - ICU consulted on 06/27/2019 given tenuous blood pressures, worsening encephalopathy, worsening renal failure, significant hyponatremia,  upper GI bleeding.  Patient will need close monitoring and possibly transfer for pressors to the ICU, when deemed appropriate by the ICU team      Acute on Chronic hepatic encephalopathy  - PO lactulose  - PO rifaximin  - monitor BMs, goal of 3-4 . Monitor mental status   - development of slightly worsening hepatic encephalopathy on 06/27/2019.  Likely due to development of upper GI bleed.     Alcoh ol use disorder, severe, dependence  - with history of alcoholic cirrhosis and alcoholic hepatitis leading to acute liver failure  - urine toxicology ordered  -  [facility] ordered  - Psychiatry consulted on the case- deemed patient high risk     Hyponatremia  - sodium of 118 on admission, patient has been getting diuretics at outside hospital.  Likely hypervolemic hyponatremia in setting of decompensated cirrhosis and volume overload  - hold diuretics at this time  - fluid restriction to 1 L per day  - given hypotension hyponatremia and relatively normal potassium, AM cortisol level checked and noted to be at 7.8  - Na 118 despite fluid restriction     Hypotension  - likely due to liver failure and decompensated hepatic cirrhosis  - patient started on midodrine 5 mg t.i.d.--> increased to 15 t.i.d. given very tenuous blood pressures     Leukocytosis  - WBC count of 23 on admission.  Very likely due to alcoholic hepatitis.  WBC of 21 down trending from 20/6 at outside hospital  - check blood cultures and urine cultures.  - evaluate for SBP with paracentesis  - antibiotics as above  - continue monitor     Anasarca  - likely due to liver failure and decompensated hepatic cirrhosis  - check 2D echo to evaluate condition of the heart     Macrocytic Anemia  Thrombocytopenia  Coagulopathy  - hemoglobin of 10 with MCV of 100 on admission.  - platelets of 54 on admission.  - INR of 2.4 on admission likely due to liver failure  - administer IV vitamin K x3 days for coagulopathy-- INR remains 2.4 despite vitamin K  - monitor platelets and hemoglobin daily  - DIC and hemolysis studies negative     Tobacco use disorder  - patient is a current every day smoker  - will assess for necessity of nicotine patch     Diet:  Low Na, fluid restriction   GI PPx:  ppi  DVT PPx:  scds  Goals of Care:  full      High Risk Conditions:  Liver failure     Disposition:      ICU consulted on 06/27/2019 given tenuous blood pressures, worsening encephalopathy, worsening renal failure, significant hyponatremia,  upper GI bleeding.  Patient was evaluated by the ICU team and deemed stable for the floor, however will  require very close monitoring and she is definitely low threshold for transfer to the ICU    Critical Care Time: 45 minutes  Critical care was time spent personally by me on the following activities: evaluating this patient's organ dysfunction, development of treatment plan, discussing treatment plan with patient or surrogate and bedside caregivers, discussions with consultants, evaluation of patient's response to treatment, examination of patient, ordering and performing treatments and interventions, ordering and review of laboratory studies, ordering and review of radiographic studies, re-evaluation of patient's condition. This critical care time did not overlap with that of any other provider or involve time for any procedures    Signing Physician:     Marisa Banks MD  Department of Hospital Medicine   [facility]  Pager [phone] [facility] [phone]  6/27/2019

## 2019-06-28 NOTE — PLAN OF CARE
Problem: Adult Inpatient Plan of Care  Goal: Plan of Care Review  Outcome: Ongoing (interventions implemented as appropriate)  AAOx4. VSS. Midline placed in right arm today. Midline intact left arm. Continues on lactulose. No tarry stools, or old blood . More alert today. Continues on lactulose. Octerotride every 8 hours. Moore catheter inserted without difficulty. Spouse present at bedside. Ultram and Phenergan given x1 today. No other acute changes. Frequent rounding and safety maintained. No falls. Will continue to monitor.

## 2019-06-29 LAB
ALBUMIN SERPL BCP-MCNC: 3.1 G/DL (ref 3.5–5.2)
ALP SERPL-CCNC: 254 U/L (ref 55–135)
ALT SERPL W/O P-5'-P-CCNC: 12 U/L (ref 10–44)
ANION GAP SERPL CALC-SCNC: 15 MMOL/L (ref 8–16)
ANION GAP SERPL CALC-SCNC: 16 MMOL/L (ref 8–16)
AST SERPL-CCNC: 97 U/L (ref 10–40)
BACTERIA #/AREA URNS AUTO: ABNORMAL /HPF
BASOPHILS # BLD AUTO: 0.03 K/UL (ref 0–0.2)
BASOPHILS # BLD AUTO: 0.04 K/UL (ref 0–0.2)
BASOPHILS NFR BLD: 0.1 % (ref 0–1.9)
BASOPHILS NFR BLD: 0.2 % (ref 0–1.9)
BILIRUB SERPL-MCNC: 27.3 MG/DL (ref 0.1–1)
BILIRUB UR QL STRIP: ABNORMAL
BUN SERPL-MCNC: 70 MG/DL (ref 6–20)
BUN SERPL-MCNC: 73 MG/DL (ref 6–20)
CALCIUM SERPL-MCNC: 9.2 MG/DL (ref 8.7–10.5)
CALCIUM SERPL-MCNC: 9.8 MG/DL (ref 8.7–10.5)
CHLORIDE SERPL-SCNC: 92 MMOL/L (ref 95–110)
CHLORIDE SERPL-SCNC: 95 MMOL/L (ref 95–110)
CLARITY UR REFRACT.AUTO: ABNORMAL
CO2 SERPL-SCNC: 15 MMOL/L (ref 23–29)
CO2 SERPL-SCNC: 16 MMOL/L (ref 23–29)
COLOR UR AUTO: ABNORMAL
CORTIS SERPL-MCNC: 13.2 UG/DL
CORTIS SERPL-MCNC: 15.6 UG/DL
CREAT SERPL-MCNC: 2.8 MG/DL (ref 0.5–1.4)
CREAT SERPL-MCNC: 3 MG/DL (ref 0.5–1.4)
DIFFERENTIAL METHOD: ABNORMAL
DIFFERENTIAL METHOD: ABNORMAL
EOSINOPHIL # BLD AUTO: 0.3 K/UL (ref 0–0.5)
EOSINOPHIL # BLD AUTO: 0.3 K/UL (ref 0–0.5)
EOSINOPHIL NFR BLD: 1.3 % (ref 0–8)
EOSINOPHIL NFR BLD: 1.4 % (ref 0–8)
ERYTHROCYTE [DISTWIDTH] IN BLOOD BY AUTOMATED COUNT: 16.7 % (ref 11.5–14.5)
ERYTHROCYTE [DISTWIDTH] IN BLOOD BY AUTOMATED COUNT: 16.8 % (ref 11.5–14.5)
EST. GFR  (AFRICAN AMERICAN): 21.9 ML/MIN/1.73 M^2
EST. GFR  (AFRICAN AMERICAN): 23.8 ML/MIN/1.73 M^2
EST. GFR  (NON AFRICAN AMERICAN): 19 ML/MIN/1.73 M^2
EST. GFR  (NON AFRICAN AMERICAN): 20.6 ML/MIN/1.73 M^2
GLUCOSE SERPL-MCNC: 118 MG/DL (ref 70–110)
GLUCOSE SERPL-MCNC: 148 MG/DL (ref 70–110)
GLUCOSE UR QL STRIP: NEGATIVE
HCT VFR BLD AUTO: 21.7 % (ref 37–48.5)
HCT VFR BLD AUTO: 22.1 % (ref 37–48.5)
HGB BLD-MCNC: 8 G/DL (ref 12–16)
HGB BLD-MCNC: 8.2 G/DL (ref 12–16)
HGB UR QL STRIP: ABNORMAL
HYALINE CASTS UR QL AUTO: 2 /LPF
IMM GRANULOCYTES # BLD AUTO: 0.22 K/UL (ref 0–0.04)
IMM GRANULOCYTES # BLD AUTO: 0.24 K/UL (ref 0–0.04)
IMM GRANULOCYTES NFR BLD AUTO: 1.1 % (ref 0–0.5)
IMM GRANULOCYTES NFR BLD AUTO: 1.2 % (ref 0–0.5)
INR PPP: 2.5 (ref 0.8–1.2)
KETONES UR QL STRIP: NEGATIVE
LEUKOCYTE ESTERASE UR QL STRIP: ABNORMAL
LYMPHOCYTES # BLD AUTO: 0.6 K/UL (ref 1–4.8)
LYMPHOCYTES # BLD AUTO: 0.9 K/UL (ref 1–4.8)
LYMPHOCYTES NFR BLD: 2.7 % (ref 18–48)
LYMPHOCYTES NFR BLD: 4.5 % (ref 18–48)
MAGNESIUM SERPL-MCNC: 2.5 MG/DL (ref 1.6–2.6)
MCH RBC QN AUTO: 35.4 PG (ref 27–31)
MCH RBC QN AUTO: 35.7 PG (ref 27–31)
MCHC RBC AUTO-ENTMCNC: 36.9 G/DL (ref 32–36)
MCHC RBC AUTO-ENTMCNC: 37.1 G/DL (ref 32–36)
MCV RBC AUTO: 96 FL (ref 82–98)
MCV RBC AUTO: 96 FL (ref 82–98)
MICROSCOPIC COMMENT: ABNORMAL
MONOCYTES # BLD AUTO: 0.5 K/UL (ref 0.3–1)
MONOCYTES # BLD AUTO: 0.7 K/UL (ref 0.3–1)
MONOCYTES NFR BLD: 2.5 % (ref 4–15)
MONOCYTES NFR BLD: 3.5 % (ref 4–15)
NEUTROPHILS # BLD AUTO: 18.5 K/UL (ref 1.8–7.7)
NEUTROPHILS # BLD AUTO: 19 K/UL (ref 1.8–7.7)
NEUTROPHILS NFR BLD: 89.4 % (ref 38–73)
NEUTROPHILS NFR BLD: 92.1 % (ref 38–73)
NITRITE UR QL STRIP: NEGATIVE
NON-SQ EPI CELLS #/AREA URNS AUTO: 1 /HPF
NRBC BLD-RTO: 0 /100 WBC
NRBC BLD-RTO: 0 /100 WBC
OSMOLALITY UR: 327 MOSM/KG (ref 50–1200)
PH UR STRIP: 6 [PH] (ref 5–8)
PHOSPHATE SERPL-MCNC: 6.6 MG/DL (ref 2.7–4.5)
PHOSPHATIDYLETHANOL (PETH): 107 NG/ML
PLATELET # BLD AUTO: 62 K/UL (ref 150–350)
PLATELET # BLD AUTO: 68 K/UL (ref 150–350)
PMV BLD AUTO: 12.4 FL (ref 9.2–12.9)
PMV BLD AUTO: 13.1 FL (ref 9.2–12.9)
POCT GLUCOSE: 119 MG/DL (ref 70–110)
POCT GLUCOSE: 159 MG/DL (ref 70–110)
POCT GLUCOSE: 171 MG/DL (ref 70–110)
POTASSIUM SERPL-SCNC: 4.2 MMOL/L (ref 3.5–5.1)
POTASSIUM SERPL-SCNC: 4.5 MMOL/L (ref 3.5–5.1)
PROT SERPL-MCNC: 5.5 G/DL (ref 6–8.4)
PROT UR QL STRIP: ABNORMAL
PROTHROMBIN TIME: 24.1 SEC (ref 9–12.5)
RBC # BLD AUTO: 2.26 M/UL (ref 4–5.4)
RBC # BLD AUTO: 2.3 M/UL (ref 4–5.4)
RBC #/AREA URNS AUTO: >100 /HPF (ref 0–4)
SODIUM SERPL-SCNC: 123 MMOL/L (ref 136–145)
SODIUM SERPL-SCNC: 126 MMOL/L (ref 136–145)
SODIUM UR-SCNC: <20 MMOL/L (ref 20–250)
SP GR UR STRIP: 1.01 (ref 1–1.03)
URN SPEC COLLECT METH UR: ABNORMAL
WBC # BLD AUTO: 20.61 K/UL (ref 3.9–12.7)
WBC # BLD AUTO: 20.65 K/UL (ref 3.9–12.7)
WBC #/AREA URNS AUTO: 12 /HPF (ref 0–5)

## 2019-06-29 PROCEDURE — 99233 SBSQ HOSP IP/OBS HIGH 50: CPT | Mod: ,,, | Performed by: INTERNAL MEDICINE

## 2019-06-29 PROCEDURE — 80048 BASIC METABOLIC PNL TOTAL CA: CPT

## 2019-06-29 PROCEDURE — 25000003 PHARM REV CODE 250: Performed by: HOSPITALIST

## 2019-06-29 PROCEDURE — 36415 COLL VENOUS BLD VENIPUNCTURE: CPT

## 2019-06-29 PROCEDURE — 85610 PROTHROMBIN TIME: CPT

## 2019-06-29 PROCEDURE — 81001 URINALYSIS AUTO W/SCOPE: CPT

## 2019-06-29 PROCEDURE — 84100 ASSAY OF PHOSPHORUS: CPT

## 2019-06-29 PROCEDURE — 20600001 HC STEP DOWN PRIVATE ROOM

## 2019-06-29 PROCEDURE — 84300 ASSAY OF URINE SODIUM: CPT

## 2019-06-29 PROCEDURE — P9047 ALBUMIN (HUMAN), 25%, 50ML: HCPCS | Mod: JG | Performed by: HOSPITALIST

## 2019-06-29 PROCEDURE — 80053 COMPREHEN METABOLIC PANEL: CPT

## 2019-06-29 PROCEDURE — 99222 1ST HOSP IP/OBS MODERATE 55: CPT | Mod: ,,, | Performed by: INTERNAL MEDICINE

## 2019-06-29 PROCEDURE — 99233 PR SUBSEQUENT HOSPITAL CARE,LEVL III: ICD-10-PCS | Mod: ,,, | Performed by: HOSPITALIST

## 2019-06-29 PROCEDURE — 83735 ASSAY OF MAGNESIUM: CPT

## 2019-06-29 PROCEDURE — S4991 NICOTINE PATCH NONLEGEND: HCPCS | Performed by: HOSPITALIST

## 2019-06-29 PROCEDURE — 83935 ASSAY OF URINE OSMOLALITY: CPT

## 2019-06-29 PROCEDURE — 94761 N-INVAS EAR/PLS OXIMETRY MLT: CPT

## 2019-06-29 PROCEDURE — 99222 PR INITIAL HOSPITAL CARE,LEVL II: ICD-10-PCS | Mod: ,,, | Performed by: INTERNAL MEDICINE

## 2019-06-29 PROCEDURE — 99233 SBSQ HOSP IP/OBS HIGH 50: CPT | Mod: ,,, | Performed by: HOSPITALIST

## 2019-06-29 PROCEDURE — 99233 PR SUBSEQUENT HOSPITAL CARE,LEVL III: ICD-10-PCS | Mod: ,,, | Performed by: INTERNAL MEDICINE

## 2019-06-29 PROCEDURE — 85025 COMPLETE CBC W/AUTO DIFF WBC: CPT

## 2019-06-29 PROCEDURE — 25000003 PHARM REV CODE 250: Performed by: INTERNAL MEDICINE

## 2019-06-29 PROCEDURE — 63600175 PHARM REV CODE 636 W HCPCS: Performed by: HOSPITALIST

## 2019-06-29 RX ORDER — CIPROFLOXACIN 250 MG/1
250 TABLET, FILM COATED ORAL EVERY 24 HOURS
Status: DISCONTINUED | OUTPATIENT
Start: 2019-06-30 | End: 2019-07-02

## 2019-06-29 RX ORDER — SODIUM BICARBONATE 650 MG/1
1300 TABLET ORAL 2 TIMES DAILY
Status: DISCONTINUED | OUTPATIENT
Start: 2019-06-29 | End: 2019-07-05

## 2019-06-29 RX ADMIN — PANTOPRAZOLE SODIUM 40 MG: 40 TABLET, DELAYED RELEASE ORAL at 03:06

## 2019-06-29 RX ADMIN — RIFAXIMIN 550 MG: 550 TABLET ORAL at 09:06

## 2019-06-29 RX ADMIN — MIDODRINE HYDROCHLORIDE 15 MG: 5 TABLET ORAL at 03:06

## 2019-06-29 RX ADMIN — FOLIC ACID 1 MG: 1 TABLET ORAL at 09:06

## 2019-06-29 RX ADMIN — ALBUMIN (HUMAN) 25 G: 25 SOLUTION INTRAVENOUS at 09:06

## 2019-06-29 RX ADMIN — OCTREOTIDE ACETATE 100 MCG: 100 INJECTION, SOLUTION INTRAVENOUS; SUBCUTANEOUS at 03:06

## 2019-06-29 RX ADMIN — LACTULOSE 45 G: 20 SOLUTION ORAL at 03:06

## 2019-06-29 RX ADMIN — PANTOPRAZOLE SODIUM 40 MG: 40 TABLET, DELAYED RELEASE ORAL at 06:06

## 2019-06-29 RX ADMIN — OCTREOTIDE ACETATE 100 MCG: 100 INJECTION, SOLUTION INTRAVENOUS; SUBCUTANEOUS at 06:06

## 2019-06-29 RX ADMIN — SODIUM BICARBONATE 650 MG TABLET 1300 MG: at 05:06

## 2019-06-29 RX ADMIN — CIPROFLOXACIN HYDROCHLORIDE 500 MG: 500 TABLET, FILM COATED ORAL at 09:06

## 2019-06-29 RX ADMIN — PROMETHAZINE HYDROCHLORIDE 12.5 MG: 12.5 TABLET ORAL at 01:06

## 2019-06-29 RX ADMIN — ACETAMINOPHEN 325 MG: 325 TABLET ORAL at 05:06

## 2019-06-29 RX ADMIN — TRAMADOL HYDROCHLORIDE 50 MG: 50 TABLET, FILM COATED ORAL at 01:06

## 2019-06-29 RX ADMIN — NICOTINE 1 PATCH: 21 PATCH, EXTENDED RELEASE TRANSDERMAL at 03:06

## 2019-06-29 RX ADMIN — OCTREOTIDE ACETATE 100 MCG: 100 INJECTION, SOLUTION INTRAVENOUS; SUBCUTANEOUS at 09:06

## 2019-06-29 RX ADMIN — Medication 100 MG: at 09:06

## 2019-06-29 RX ADMIN — LACTULOSE 45 G: 20 SOLUTION ORAL at 09:06

## 2019-06-29 RX ADMIN — MIDODRINE HYDROCHLORIDE 15 MG: 5 TABLET ORAL at 09:06

## 2019-06-29 NOTE — CONSULTS
[facility]  Critical Care Medicine  Consult Note    Patient Name: Destinee Gallagher  MRN: [id number]  Admission Date: 2019  Hospital Length of Stay: 3 days  Code Status: Full Code  Attending Physician: Marisa Banks MD   Primary Care Provider: Harsha Larson   Principal Problem: Acute liver failure without hepatic coma    Consults  Subjective:     HPI:  38F with hx EtOH cirrhosis, hx EtOH abuse disorder/dependence, hx SBP transferred to [facility] for liver transplantation eval / hepatology consultation in the setting of decompensated cirrhosis. Admitted on  with mild encephalopathy, sodium of 118, MELD 35. Critical Care Medicine was consulted on  after pt had 2x episodes of small volume emesis (100-200 cc) with coffee grounds concerning for GIB as well as change in pt's mental status and hypotension. Patient was deemed stable at that time to remain on the floor. Critical care medicine was contacted again on  for rising Cr with MELD up to 40 with possible need for IV pressors. After evaluation by nephrology, decision was made that patient currently does not need pressors or ICU admission.    Hospital/ICU Course:  No notes on file    Past Medical History:   Diagnosis Date    Acute alcoholic hepatitis 2019    Acute liver failure without hepatic coma 2019    Alcohol use disorder, severe, dependence 2019    Coagulopathy 2019    Hepatic encephalopathy 2019    Hyponatremia 2019    Thrombocytopenia due to hypersplenism 2019               Past Surgical History:   Procedure Laterality Date     SECTION        CHOLECYSTECTOMY                   Review of patient's allergies indicates:   Allergen Reactions    Zofran [ondansetron hcl (pf)]         headache             Family History      None                Tobacco Use    Smoking status: Current Every Day Smoker       Packs/day: 1.00       Years: 15.00        Pack years: 15.00       Types: Cigarettes   Substance and Sexual Activity    Alcohol use: Not Currently       Comment: 6/1/19 last drink    Drug use: Never    Sexual activity: Yes       Partners: Female       Birth control/protection: None      Review of Systems   Constitutional: Negative for chills, diaphoresis and fever.   HENT: Negative for congestion, rhinorrhea, sneezing and sore throat.    Eyes: Negative for photophobia and visual disturbance.   Respiratory: Negative for cough, chest tightness and shortness of breath.    Cardiovascular: Negative for chest pain, palpitations and leg swelling.   Gastrointestinal: Positive for abdominal distention. Negative for abdominal pain, constipation, diarrhea, nausea and vomiting.   Genitourinary: Negative for dysuria.   Musculoskeletal: Negative for back pain.   Neurological: Negative for light-headedness and headaches.   Psychiatric/Behavioral: Negative for self-injury, sleep disturbance and suicidal ideas. The patient is not nervous/anxious.       Objective:      Vital Signs (Most Recent):  Temp: 97.6 °F (36.4 °C) (06/29/19 1534)  Pulse: 77 (06/29/19 1542)  Resp: 18 (06/29/19 1534)  BP: 112/63 (06/29/19 1534)  SpO2: 97 % (06/29/19 1534) Vital Signs (24h Range):  Temp:  [97.1 °F (36.2 °C)-98 °F (36.7 °C)] 97.6 °F (36.4 °C)  Pulse:  [76-85] 77  Resp:  [16-18] 18  SpO2:  [91 %-98 %] 97 %  BP: ()/(52-64) 112/63   Weight: 60.3 kg (133 lb)  Body mass index is 22.13 kg/m².        Intake/Output Summary (Last 24 hours) at 6/29/2019 1703  Last data filed at 6/29/2019 1100      Gross per 24 hour   Intake 240 ml   Output 1001 ml   Net -761 ml         Physical Exam   Constitutional: She is oriented to person, place, and time. She appears well-developed and well-nourished. No distress.   Jaundiced   HENT:   Head: Normocephalic and atraumatic.   Nose: Nose normal.   Eyes: Pupils are equal, round, and reactive to light. EOM are normal.   Cardiovascular: Normal rate, regular  rhythm and intact distal pulses.   Pulmonary/Chest: Effort normal. No stridor. No respiratory distress.   Abdominal: Soft. She exhibits no distension. There is no tenderness.   +ascites    Neurological: She is oriented to person, place, and time. No cranial nerve deficit.   Skin: Skin is warm and dry. Capillary refill takes less than 2 seconds. She is not diaphoretic.   Psychiatric: She has a normal mood and affect. Her behavior is normal. Judgment and thought content normal.   Vitals reviewed.        Vents:      Lines/Drains/Airways            Drain                          Urethral Catheter 06/28/19 1555 Latex 16 Fr. 1 day                   Peripheral Intravenous Line                          Midline Catheter Insertion/Assessment  - Single Lumen 06/26/19 1125 Right brachial vein 18g x 10cm 3 days           Peripheral IV - Single Lumen 06/28/19 1115 20 G;1 3/4 in Left Upper Arm 1 day                  Significant Labs:     CBC/Anemia Profile:        Recent Labs   Lab 06/28/19  1704 06/29/19  0353 06/29/19  1532   WBC 20.38* 20.61* 20.65*   HGB 8.0* 8.2* 8.0*   HCT 21.8* 22.1* 21.7*   PLT 54* 62* 68*   MCV 96 96 96   RDW 16.9* 16.7* 16.8*         Chemistries:         Recent Labs   Lab 06/28/19  0424 06/28/19  1704 06/29/19  0353 06/29/19  1532   *  119* 118* 123* 126*   K 4.2  4.2 4.2 4.5 4.2   CL 89*  89* 88* 92* 95   CO2 16*  16* 18* 16* 15*   BUN 72*  72* 74* 73* 70*   CREATININE 2.2*  2.2* 2.9* 3.0* 2.8*   CALCIUM 8.9  8.9 9.4 9.2 9.8   ALBUMIN 2.6*  --  3.1*  --    PROT 5.0*  --  5.5*  --    BILITOT 24.9*  --  27.3*  --    ALKPHOS 245*  --  254*  --    ALT 12  --  12  --    AST 88*  --  97*  --    MG 2.1  --  2.5  --    PHOS 6.3*  --  6.6*  --           ABG  No results for input(s): PH, PO2, PCO2, HCO3, BE in the last 168 hours.  Assessment/Plan:     GI  Alcoholic cirrhosis of liver with ascites  Decompensated Cirrhosis  Hyponatremia  Oliguria  Coffee Ground Emesis  Hepatic  Encephalopathy    Assessment:  Admitted with decompensated cirrhosis for Hepatology / Transplant Evaluation. Course c/b persistent hyponatremia and refractory ascites despite diuretics (currently being held - suspected GIB, sodium previously responsive to hypertonic saline at OSH), coffee ground emesis (without hemodynamic instability), suspected HRS. MICU now consulted for rising Cr with MELD up to 40 with possible need for IV pressors. After evaluation by nephrology, decision was made that patient currently does not need pressors or ICU admission.    Currently hemodynamically stable. Creatine improving with adequate UOP. Continue midodrine and octreotide for blood pressure support.     Recommendations  - Continue current therapies. If pt continues to decline or becomes unstable, please do not hesitate the re-consult       Thank you for your consult. I will sign off. Please contact us if you have any additional questions.     Mecca Stewart MD  Critical Care Medicine  [facility]

## 2019-06-29 NOTE — ASSESSMENT & PLAN NOTE
Decompensated Cirrhosis  Hyponatremia  Oliguria  Coffee Ground Emesis  Hepatic Encephalopathy    Assessment:  Admitted with decompensated cirrhosis for Hepatology / Transplant Evaluation. Course c/b persistent hyponatremia and refractory ascites despite diuretics (currently being held - suspected GIB, sodium previously responsive to hypertonic saline at OSH), coffee ground emesis (without hemodynamic instability), suspected HRS. MICU now consulted for rising Cr with MELD up to 40 with possible need for IV pressors. After evaluation by nephrology, decision was made that patient currently does not need pressors or ICU admission.    Currently hemodynamically stable. Creatine improving with adequate UOP. Continue midodrine and octreotide for blood pressure support.     Recommendations  - Continue current therapies. If pt continues to decline or becomes unstable, please do not hesitate the re-consult

## 2019-06-29 NOTE — SUBJECTIVE & OBJECTIVE
Interval History: Patient doing better per . She is showering on her own and less confused today. MELD of 40 noted with low Bps. No further episodes of coffee ground emesis reported.    Current Facility-Administered Medications   Medication    acetaminophen tablet 325 mg    albumin human 25% bottle 25 g    [START ON 6/30/2019] ciprofloxacin HCl tablet 250 mg    dextrose 10% (D10W) Bolus    dextrose 10% (D10W) Bolus    folic acid tablet 1 mg    glucagon (human recombinant) injection 1 mg    glucose chewable tablet 16 g    glucose chewable tablet 24 g    lactulose 20 gram/30 mL solution Soln 45 g    lidocaine 5 % patch 2 patch    midodrine tablet 15 mg    nicotine 21 mg/24 hr 1 patch    octreotide injection 100 mcg    pantoprazole EC tablet 40 mg    polyethylene glycol packet 17 g    promethazine tablet 12.5 mg    ramelteon tablet 8 mg    rifAXIMin tablet 550 mg    sodium chloride 0.9% flush 5 mL    thiamine tablet 100 mg    traMADol tablet 50 mg       Objective:     Vital Signs (Most Recent):  Temp: 97.6 °F (36.4 °C) (06/29/19 0717)  Pulse: 80 (06/29/19 0729)  Resp: 18 (06/29/19 0717)  BP: (!) 92/52 (06/29/19 0717)  SpO2: (!) 94 % (06/29/19 0717) Vital Signs (24h Range):  Temp:  [97.1 °F (36.2 °C)-98 °F (36.7 °C)] 97.6 °F (36.4 °C)  Pulse:  [76-85] 80  Resp:  [16-18] 18  SpO2:  [91 %-99 %] 94 %  BP: ()/(48-64) 92/52     Weight: 60.3 kg (133 lb) (06/27/19 1415)  Body mass index is 22.13 kg/m².    Physical Exam     Patient not seen as she was in shower.    MELD-Na score: 40 at 6/29/2019  3:53 AM  MELD score: 40 at 6/29/2019  3:53 AM  Calculated from:  Serum Creatinine: 3.0 mg/dL at 6/29/2019  3:53 AM  Serum Sodium: 123 mmol/L (Rounded to 125 mmol/L) at 6/29/2019  3:53 AM  Total Bilirubin: 27.3 mg/dL at 6/29/2019  3:53 AM  INR(ratio): 2.5 at 6/29/2019  3:53 AM  Age: 38 years    Significant Labs:  CBC:   Recent Labs   Lab 06/29/19  0353   WBC 20.61*   RBC 2.30*   HGB 8.2*   HCT 22.1*    PLT 62*     CMP:   Recent Labs   Lab 06/29/19  0353   *   CALCIUM 9.2   ALBUMIN 3.1*   PROT 5.5*   *   K 4.5   CO2 16*   CL 92*   BUN 73*   CREATININE 3.0*   ALKPHOS 254*   ALT 12   AST 97*   BILITOT 27.3*     Coagulation:   Recent Labs   Lab 06/29/19  0353   INR 2.5*

## 2019-06-29 NOTE — PROGRESS NOTES
Progress Note  [facility]  [facility], [place]       Patient Name: Destinee Gallagher  MRN:  [id number]  Hospital Medicine Team: [facility] [place] [facility] Marisa Banks MD  Date of Admission:  6/26/2019     Length of Stay:  LOS: 3 days   Expected Discharge Date: 7/3/2019  Principal Problem:  Acute liver failure without hepatic coma     Subjective:     Interval History/Overnight Events:      Doing well today clinically , with no acute distress. No acute problems today. No further coffee ground emesis  Cr increasing to 3 today with low BPs, Map barely 65. Patient with HRS  Nephrology consulted. ICU consulted for IV pressors in setting of HRS and low BPs. Nephrology recommended current regimen and no IV pressors at this time   MELD increased to 40     Patient remains very low threshold to ICU admission     albumin human 25%  25 g Intravenous BID    [START ON 6/30/2019] ciprofloxacin HCl  250 mg Oral Daily    folic acid  1 mg Oral Daily    lactulose  45 g Oral TID    lidocaine  2 patch Transdermal Q24H    midodrine  15 mg Oral TID    nicotine  1 patch Transdermal Daily    octreotide  100 mcg Intravenous Q8H    pantoprazole  40 mg Oral BID AC    rifAXImin  550 mg Oral BID    sodium bicarbonate  1,300 mg Oral BID    thiamine  100 mg Oral Daily           acetaminophen, Dextrose 10% Bolus, Dextrose 10% Bolus, glucagon (human recombinant), glucose, glucose, polyethylene glycol, promethazine, ramelteon, sodium chloride 0.9%, traMADol    Review of Systems   Constitutional: Negative for chills, fatigue, fever.   HENT: Negative for sore throat, trouble swallowing.    Eyes: Negative for photophobia, visual disturbance.   Respiratory: Negative for cough, shortness of breath.    Cardiovascular: Negative for chest pain, palpitations, leg swelling.   Gastrointestinal: Negative for abdominal pain, constipation, diarrhea, nausea, vomiting.   Endocrine: Negative for cold intolerance, heat intolerance.    Genitourinary: Negative for dysuria, frequency.   Musculoskeletal: Negative for arthralgias, myalgias.   Skin: Negative for rash, wound, erythema   Neurological: Negative for dizziness, syncope, weakness, light-headedness.   Psychiatric/Behavioral: Negative for confusion, hallucinations, anxiety  All other systems reviewed and are negative.    Objective:     Temp:  [97.1 °F (36.2 °C)-98 °F (36.7 °C)]   Pulse:  [76-85]   Resp:  [16-18]   BP: ()/(52-64)   SpO2:  [91 %-98 %]       Physical Exam:  Constitutional: appears older than stated age, chronically ill looking,  non-distressed, not diaphoretic.   HENT: NC/AT, external ears normal, oropharynx clear, MMM w/o exudates.   Eyes: PERRL, EOMI, conjunctiva normal, no discharge b/l, +scleral icterus   Neck: normal ROM, supple  CV: RRR, no m/r/g, no carotid bruits, +2 peripheral pulses.  Pulmonary/Chest wall: Breathing comfortably w/o distress, CTAB, no w/r/r, no crackles.  Decreased breath sounds at lung bases  GI: Soft, non-tender, (+) BS, (+) BM   +distended, tender palpation    Musculoskeletal: Normal ROM, no atrophy,  + pitting edema in LE bilaterally   Neurological: AAO x 4, CN II-XI in tact, nl sensation, nl strength/tone     Skin: warm, dry   + jaundice, + significant spider angiomas, +bruising   Psych: normal mood and affect, normal behavior, thought content and judgement.    Labs:    Chemistries:   Recent Labs   Lab 06/27/19  0403  06/28/19  0424 06/28/19  1704 06/29/19  0353 06/29/19  1532   *   < > 119*  119* 118* 123* 126*   K 4.2   < > 4.2  4.2 4.2 4.5 4.2   CL 87*   < > 89*  89* 88* 92* 95   CO2 18*   < > 16*  16* 18* 16* 15*   BUN 71*   < > 72*  72* 74* 73* 70*   CREATININE 1.6*   < > 2.2*  2.2* 2.9* 3.0* 2.8*   CALCIUM 9.3   < > 8.9  8.9 9.4 9.2 9.8   PROT 5.1*  --  5.0*  --  5.5*  --    BILITOT 25.4*  --  24.9*  --  27.3*  --    ALKPHOS 318*  --  245*  --  254*  --    ALT 15  --  12  --  12  --    AST 93*  --  88*  --  97*  --    MG  2.2  --  2.1  --  2.5  --    PHOS 6.5*  --  6.3*  --  6.6*  --     < > = values in this interval not displayed.        WBC:   Recent Labs   Lab 06/28/19  0008 06/28/19  0425 06/28/19  1704 06/29/19  0353 06/29/19  1532   WBC 20.05* 19.46* 20.38* 20.61* 20.65*     Bands:     CBC/Anemia Labs: Coags:    Recent Labs   Lab 06/28/19  1704 06/29/19  0353 06/29/19  1532   WBC 20.38* 20.61* 20.65*   HGB 8.0* 8.2* 8.0*   HCT 21.8* 22.1* 21.7*   PLT 54* 62* 68*   MCV 96 96 96   RDW 16.9* 16.7* 16.8*    Recent Labs   Lab 06/27/19  0403 06/28/19  0425 06/29/19  0353   INR 2.4* 2.4* 2.5*        POCT Glucose: HbA1c:    Recent Labs   Lab 06/27/19  2202 06/27/19  2250 06/28/19  0748 06/28/19  1723 06/29/19  0028 06/29/19  0613   POCTGLUCOSE 67* 156* 100 90 171* 159*    No results found for: HGBA1C       Assessment and Plan     Hospital Course:    Ms. Destinee Gallagher is a 38 y.o. female who presented to [facility] on 6/26/2019 with ETOH hepatitis and acute liver failure , associated with hepatic encephalopathy, Severe hyponatremia with sodium level of 117, ascites, coagulopathy and thrombocytopenia, as well as development of upper GI bleed on 06/27/2019, and  ZULAY on 06/27/2019    Active Hospital Problems    Diagnosis  POA    *Acute liver failure without hepatic coma [K72.00]  Yes    Coffee ground emesis [K92.0]  Yes    Upper GI bleed [K92.2]  No    Acute blood loss anemia [D62]  No    Hepatorenal syndrome [K76.7]  No    Acute alcoholic hepatitis [K70.10]  Yes    Ascites due to alcoholic hepatitis [K70.11]  Yes     Has had hx of SBP , as per family       Hepatic encephalopathy [K72.90]  Yes    Coagulopathy [D68.9]  Yes    Hyponatremia [E87.1]  Yes    Alcohol use disorder, severe, dependence [F10.20]  Yes    Macrocytic anemia [D53.9]  Yes    Leukocytosis [D72.829]  Yes    Hypotension [I95.9]  Yes    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     Acute liver failure without hepatic coma  Acute alcoholic hepatitis with ascites  Alcoholic cirrhosis of liver with ascites     MELD-Na score: 39 at 6/29/2019  3:32 PM  MELD score: 39 at 6/29/2019  3:32 PM  Calculated from:  Serum Creatinine: 2.8 mg/dL at 6/29/2019  3:32 PM  Serum Sodium: 126 mmol/L at 6/29/2019  3:32 PM  Total Bilirubin: 27.3 mg/dL at 6/29/2019  3:53 AM  INR(ratio): 2.5 at 6/29/2019  3:53 AM  Age: 38 years     - decompensation due to recent alcohol binge drinking.  Meld score of 35 on admission  - hepatology consulted.  Obtaining financial approval for initiation of inpatient liver transplant evaluation  - PETH ordered and pending  - viral and autoimmune markers negative  - serology for liver transplant evaluation -ordered and pending.  - LVP on 06/26/2019,  3.4 L drained.  No SBP found on ascitic fluid.  Needs at least p.o. ciprofloxacin prophylaxis given history of SBP in the past  - treat hypotension with midodrine.   - p.o. Lactulose and PO rifaximin for hepatic encephalopathy  - IV vitamin K for coagulopathy  - Psychiatry consulted on the case.  - patient with decompensation on 06/27/2019 and some of the pre liver transplant testing and imaging was deferred that day. Restarted on 6/28. Needs GYN, ID, mammo, LTS, possibly an EGD for txp eval as well     Upper GI bleed  Acute blood loss anemia  - patient with coffee-ground emesis on 06/27/2019 with hemoglobin decreased from 10-8.8.  May be due to portal hypertensive gastropathy, however given history of cirrhosis, there is definitely concern for variceal bleeding  - patient started on IV ceftriaxone for SBP prophylaxis in lieu of GI bleeding  - started on IV octreotide drip and  IV PPI b.i.d. On 6/27. Planned  for EGD on 06/28/2019 but procedure was  deferred due to high chance of decompensation  - will plan to transfuse for hemoglobin likely <8  - 1 unit of FFP transfusion on 06/27/2019 given significant coagulopathy with INR greater than 2 and active bleeding  - IV oct gtt and IV ppi stopped on 6/28 , IV ceftriaxone de-escalated to Cipro prophylaxis.  Plan to monitor for further bleeding and reassess for necessity for EGD    Hepatorenal syndrome  - creatinine increasing to 2.1 on 06/27/2019.  Creatinine on admission 1.3, patient was receiving diuretics at outside hospital.  Urine sodium is less than 20, with ascites and hypotension.  Which is highly highly concerning for hepatorenal syndrome  - continue max dose midodrine 15 t.i.d.  - patient has been on subcu octreotide and transition to octreotide drip for upper GI bleeding  - ICU consulted on 06/27/2019 given tenuous blood pressures, worsening encephalopathy, worsening renal failure, significant hyponatremia,  upper GI bleeding.  Patient will need close monitoring and possibly transfer for pressors to the ICU, when deemed appropriate by the ICU team  - cont midodrine, IV octreotide and albumin.   - 6/29 - Nephrology consulted. ICU consulted for IV pressors in setting of HRS and low BPs. Nephrology recommended current regimen and no IV pressors at this time     Acute on Chronic hepatic encephalopathy  - PO lactulose  - PO rifaximin  - monitor BMs, goal of 3-4 . Monitor mental status   - development of slightly worsening hepatic encephalopathy on 06/27/2019.  Likely due to development of upper GI bleed.     Alcoh ol use disorder, severe, dependence  - with history of alcoholic cirrhosis and alcoholic hepatitis leading to acute liver failure  - urine toxicology ordered  - PETH ordered  - Psychiatry consulted on the case- deemed patient high risk  - Patient explicitly stated that she does not need Alcohol Rehab . She stated that her current problem is due to alcohol binge earlier in May due to problems in  personal life and associated anxiety with that.  As per , patient has underwent multiple rehabs in the past and relapsed.  The longest patient could ever go without relapsing was 1 year in early 2000s     Hyponatremia  - sodium of 118 on admission, patient has been getting diuretics at outside hospital.  Likely hypervolemic hyponatremia in setting of decompensated cirrhosis and volume overload  - hold diuretics at this time  - given hypotension hyponatremia and relatively normal potassium, AM cortisol level checked and noted to be at 7.8.   - cosyntropin stim test to evaluated for adrenal insufficiency - test was not optimally performed and showed unlikely adrenal insufficiency   - fluid restriction to 1 L per day     Hypotension  - likely due to liver failure and decompensated hepatic cirrhosis  - patient started on midodrine 5 mg t.i.d.--> increased to 15 t.i.d. given very tenuous blood pressures  -very low threshold for  transfer to ICU for IV pressors     Leukocytosis  - WBC count of 23 on admission.  Very likely due to alcoholic hepatitis.  WBC of 21 down trending from 20/6 at outside hospital  - no SBP and cx remains ngtd  - antibiotics as above  - continue monitor     Anasarca  - likely due to liver failure and decompensated hepatic cirrhosis  - 2D echo with 65 % and Estimated PA pressure is at least 36 mmHg.     Macrocytic Anemia  Thrombocytopenia  Coagulopathy  - hemoglobin of 10 with MCV of 100 on admission.  - platelets of 54 on admission.  - INR of 2.4 on admission likely due to liver failure  - administer IV vitamin K x3 days for coagulopathy-- INR remains 2.4 despite vitamin K  - monitor platelets and hemoglobin daily  - DIC and hemolysis studies negative     Tobacco use disorder  - patient is a current every day smoker  - will assess for necessity of nicotine patch     Diet:  Low Na, fluid restriction   GI PPx:  ppi  DVT PPx:  scds  Goals of Care:  full      High Risk Conditions:  Liver failure      Disposition:      MELD 40   Patient remains very high risk for quick decompensation and low threshold for transfer to ICU    Signing Physician:     Marisa Banks MD  Department of Hospital Medicine   [facility]  Pager [phone] [facility] [id number]  6/29/2019

## 2019-06-29 NOTE — ASSESSMENT & PLAN NOTE
38 year old female with a history of alcoholic cirrhosis who hepatology is following for decompensated cirrhosis and liver transplant evaluation. High MELD of 40. Undergoing transplant evaluation, though she is a high psycho-social risk. Kidney function worsened and low Bps, critical care and nephrology consulted.    Recommendations:  --Daily CMP, CBC, and INR  --Strict I/O and avoid nephrotoxins  --F/U infectious workup  --Continue antibiotics  --Continue lactulose, rifaximin  --Continue folic acid, thiamine  --Continue midodrine, octreotide, and albumin  --Undergoing transplant eval  -- Appreciate nephrology recs, critical care recs

## 2019-06-29 NOTE — PLAN OF CARE
Problem: Adult Inpatient Plan of Care  Goal: Plan of Care Review  Outcome: Ongoing (interventions implemented as appropriate)  POC reviewed with patient. AAOx4, VSS. Moore maintained for more accurate output recording. Up with assist, remains free of falls, patient and  educated on safety and importance of bed alarm to prevent falls. PRN Phenergan given x 1 for nausea, PRN Tramadol given x 1 for pain. No evidence of distress, safety maintained, hourly rounding performed. Will continue to monitor.

## 2019-06-29 NOTE — ASSESSMENT & PLAN NOTE
Decompensated Cirrhosis  Hyponatremia  Oliguria  Coffee Ground Emesis  Hepatic Encephalopathy    Assessment:  Admitted with decompensated cirrhosis for Hepatology / Transplant Evaluation. Course c/b persistent hyponatremia and refractory ascites despite diuretics (currently being held - suspected GIB, sodium previously responsive to hypertonic saline at OSH), coffee ground emesis (without hemodynamic instability), suspected HRS. MICU now consulted for rising Cr with MELD up to 40 with possible need for IV pressors.     Currently hemodynamically stable. Creatine improving with adequate UOP. Continue midodrine and octreotide for blood pressure support.     Recommendations  - Continue current therapies. If pt continues to decline or becomes unstable, please do not hesitate the re-consult

## 2019-06-29 NOTE — CONSULTS
Consult received - cirrhotic patient with ZULAY suspect HRS c/b chronic hyponatremia. Nephrology evaluating to determine need for HRS protocol (MAP >85 mmHg which would require pressors).     Dispo pending Nephrology recommendations.     Travis Huggins MD   Critical Care Medicine - [place] [id number]  PGY-2 Internal Medicine  [phone]

## 2019-06-29 NOTE — SUBJECTIVE & OBJECTIVE
Past Medical History:   Diagnosis Date    Acute alcoholic hepatitis 2019    Acute liver failure without hepatic coma 2019    Alcohol use disorder, severe, dependence 2019    Coagulopathy 2019    Hepatic encephalopathy 2019    Hyponatremia 2019    Thrombocytopenia due to hypersplenism 2019       Past Surgical History:   Procedure Laterality Date     SECTION      CHOLECYSTECTOMY         Review of patient's allergies indicates:   Allergen Reactions    Zofran [ondansetron hcl (pf)]      headache       Family History     None        Tobacco Use    Smoking status: Current Every Day Smoker     Packs/day: 1.00     Years: 15.00     Pack years: 15.00     Types: Cigarettes   Substance and Sexual Activity    Alcohol use: Not Currently     Comment: 19 last drink    Drug use: Never    Sexual activity: Yes     Partners: Female     Birth control/protection: None      Review of Systems   Constitutional: Negative for chills, diaphoresis and fever.   HENT: Negative for congestion, rhinorrhea, sneezing and sore throat.    Eyes: Negative for photophobia and visual disturbance.   Respiratory: Negative for cough, chest tightness and shortness of breath.    Cardiovascular: Negative for chest pain, palpitations and leg swelling.   Gastrointestinal: Positive for abdominal distention. Negative for abdominal pain, constipation, diarrhea, nausea and vomiting.   Genitourinary: Negative for dysuria.   Musculoskeletal: Negative for back pain.   Neurological: Negative for light-headedness and headaches.   Psychiatric/Behavioral: Negative for self-injury, sleep disturbance and suicidal ideas. The patient is not nervous/anxious.      Objective:     Vital Signs (Most Recent):  Temp: 97.6 °F (36.4 °C) (19 1534)  Pulse: 77 (19 1542)  Resp: 18 (19 1534)  BP: 112/63 (19 1534)  SpO2: 97 % (19 1534) Vital Signs (24h Range):  Temp:  [97.1 °F (36.2 °C)-98 °F (36.7 °C)]  97.6 °F (36.4 °C)  Pulse:  [76-85] 77  Resp:  [16-18] 18  SpO2:  [91 %-98 %] 97 %  BP: ()/(52-64) 112/63   Weight: 60.3 kg (133 lb)  Body mass index is 22.13 kg/m².      Intake/Output Summary (Last 24 hours) at 6/29/2019 1703  Last data filed at 6/29/2019 1100  Gross per 24 hour   Intake 240 ml   Output 1001 ml   Net -761 ml       Physical Exam   Constitutional: She is oriented to person, place, and time. She appears well-developed and well-nourished. No distress.   Jaundiced   HENT:   Head: Normocephalic and atraumatic.   Nose: Nose normal.   Eyes: Pupils are equal, round, and reactive to light. EOM are normal.   Cardiovascular: Normal rate, regular rhythm and intact distal pulses.   Pulmonary/Chest: Effort normal. No stridor. No respiratory distress.   Abdominal: Soft. She exhibits no distension. There is no tenderness.   +ascites    Neurological: She is oriented to person, place, and time. No cranial nerve deficit.   Skin: Skin is warm and dry. Capillary refill takes less than 2 seconds. She is not diaphoretic.   Psychiatric: She has a normal mood and affect. Her behavior is normal. Judgment and thought content normal.   Vitals reviewed.      Vents:     Lines/Drains/Airways     Drain                 Urethral Catheter 06/28/19 1555 Latex 16 Fr. 1 day          Peripheral Intravenous Line                 Midline Catheter Insertion/Assessment  - Single Lumen 06/26/19 1125 Right brachial vein 18g x 10cm 3 days         Peripheral IV - Single Lumen 06/28/19 1115 20 G;1 3/4 in Left Upper Arm 1 day              Significant Labs:    CBC/Anemia Profile:  Recent Labs   Lab 06/28/19  1704 06/29/19  0353 06/29/19  1532   WBC 20.38* 20.61* 20.65*   HGB 8.0* 8.2* 8.0*   HCT 21.8* 22.1* 21.7*   PLT 54* 62* 68*   MCV 96 96 96   RDW 16.9* 16.7* 16.8*        Chemistries:  Recent Labs   Lab 06/28/19  0424 06/28/19  1704 06/29/19  0353 06/29/19  1532   *  119* 118* 123* 126*   K 4.2  4.2 4.2 4.5 4.2   CL 89*  89* 88*  92* 95   CO2 16*  16* 18* 16* 15*   BUN 72*  72* 74* 73* 70*   CREATININE 2.2*  2.2* 2.9* 3.0* 2.8*   CALCIUM 8.9  8.9 9.4 9.2 9.8   ALBUMIN 2.6*  --  3.1*  --    PROT 5.0*  --  5.5*  --    BILITOT 24.9*  --  27.3*  --    ALKPHOS 245*  --  254*  --    ALT 12  --  12  --    AST 88*  --  97*  --    MG 2.1  --  2.5  --    PHOS 6.3*  --  6.6*  --

## 2019-06-29 NOTE — CARE UPDATE
"MD paged per pt request.  Pt , (pt's spouse) request nicotine patch none ordered for pt at this time.  2250- MD returned call, verified with patient states "I smoke 1.5 pack per day".    Ordered Nicotine patch 21 mg Q24hr    Returned to room to disconnect Albumin, pt in bathroom with only tubing connected. Spouse pulled tubing from Albumin bottle so that patient could "freely go to the bathroom."  Pt and spouse were educated on the importance on calling staff for assistance, especially when needing IV medication disconnected. Both voiced understanding.      "

## 2019-06-29 NOTE — PROGRESS NOTES
"[facility]  Hepatology  Progress Note    Patient Name: Destinee Gallagher  MRN: [id number]  Admission Date: 6/26/2019  Hospital Length of Stay: 3 days  Attending Provider: Marisa Banks MD   Primary Care Physician: Harsha Larson  Principal Problem:Acute liver failure without hepatic coma    Subjective:     Transplant status: No    HPI: 38 year old female with a history of alcoholic cirrhosis on who hepatology is being consulted for decompensated cirrhosis.    Per HPI:  "Presented to [facility] on 06/26/2019 as a transfer from  [facility] in [place], [place], for liver transplantation evaluation as well as hepatology evaluation. Patient was admitted to OSH 6/6 with abdominal pain, nausea, decreased oral intake, and visibly jaundice.  Over past 2.5 weeks there , pt has been diagnosed with and treated for SBP.  She has been continued on diuretics, but ascites has been resistant to diuretics , requiring multiple paracenteses (last LVP several days prior to transfer).  Most recent paracentesis did not meet criteria for SBP, and had no PMNs, but pt remained on Rocephin for leukocytosis of unclear source.  She has been encephalopathic but slowly improving with increased doses of Lactulose and Rifaxamin.  Pt has not improved with current treatment, thus requesting liver transplant evaluation, Case d/w Dr. Posadas, patient transferred to [facility]. MELD 35 upon presentation at [facility]. Last ETOH drink on 6/3     Upon presentation at [facility], patient was afebrile with systolic blood pressures in the 90s satting 95% on room air.  She was mildly encephalopathic, however was able to participate in conversation,  at bedside.  Meld 35 on presentation.  Labs notable for total bilirubin of 28, sodium of 118 (patient has been getting in diuretics at the outside hospital), INR of 2.4.  WBC of 23, platelets 54.      Upon further questioning regarding patient's disease process, she stated that she has had history " "of heavy alcohol use, most recently 1-2 pt of vodka per day.  Patient's last alcoholic drink was on 06/03/2019, approximately.  Patient did not state that she has been binge drinking, as noted above 2 pt of vodka per day, prior to her admission at outside hospital.  Patient also uses tobacco.  She stated that she was 1st diagnosed with alcoholic cirrhosis and alcoholic hepatitis in August 2018 and was advised to stop drinking at that time.  Patient has had multiple relapses since then.  It appears the patient has failed multiple alcohol rehabs in the past.  In the last couple of months patient has had recurrent problem with worsening ascites and abdominal distention."    Interval History:  Patient seen with  at bedside. Reports he last drink was on 6/1.  She has been to rehab multiple times but has always relapse.  Feels like this time will be different as she will follow instructions and has 2 kids to live for.    Interval History: Patient doing better per . She is showering on her own and less confused today. MELD of 40 noted with low Bps. No further episodes of coffee ground emesis reported.    Current Facility-Administered Medications   Medication    acetaminophen tablet 325 mg    albumin human 25% bottle 25 g    [START ON 6/30/2019] ciprofloxacin HCl tablet 250 mg    dextrose 10% (D10W) Bolus    dextrose 10% (D10W) Bolus    folic acid tablet 1 mg    glucagon (human recombinant) injection 1 mg    glucose chewable tablet 16 g    glucose chewable tablet 24 g    lactulose 20 gram/30 mL solution Soln 45 g    lidocaine 5 % patch 2 patch    midodrine tablet 15 mg    nicotine 21 mg/24 hr 1 patch    octreotide injection 100 mcg    pantoprazole EC tablet 40 mg    polyethylene glycol packet 17 g    promethazine tablet 12.5 mg    ramelteon tablet 8 mg    rifAXIMin tablet 550 mg    sodium chloride 0.9% flush 5 mL    thiamine tablet 100 mg    traMADol tablet 50 mg       Objective: "     Vital Signs (Most Recent):  Temp: 97.6 °F (36.4 °C) (06/29/19 0717)  Pulse: 80 (06/29/19 0729)  Resp: 18 (06/29/19 0717)  BP: (!) 92/52 (06/29/19 0717)  SpO2: (!) 94 % (06/29/19 0717) Vital Signs (24h Range):  Temp:  [97.1 °F (36.2 °C)-98 °F (36.7 °C)] 97.6 °F (36.4 °C)  Pulse:  [76-85] 80  Resp:  [16-18] 18  SpO2:  [91 %-99 %] 94 %  BP: ()/(48-64) 92/52     Weight: 60.3 kg (133 lb) (06/27/19 1415)  Body mass index is 22.13 kg/m².    Physical Exam     Patient not seen as she was in shower.    MELD-Na score: 40 at 6/29/2019  3:53 AM  MELD score: 40 at 6/29/2019  3:53 AM  Calculated from:  Serum Creatinine: 3.0 mg/dL at 6/29/2019  3:53 AM  Serum Sodium: 123 mmol/L (Rounded to 125 mmol/L) at 6/29/2019  3:53 AM  Total Bilirubin: 27.3 mg/dL at 6/29/2019  3:53 AM  INR(ratio): 2.5 at 6/29/2019  3:53 AM  Age: 38 years    Significant Labs:  CBC:   Recent Labs   Lab 06/29/19  0353   WBC 20.61*   RBC 2.30*   HGB 8.2*   HCT 22.1*   PLT 62*     CMP:   Recent Labs   Lab 06/29/19  0353   *   CALCIUM 9.2   ALBUMIN 3.1*   PROT 5.5*   *   K 4.5   CO2 16*   CL 92*   BUN 73*   CREATININE 3.0*   ALKPHOS 254*   ALT 12   AST 97*   BILITOT 27.3*     Coagulation:   Recent Labs   Lab 06/29/19  0353   INR 2.5*         Assessment/Plan:     Alcoholic cirrhosis of liver with ascites  38 year old female with a history of alcoholic cirrhosis who hepatology is following for decompensated cirrhosis and liver transplant evaluation. High MELD of 40. Undergoing transplant evaluation, though she is a high psycho-social risk. Kidney function worsened and low Bps, critical care and nephrology consulted.    Recommendations:  --Daily CMP, CBC, and INR  --Strict I/O and avoid nephrotoxins  --F/U infectious workup  --Continue antibiotics  --Continue lactulose, rifaximin  --Continue folic acid, thiamine  --Continue midodrine, octreotide, and albumin  --Undergoing transplant eval  -- Appreciate nephrology recs, critical care  recs                Thank you for your consult. I will follow-up with patient. Please contact us if you have any additional questions.    Manuel Fuentes MD  Hepatology  [facility]

## 2019-06-29 NOTE — PLAN OF CARE
"Problem: Adult Inpatient Plan of Care  Goal: Plan of Care Review  POC reviewed with patient and pt's spouse. VSS. A&Ox4 with delayed responses. At start of shift pt out of room via wheelchair per spouse. Pt had 5 BM's post lactulose dose.  Returned to room to disconnect Albumin, pt in bathroom with only tubing connected. Spouse pulled tubing from Albumin bottle so that patient could "freely go to the bathroom."  Pt and spouse were educated on the importance on calling staff for assistance, especially when needing IV medication disconnected. Both voiced understanding.  Pt started on Nicotine patch over night, tolerated well. Pt remained free of falls. Will continue to monitor.  Pt sitting on couch with uimeter bag on floor. Pt educated on increase chance of infection and proper placement. Pt voiced understanding.         "

## 2019-06-29 NOTE — CONSULTS
Consult received - cirrhotic patient with ZULAY suspect HRS c/b chronic hyponatremia. Nephrology evaluating to determine need for HRS protocol (MAP >85 mmHg which would require pressors).    Dispo pending Nephrology recommendations.    Travis Huggins MD   Critical Care Medicine - [place] [id number]  PGY-2 Internal Medicine  [phone]

## 2019-06-29 NOTE — CONSULTS
Nephrology Consult       Consult Requested By: Marisa Banks MD  Reason for Consult: ZULAY    SUBJECTIVE:     History of Present Illness:  Patient is a 38 y.o. female with history of ETOH ESLD who presented to [facility] on 2019 with decompensated liver failure associated with hepatic encephalopathy, severe hyponatremia, as well as development of upper GI bleed on 2019, and ZULAY. On presentation to [facility], creatinine increasing 1.6 and today up to 3.0. Na 123 from 118. Patient has been treated for SBP and diuretics. Now off diuretics, on albumin and midodrine/octreotide. Denies diarrhea, N/V, abd pain, SOB.     PTA Medications   Medication Sig    magnesium citrate solution Take 296 mLs by mouth once.    multivitamin (THERAGRAN) per tablet Take 1 tablet by mouth once daily.    omeprazole (PRILOSEC) 20 MG capsule Take 20 mg by mouth once daily.    spironolactone (ALDACTONE) 100 MG tablet Take 100 mg by mouth once daily.       Review of patient's allergies indicates:   Allergen Reactions    Zofran [ondansetron hcl (pf)]      headache        Past Medical History:   Diagnosis Date    Acute alcoholic hepatitis 2019    Acute liver failure without hepatic coma 2019    Alcohol use disorder, severe, dependence 2019    Coagulopathy 2019    Hepatic encephalopathy 2019    Hyponatremia 2019    Thrombocytopenia due to hypersplenism 2019     Past Surgical History:   Procedure Laterality Date     SECTION      CHOLECYSTECTOMY       History reviewed. No pertinent family history.  Social History     Tobacco Use    Smoking status: Current Every Day Smoker     Packs/day: 1.00     Years: 15.00     Pack years: 15.00     Types: Cigarettes   Substance Use Topics    Alcohol use: Not Currently     Comment: 19 last drink    Drug use: Never       Review of Systems:  Constitutional: positive for fatigue.   Eyes: Negative for discharge.   Respiratory: Negative for cough,  shortness of breath and wheezing.   Cardiovascular: Negative for chest pain and palpitations.   Gastrointestinal: Negative for nausea, vomiting, abdominal pain and diarrhea.   Genitourinary: Negative for dysuria, urgency, frequency and hematuria.   Skin: Negative for color change and rash.   Psychiatric/Behavioral: Negative for confusion.      OBJECTIVE:     Vital Signs (Most Recent)  Temp: 97.6 °F (36.4 °C) (06/29/19 0717)  Pulse: 80 (06/29/19 0729)  Resp: 18 (06/29/19 0717)  BP: (!) 92/52 (06/29/19 0717)  SpO2: (!) 94 % (06/29/19 0717)         Intake/Output Summary (Last 24 hours) at 6/29/2019 1331  Last data filed at 6/29/2019 0600  Gross per 24 hour   Intake 240 ml   Output 1201 ml   Net -961 ml       Physical Exam:  Gen: AAOx3, NAD  HEENT: mmm  Neck: no bruit, no JVD  CV: RRR, no m/r  Resp: CTAx2, normal effort  GI: soft, + ascites, NTTP, +BS  Extr: + LE edema  Neuro: normal reflexes, no focal deficits      Laboratory:  CBC with Diff:   Recent Labs   Lab 06/28/19  0425 06/28/19 1704 06/29/19  0353   WBC 19.46* 20.38* 20.61*   HGB 8.5* 8.0* 8.2*   HCT 23.6* 21.8* 22.1*   PLT 51* 54* 62*   LYMPH 8.2*  1.6 5.9*  1.2 2.7*  0.6*   MONO 4.7  0.9 4.3  0.9 2.5*  0.5   EOSINOPHIL 2.7 2.3 1.3     COAG:  Recent Labs   Lab 06/27/19  0403 06/28/19  0425 06/29/19  0353   INR 2.4* 2.4* 2.5*       CMP:   Recent Labs   Lab 06/27/19  0403  06/28/19  0424 06/28/19  1704 06/29/19  0353   GLU 59*   < > 93  93 85 148*   CALCIUM 9.3   < > 8.9  8.9 9.4 9.2   ALBUMIN 1.8*  --  2.6*  --  3.1*   PROT 5.1*  --  5.0*  --  5.5*   *   < > 119*  119* 118* 123*   K 4.2   < > 4.2  4.2 4.2 4.5   CO2 18*   < > 16*  16* 18* 16*   CL 87*   < > 89*  89* 88* 92*   BUN 71*   < > 72*  72* 74* 73*   CREATININE 1.6*   < > 2.2*  2.2* 2.9* 3.0*   ALKPHOS 318*  --  245*  --  254*   ALT 15  --  12  --  12   AST 93*  --  88*  --  97*   BILITOT 25.4*  --  24.9*  --  27.3*   MG 2.2  --  2.1  --  2.5   PHOS 6.5*  --  6.3*  --  6.6*    < > =  values in this interval not displayed.         Lab Results   Component Value Date    LABPROT 24.1 (H) 06/29/2019     Lab Results   Component Value Date    CALCIUM 9.2 06/29/2019    PHOS 6.6 (H) 06/29/2019         Recent Labs   Lab 06/29/19  1005   COLORU Radha   SPECGRAV 1.015   PHUR 6.0   PROTEINUA 2+*   WBCUA 12*   RBCUA >100*   BACTERIA Occasional   NITRITE Negative   LEUKOCYTESUR Trace*   HYALINECASTS 2*         Diagnostic Results:  Labs: Reviewed      Scheduled Meds:   albumin human 25%  25 g Intravenous BID    [START ON 6/30/2019] ciprofloxacin HCl  250 mg Oral Daily    folic acid  1 mg Oral Daily    lactulose  45 g Oral TID    lidocaine  2 patch Transdermal Q24H    midodrine  15 mg Oral TID    nicotine  1 patch Transdermal Daily    octreotide  100 mcg Intravenous Q8H    pantoprazole  40 mg Oral BID AC    rifAXImin  550 mg Oral BID    thiamine  100 mg Oral Daily     Continuous Infusions:          ASSESSMENT/PLAN:     Patient Active Problem List   Diagnosis    Acute liver failure without hepatic coma    Acute alcoholic hepatitis    Ascites due to alcoholic hepatitis    Hepatic encephalopathy    Coagulopathy    Hyponatremia    Alcohol use disorder, severe, dependence    Macrocytic anemia    Leukocytosis    Hypotension    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 female with history of ETOH ESLD who presented to [facility] on 6/26/2019 with decompensated liver failure associated with hepatic encephalopathy, severe hyponatremia, as well as development of upper GI bleed on 06/27/2019, and ZULAY. On presentation to [facility], creatinine increasing 1.6 and today up to 3.0. Na 123 from 118.     ZULAY in the setting of cirrhosis/infection/GI  bleed/hypotension likely 2/2 ATN vs HRS  - delta creatinine improving with good UOP  - will do urine sediment analysis  - agree with midodrine/octreotide and higher MAPs goals- no need for pressors and ICU at this moment  - continue supportive care, albumin, bicarb tabs   - free water restriction- Na trending up  - pending renal US  - will follow

## 2019-06-30 PROBLEM — B78.9 INFECTION DUE TO STRONGYLOIDES: Status: ACTIVE | Noted: 2019-06-30

## 2019-06-30 LAB
ABO + RH BLD: NORMAL
ALBUMIN SERPL BCP-MCNC: 3.7 G/DL (ref 3.5–5.2)
ALP SERPL-CCNC: 247 U/L (ref 55–135)
ALT SERPL W/O P-5'-P-CCNC: 11 U/L (ref 10–44)
ANION GAP SERPL CALC-SCNC: 14 MMOL/L (ref 8–16)
AST SERPL-CCNC: 72 U/L (ref 10–40)
BASOPHILS # BLD AUTO: 0.05 K/UL (ref 0–0.2)
BASOPHILS NFR BLD: 0.3 % (ref 0–1.9)
BILIRUB SERPL-MCNC: 28.6 MG/DL (ref 0.1–1)
BLD GP AB SCN CELLS X3 SERPL QL: NORMAL
BLD PROD TYP BPU: NORMAL
BLOOD UNIT EXPIRATION DATE: NORMAL
BLOOD UNIT TYPE CODE: 5100
BLOOD UNIT TYPE: NORMAL
BUN SERPL-MCNC: 68 MG/DL (ref 6–20)
CALCIUM SERPL-MCNC: 10.2 MG/DL (ref 8.7–10.5)
CHLORIDE SERPL-SCNC: 97 MMOL/L (ref 95–110)
CO2 SERPL-SCNC: 19 MMOL/L (ref 23–29)
CODING SYSTEM: NORMAL
CREAT SERPL-MCNC: 2.8 MG/DL (ref 0.5–1.4)
DIFFERENTIAL METHOD: ABNORMAL
DISPENSE STATUS: NORMAL
EOSINOPHIL # BLD AUTO: 0.4 K/UL (ref 0–0.5)
EOSINOPHIL NFR BLD: 2.3 % (ref 0–8)
ERYTHROCYTE [DISTWIDTH] IN BLOOD BY AUTOMATED COUNT: 16.6 % (ref 11.5–14.5)
EST. GFR  (AFRICAN AMERICAN): 23.8 ML/MIN/1.73 M^2
EST. GFR  (NON AFRICAN AMERICAN): 20.6 ML/MIN/1.73 M^2
GLUCOSE SERPL-MCNC: 120 MG/DL (ref 70–110)
HCT VFR BLD AUTO: 21.6 % (ref 37–48.5)
HGB BLD-MCNC: 7.8 G/DL (ref 12–16)
IMM GRANULOCYTES # BLD AUTO: 0.13 K/UL (ref 0–0.04)
IMM GRANULOCYTES NFR BLD AUTO: 0.7 % (ref 0–0.5)
INR PPP: 2.5 (ref 0.8–1.2)
LYMPHOCYTES # BLD AUTO: 1.2 K/UL (ref 1–4.8)
LYMPHOCYTES NFR BLD: 6.7 % (ref 18–48)
MAGNESIUM SERPL-MCNC: 2.7 MG/DL (ref 1.6–2.6)
MCH RBC QN AUTO: 34.8 PG (ref 27–31)
MCHC RBC AUTO-ENTMCNC: 36.1 G/DL (ref 32–36)
MCV RBC AUTO: 96 FL (ref 82–98)
MONOCYTES # BLD AUTO: 0.8 K/UL (ref 0.3–1)
MONOCYTES NFR BLD: 4.4 % (ref 4–15)
NEUTROPHILS # BLD AUTO: 15.1 K/UL (ref 1.8–7.7)
NEUTROPHILS NFR BLD: 85.6 % (ref 38–73)
NRBC BLD-RTO: 0 /100 WBC
PHOSPHATE SERPL-MCNC: 5.1 MG/DL (ref 2.7–4.5)
PLATELET # BLD AUTO: 77 K/UL (ref 150–350)
PMV BLD AUTO: 12.4 FL (ref 9.2–12.9)
POCT GLUCOSE: 101 MG/DL (ref 70–110)
POTASSIUM SERPL-SCNC: 3.7 MMOL/L (ref 3.5–5.1)
PROT SERPL-MCNC: 6.1 G/DL (ref 6–8.4)
PROTHROMBIN TIME: 23.9 SEC (ref 9–12.5)
RBC # BLD AUTO: 2.24 M/UL (ref 4–5.4)
SODIUM SERPL-SCNC: 130 MMOL/L (ref 136–145)
TRANS ERYTHROCYTES VOL PATIENT: NORMAL ML
WBC # BLD AUTO: 17.64 K/UL (ref 3.9–12.7)

## 2019-06-30 PROCEDURE — 25000003 PHARM REV CODE 250: Performed by: INTERNAL MEDICINE

## 2019-06-30 PROCEDURE — 83735 ASSAY OF MAGNESIUM: CPT

## 2019-06-30 PROCEDURE — 20600001 HC STEP DOWN PRIVATE ROOM

## 2019-06-30 PROCEDURE — 36415 COLL VENOUS BLD VENIPUNCTURE: CPT

## 2019-06-30 PROCEDURE — P9047 ALBUMIN (HUMAN), 25%, 50ML: HCPCS | Mod: JG | Performed by: STUDENT IN AN ORGANIZED HEALTH CARE EDUCATION/TRAINING PROGRAM

## 2019-06-30 PROCEDURE — 63600175 PHARM REV CODE 636 W HCPCS: Mod: JG | Performed by: STUDENT IN AN ORGANIZED HEALTH CARE EDUCATION/TRAINING PROGRAM

## 2019-06-30 PROCEDURE — 99233 PR SUBSEQUENT HOSPITAL CARE,LEVL III: ICD-10-PCS | Mod: ,,, | Performed by: HOSPITALIST

## 2019-06-30 PROCEDURE — 63600175 PHARM REV CODE 636 W HCPCS: Performed by: HOSPITALIST

## 2019-06-30 PROCEDURE — 25000003 PHARM REV CODE 250: Performed by: HOSPITALIST

## 2019-06-30 PROCEDURE — 80053 COMPREHEN METABOLIC PANEL: CPT

## 2019-06-30 PROCEDURE — 86850 RBC ANTIBODY SCREEN: CPT

## 2019-06-30 PROCEDURE — 84100 ASSAY OF PHOSPHORUS: CPT

## 2019-06-30 PROCEDURE — P9047 ALBUMIN (HUMAN), 25%, 50ML: HCPCS | Mod: JG | Performed by: HOSPITALIST

## 2019-06-30 PROCEDURE — 85025 COMPLETE CBC W/AUTO DIFF WBC: CPT

## 2019-06-30 PROCEDURE — S4991 NICOTINE PATCH NONLEGEND: HCPCS | Performed by: HOSPITALIST

## 2019-06-30 PROCEDURE — 86920 COMPATIBILITY TEST SPIN: CPT

## 2019-06-30 PROCEDURE — 85610 PROTHROMBIN TIME: CPT

## 2019-06-30 PROCEDURE — 99233 SBSQ HOSP IP/OBS HIGH 50: CPT | Mod: ,,, | Performed by: HOSPITALIST

## 2019-06-30 RX ORDER — ALBUMIN HUMAN 250 G/1000ML
25 SOLUTION INTRAVENOUS 3 TIMES DAILY
Status: DISCONTINUED | OUTPATIENT
Start: 2019-06-30 | End: 2019-07-01

## 2019-06-30 RX ORDER — LACTULOSE 10 G/15ML
20 SOLUTION ORAL 3 TIMES DAILY
Status: DISCONTINUED | OUTPATIENT
Start: 2019-06-30 | End: 2019-07-05 | Stop reason: HOSPADM

## 2019-06-30 RX ADMIN — MIDODRINE HYDROCHLORIDE 15 MG: 5 TABLET ORAL at 10:06

## 2019-06-30 RX ADMIN — TRAMADOL HYDROCHLORIDE 50 MG: 50 TABLET, FILM COATED ORAL at 03:06

## 2019-06-30 RX ADMIN — CIPROFLOXACIN HYDROCHLORIDE 250 MG: 250 TABLET, FILM COATED ORAL at 11:06

## 2019-06-30 RX ADMIN — OCTREOTIDE ACETATE 100 MCG: 100 INJECTION, SOLUTION INTRAVENOUS; SUBCUTANEOUS at 05:06

## 2019-06-30 RX ADMIN — PROMETHAZINE HYDROCHLORIDE 12.5 MG: 12.5 TABLET ORAL at 06:06

## 2019-06-30 RX ADMIN — Medication 100 MG: at 11:06

## 2019-06-30 RX ADMIN — SODIUM BICARBONATE 650 MG TABLET 1300 MG: at 11:06

## 2019-06-30 RX ADMIN — TRAMADOL HYDROCHLORIDE 50 MG: 50 TABLET, FILM COATED ORAL at 10:06

## 2019-06-30 RX ADMIN — OCTREOTIDE ACETATE 100 MCG: 100 INJECTION, SOLUTION INTRAVENOUS; SUBCUTANEOUS at 10:06

## 2019-06-30 RX ADMIN — SODIUM BICARBONATE 650 MG TABLET 1300 MG: at 10:06

## 2019-06-30 RX ADMIN — LACTULOSE 20 G: 20 SOLUTION ORAL at 10:06

## 2019-06-30 RX ADMIN — FOLIC ACID 1 MG: 1 TABLET ORAL at 11:06

## 2019-06-30 RX ADMIN — RIFAXIMIN 550 MG: 550 TABLET ORAL at 10:06

## 2019-06-30 RX ADMIN — MIDODRINE HYDROCHLORIDE 15 MG: 5 TABLET ORAL at 03:06

## 2019-06-30 RX ADMIN — PANTOPRAZOLE SODIUM 40 MG: 40 TABLET, DELAYED RELEASE ORAL at 03:06

## 2019-06-30 RX ADMIN — ALBUMIN (HUMAN) 25 G: 25 SOLUTION INTRAVENOUS at 03:06

## 2019-06-30 RX ADMIN — NICOTINE 1 PATCH: 21 PATCH, EXTENDED RELEASE TRANSDERMAL at 11:06

## 2019-06-30 RX ADMIN — OCTREOTIDE ACETATE 100 MCG: 100 INJECTION, SOLUTION INTRAVENOUS; SUBCUTANEOUS at 03:06

## 2019-06-30 RX ADMIN — ALBUMIN (HUMAN) 25 G: 25 SOLUTION INTRAVENOUS at 11:06

## 2019-06-30 RX ADMIN — MIDODRINE HYDROCHLORIDE 15 MG: 5 TABLET ORAL at 11:06

## 2019-06-30 RX ADMIN — ALBUMIN (HUMAN) 25 G: 25 SOLUTION INTRAVENOUS at 10:06

## 2019-06-30 RX ADMIN — PROMETHAZINE HYDROCHLORIDE 12.5 MG: 12.5 TABLET ORAL at 11:06

## 2019-06-30 RX ADMIN — RIFAXIMIN 550 MG: 550 TABLET ORAL at 11:06

## 2019-06-30 RX ADMIN — PANTOPRAZOLE SODIUM 40 MG: 40 TABLET, DELAYED RELEASE ORAL at 05:06

## 2019-06-30 NOTE — PLAN OF CARE
Problem: Adult Inpatient Plan of Care  Goal: Plan of Care Review  Outcome: Ongoing (interventions implemented as appropriate)  Plan of care reviewed with patient and spouse. Spouse remain at bedside overnight. All questions answered. No acute changes overnight. Safety maintained, bed in lowest position, call light in reach, WCTM.

## 2019-06-30 NOTE — PROGRESS NOTES
Progress Note  [facility]  [facility]       Patient Name: Destinee Gallagher  MRN:  [id number]  Hospital Medicine Team: [facility] MED L Marisa Banks MD  Date of Admission:  6/26/2019     Length of Stay:  LOS: 4 days   Expected Discharge Date: 7/3/2019  Principal Problem:  Acute liver failure without hepatic coma     Subjective:     Interval History/Overnight Events:      Doing well today clinically , with no acute distress. No acute problems today. Went outside for a walk today  Cr 2.8 today .Patient with HRS, on HRS meds    Having adequate BMs. Mentation at baseline with no asterixis   MELD 39    Patient remains very low threshold to ICU admission     albumin human 25%  25 g Intravenous TID    ciprofloxacin HCl  250 mg Oral Daily    folic acid  1 mg Oral Daily    lactulose  45 g Oral TID    lidocaine  2 patch Transdermal Q24H    midodrine  15 mg Oral TID    nicotine  1 patch Transdermal Daily    octreotide  100 mcg Intravenous Q8H    pantoprazole  40 mg Oral BID AC    rifAXImin  550 mg Oral BID    sodium bicarbonate  1,300 mg Oral BID    thiamine  100 mg Oral Daily           acetaminophen, Dextrose 10% Bolus, Dextrose 10% Bolus, glucagon (human recombinant), glucose, glucose, polyethylene glycol, promethazine, ramelteon, sodium chloride 0.9%, traMADol    Review of Systems   Constitutional: Negative for chills, fatigue, fever.   HENT: Negative for sore throat, trouble swallowing.    Eyes: Negative for photophobia, visual disturbance.   Respiratory: Negative for cough, shortness of breath.    Cardiovascular: Negative for chest pain, palpitations, leg swelling.   Gastrointestinal: Negative for abdominal pain, constipation, diarrhea, nausea, vomiting.   Endocrine: Negative for cold intolerance, heat intolerance.   Genitourinary: Negative for dysuria, frequency.   Musculoskeletal: Negative for arthralgias, myalgias.   Skin: Negative for rash, wound, erythema    Neurological: Negative for dizziness, syncope, weakness, light-headedness.   Psychiatric/Behavioral: Negative for confusion, hallucinations, anxiety  All other systems reviewed and are negative.    Objective:     Temp:  [97.5 °F (36.4 °C)-99.2 °F (37.3 °C)]   Pulse:  [73-87]   Resp:  [17-18]   BP: ()/(51-62)   SpO2:  [88 %-96 %]       Physical Exam:  Constitutional: appears older than stated age, chronically ill looking,  non-distressed, not diaphoretic.   HENT: NC/AT, external ears normal, oropharynx clear, MMM w/o exudates.   Eyes: PERRL, EOMI, conjunctiva normal, no discharge b/l, +scleral icterus   Neck: normal ROM, supple  CV: RRR, no m/r/g, no carotid bruits, +2 peripheral pulses.  Pulmonary/Chest wall: Breathing comfortably w/o distress, CTAB, no w/r/r, no crackles.  Decreased breath sounds at lung bases  GI: Soft, non-tender, (+) BS, (+) BM   +distended, tender palpation    Musculoskeletal: Normal ROM, no atrophy,  +no pitting edema in LE bilaterally   Neurological: AAO x 4, CN II-XI in tact, nl sensation, nl strength/tone    No asterixis   Skin: warm, dry   + jaundice, + significant spider angiomas, +bruising   Psych: normal mood and affect, normal behavior, thought content and judgement.    Labs:    Chemistries:   Recent Labs   Lab 06/28/19  0424  06/29/19  0353 06/29/19  1532 06/30/19  0621   *  119*   < > 123* 126* 130*   K 4.2  4.2   < > 4.5 4.2 3.7   CL 89*  89*   < > 92* 95 97   CO2 16*  16*   < > 16* 15* 19*   BUN 72*  72*   < > 73* 70* 68*   CREATININE 2.2*  2.2*   < > 3.0* 2.8* 2.8*   CALCIUM 8.9  8.9   < > 9.2 9.8 10.2   PROT 5.0*  --  5.5*  --  6.1   BILITOT 24.9*  --  27.3*  --  28.6*   ALKPHOS 245*  --  254*  --  247*   ALT 12  --  12  --  11   AST 88*  --  97*  --  72*   MG 2.1  --  2.5  --  2.7*   PHOS 6.3*  --  6.6*  --  5.1*    < > = values in this interval not displayed.        WBC:   Recent Labs   Lab 06/28/19  0425 06/28/19  1704 06/29/19  0353 06/29/19  1532  06/30/19  0621   WBC 19.46* 20.38* 20.61* 20.65* 17.64*     Bands:     CBC/Anemia Labs: Coags:    Recent Labs   Lab 06/29/19  0353 06/29/19  1532 06/30/19  0621   WBC 20.61* 20.65* 17.64*   HGB 8.2* 8.0* 7.8*   HCT 22.1* 21.7* 21.6*   PLT 62* 68* 77*   MCV 96 96 96   RDW 16.7* 16.8* 16.6*    Recent Labs   Lab 06/28/19  0425 06/29/19  0353 06/30/19  0621   INR 2.4* 2.5* 2.5*        POCT Glucose: HbA1c:    Recent Labs   Lab 06/27/19  2250 06/28/19  0748 06/28/19  1723 06/29/19  0028 06/29/19  0613 06/29/19  2208   POCTGLUCOSE 156* 100 90 171* 159* 119*    No results found for: HGBA1C       Assessment and Plan     Hospital Course:    Ms. Destinee Gallagher is a 38 y.o. female who presented to [facility] on 6/26/2019 with ETOH hepatitis and acute liver failure , associated with hepatic encephalopathy, Severe hyponatremia with sodium level of 117, ascites, coagulopathy and thrombocytopenia, as well as development of upper GI bleed on 06/27/2019, and  ZULAY on 06/27/2019    Active Hospital Problems    Diagnosis  POA    *Acute liver failure without hepatic coma [K72.00]  Yes    Infection due to strongyloides [B78.9]  Yes    Coffee ground emesis [K92.0]  Yes    Upper GI bleed [K92.2]  No    Acute blood loss anemia [D62]  No    Hepatorenal syndrome [K76.7]  No    Acute alcoholic hepatitis [K70.10]  Yes    Ascites due to alcoholic hepatitis [K70.11]  Yes     Has had hx of SBP , as per family       Hepatic encephalopathy [K72.90]  Yes    Coagulopathy [D68.9]  Yes    Hyponatremia [E87.1]  Yes    Alcohol use disorder, severe, dependence [F10.20]  Yes    Macrocytic anemia [D53.9]  Yes    Leukocytosis [D72.829]  Yes    Hypotension [I95.9]  Yes    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     Acute liver failure without hepatic coma  Acute alcoholic hepatitis with ascites  Alcoholic cirrhosis of liver with ascites     MELD-Na score: 39 at 6/30/2019  6:21 AM  MELD score: 39 at 6/30/2019  6:21 AM  Calculated from:  Serum Creatinine: 2.8 mg/dL at 6/30/2019  6:21 AM  Serum Sodium: 130 mmol/L at 6/30/2019  6:21 AM  Total Bilirubin: 28.6 mg/dL at 6/30/2019  6:21 AM  INR(ratio): 2.5 at 6/30/2019  6:21 AM  Age: 38 years     - decompensation due to recent alcohol binge drinking.  Meld score of 35 on admission  - hepatology consulted.  Obtaining financial approval for initiation of inpatient liver transplant evaluation  - [facility]  - viral and autoimmune markers negative  - LVP on 06/26/2019,  3.4 L drained.  No SBP found on ascitic fluid.  Needs at least p.o. ciprofloxacin prophylaxis given history of SBP in the past  - treat hypotension with midodrine.   - p.o. Lactulose and PO rifaximin for hepatic encephalopathy  - IV vitamin K for coagulopathy  - Psychiatry consulted on the case. deemed patient high risk, needs ETOH rehab  - serology for liver transplant evaluation -ordered and pending.  - SBP ppx with PO cipro as patient has had hx of SBP  - patient with decompensation on 06/27/2019 and some of the pre liver transplant testing and imaging was deferred that day. Restarted on 6/28. Needs GYN, ID, mammo, LTS, possibly an EGD for txp eval as well . + strongyloides and will need therapy     Upper GI bleed  Acute blood loss anemia  - patient with coffee-ground emesis on 06/27/2019 with hemoglobin decreased from 10-8.8.  May be due to portal hypertensive gastropathy, however given history of cirrhosis, there is definitely concern for variceal bleeding  - patient started on IV ceftriaxone for SBP prophylaxis in lieu of GI bleeding  - started on IV octreotide drip and  IV PPI b.i.d. On 6/27. Planned  for EGD on 06/28/2019 but procedure was deferred due to high chance of  decompensation  - will plan to transfuse for hemoglobin likely <8  - 1 unit of FFP transfusion on 06/27/2019 given significant coagulopathy with INR greater than 2 and active bleeding  - IV oct gtt and IV ppi stopped on 6/28 , IV ceftriaxone de-escalated to Cipro prophylaxis.  Plan to monitor for further bleeding and reassess for necessity for EGD    Hepatorenal syndrome  - creatinine increasing to 2.1 on 06/27/2019.  Creatinine on admission 1.3, patient was receiving diuretics at outside hospital.  Urine sodium is less than 20, with ascites and hypotension.  Which is highly highly concerning for hepatorenal syndrome  - continue max dose midodrine 15 t.i.d.  - patient has been on subcu octreotide and transition to octreotide drip for upper GI bleeding  - ICU consulted on 06/27/2019 given tenuous blood pressures, worsening encephalopathy, worsening renal failure, significant hyponatremia,  upper GI bleeding.  Patient will need close monitoring and possibly transfer for pressors to the ICU, when deemed appropriate by the ICU team  - cont midodrine, IV octreotide and albumin.   - 6/29 - Nephrology consulted. ICU consulted for IV pressors in setting of HRS and low BPs. Nephrology recommended current regimen and no IV pressors at this time     Acute on Chronic hepatic encephalopathy  - PO lactulose  - PO rifaximin  - monitor BMs, goal of 3-4 . Monitor mental status      Alcoh ol use disorder, severe, dependence  - with history of alcoholic cirrhosis and alcoholic hepatitis leading to acute liver failure  - urine toxicology ordered  - [facility] 107  - Psychiatry consulted on the case- deemed patient high risk, needs ETOH rehab  - at one point, Patient explicitly stated that she does not need Alcohol Rehab . She stated that her current problem is due to alcohol binge earlier in May due to problems in personal life and associated anxiety with that.  As per , patient has underwent multiple rehabs in the past and relapsed.   The longest patient could ever go without relapsing was 1 year in early 2000s     Hyponatremia  - sodium of 118 on admission, patient has been getting diuretics at outside hospital.  Likely hypervolemic hyponatremia in setting of decompensated cirrhosis and volume overload  - hold diuretics at this time  - given hypotension hyponatremia and relatively normal potassium, AM cortisol level checked and noted to be at 7.8.   - cosyntropin stim test to evaluated for adrenal insufficiency - test was not optimally performed and showed unlikely adrenal insufficiency   - fluid restriction to 1 L per day     Hypotension  - likely due to liver failure and decompensated hepatic cirrhosis  - patient started on midodrine 5 mg t.i.d.--> increased to 15 t.i.d. given very tenuous blood pressures  -very low threshold for  transfer to ICU for IV pressors     Leukocytosis  - WBC count of 23 on admission.  Very likely due to alcoholic hepatitis.  WBC of 21 down trending from 20/6 at outside hospital  - no SBP and cx remains ngtd  - antibiotics as above  - continue monitor     Anasarca  - likely due to liver failure and decompensated hepatic cirrhosis  - 2D echo with 65 % and Estimated PA pressure is at least 36 mmHg.     Macrocytic Anemia  Thrombocytopenia  Coagulopathy  - hemoglobin of 10 with MCV of 100 on admission.  - platelets of 54 on admission.  - INR of 2.4 on admission likely due to liver failure  - administer IV vitamin K x3 days for coagulopathy-- INR remains 2.4 despite vitamin K  - monitor platelets and hemoglobin daily  - DIC and hemolysis studies negative     Tobacco use disorder  - patient is a current every day smoker  - will assess for necessity of nicotine patch     Diet:  Low Na, fluid restriction   GI PPx:  ppi  DVT PPx:  scds  Goals of Care:  full      High Risk Conditions:  Liver failure     Disposition:      MELD 39   Patient remains very high risk for quick decompensation and low threshold for transfer to  ICU    Signing Physician:     Marisa Banks MD  Department of Hospital Medicine   [facility]  Pager [phone] [id number]  6/30/2019

## 2019-07-01 LAB
ALBUMIN SERPL BCP-MCNC: 4.1 G/DL (ref 3.5–5.2)
ALP SERPL-CCNC: 225 U/L (ref 55–135)
ALT SERPL W/O P-5'-P-CCNC: 10 U/L (ref 10–44)
ANION GAP SERPL CALC-SCNC: 14 MMOL/L (ref 8–16)
AST SERPL-CCNC: 81 U/L (ref 10–40)
BACTERIA BLD CULT: NORMAL
BACTERIA BLD CULT: NORMAL
BASOPHILS # BLD AUTO: 0.05 K/UL (ref 0–0.2)
BASOPHILS NFR BLD: 0.3 % (ref 0–1.9)
BILIRUB SERPL-MCNC: 29.1 MG/DL (ref 0.1–1)
BLD PROD TYP BPU: NORMAL
BLOOD UNIT EXPIRATION DATE: NORMAL
BLOOD UNIT TYPE CODE: 5100
BLOOD UNIT TYPE: NORMAL
BUN SERPL-MCNC: 59 MG/DL (ref 6–20)
CALCIUM SERPL-MCNC: 10.1 MG/DL (ref 8.7–10.5)
CHLORIDE SERPL-SCNC: 98 MMOL/L (ref 95–110)
CO2 SERPL-SCNC: 20 MMOL/L (ref 23–29)
CODING SYSTEM: NORMAL
CREAT SERPL-MCNC: 2.1 MG/DL (ref 0.5–1.4)
DIFFERENTIAL METHOD: ABNORMAL
DISPENSE STATUS: NORMAL
EOSINOPHIL # BLD AUTO: 0.3 K/UL (ref 0–0.5)
EOSINOPHIL NFR BLD: 2.3 % (ref 0–8)
ERYTHROCYTE [DISTWIDTH] IN BLOOD BY AUTOMATED COUNT: 16.3 % (ref 11.5–14.5)
EST. GFR  (AFRICAN AMERICAN): 33.7 ML/MIN/1.73 M^2
EST. GFR  (NON AFRICAN AMERICAN): 29.2 ML/MIN/1.73 M^2
GLUCOSE SERPL-MCNC: 86 MG/DL (ref 70–110)
HCT VFR BLD AUTO: 19.8 % (ref 37–48.5)
HGB BLD-MCNC: 7.2 G/DL (ref 12–16)
IMM GRANULOCYTES # BLD AUTO: 0.12 K/UL (ref 0–0.04)
IMM GRANULOCYTES NFR BLD AUTO: 0.8 % (ref 0–0.5)
INR PPP: 2.4 (ref 0.8–1.2)
LYMPHOCYTES # BLD AUTO: 0.9 K/UL (ref 1–4.8)
LYMPHOCYTES NFR BLD: 5.8 % (ref 18–48)
MAGNESIUM SERPL-MCNC: 2.6 MG/DL (ref 1.6–2.6)
MCH RBC QN AUTO: 35.3 PG (ref 27–31)
MCHC RBC AUTO-ENTMCNC: 36.4 G/DL (ref 32–36)
MCV RBC AUTO: 97 FL (ref 82–98)
MONOCYTES # BLD AUTO: 0.9 K/UL (ref 0.3–1)
MONOCYTES NFR BLD: 6.2 % (ref 4–15)
NEUTROPHILS # BLD AUTO: 12.3 K/UL (ref 1.8–7.7)
NEUTROPHILS NFR BLD: 84.6 % (ref 38–73)
NRBC BLD-RTO: 0 /100 WBC
PHOSPHATE SERPL-MCNC: 4.4 MG/DL (ref 2.7–4.5)
PLATELET # BLD AUTO: 57 K/UL (ref 150–350)
PMV BLD AUTO: 12.4 FL (ref 9.2–12.9)
POCT GLUCOSE: 111 MG/DL (ref 70–110)
POTASSIUM SERPL-SCNC: 3.9 MMOL/L (ref 3.5–5.1)
PROT SERPL-MCNC: 6.3 G/DL (ref 6–8.4)
PROTHROMBIN TIME: 23.2 SEC (ref 9–12.5)
RBC # BLD AUTO: 2.04 M/UL (ref 4–5.4)
SODIUM SERPL-SCNC: 132 MMOL/L (ref 136–145)
TRANS ERYTHROCYTES VOL PATIENT: NORMAL ML
WBC # BLD AUTO: 14.56 K/UL (ref 3.9–12.7)

## 2019-07-01 PROCEDURE — 25000003 PHARM REV CODE 250: Performed by: HOSPITALIST

## 2019-07-01 PROCEDURE — 80053 COMPREHEN METABOLIC PANEL: CPT

## 2019-07-01 PROCEDURE — 99232 SBSQ HOSP IP/OBS MODERATE 35: CPT | Mod: ,,, | Performed by: INTERNAL MEDICINE

## 2019-07-01 PROCEDURE — 99223 PR INITIAL HOSPITAL CARE,LEVL III: ICD-10-PCS | Mod: ,,, | Performed by: NURSE PRACTITIONER

## 2019-07-01 PROCEDURE — 36415 COLL VENOUS BLD VENIPUNCTURE: CPT

## 2019-07-01 PROCEDURE — 63600175 PHARM REV CODE 636 W HCPCS: Mod: JG | Performed by: HOSPITALIST

## 2019-07-01 PROCEDURE — 84100 ASSAY OF PHOSPHORUS: CPT

## 2019-07-01 PROCEDURE — 99223 1ST HOSP IP/OBS HIGH 75: CPT | Mod: ,,, | Performed by: NURSE PRACTITIONER

## 2019-07-01 PROCEDURE — P9021 RED BLOOD CELLS UNIT: HCPCS

## 2019-07-01 PROCEDURE — 20600001 HC STEP DOWN PRIVATE ROOM

## 2019-07-01 PROCEDURE — 36430 TRANSFUSION BLD/BLD COMPNT: CPT

## 2019-07-01 PROCEDURE — 85610 PROTHROMBIN TIME: CPT

## 2019-07-01 PROCEDURE — 83735 ASSAY OF MAGNESIUM: CPT

## 2019-07-01 PROCEDURE — P9047 ALBUMIN (HUMAN), 25%, 50ML: HCPCS | Mod: JG | Performed by: HOSPITALIST

## 2019-07-01 PROCEDURE — 25000003 PHARM REV CODE 250: Performed by: PHYSICIAN ASSISTANT

## 2019-07-01 PROCEDURE — S4991 NICOTINE PATCH NONLEGEND: HCPCS | Performed by: HOSPITALIST

## 2019-07-01 PROCEDURE — 85025 COMPLETE CBC W/AUTO DIFF WBC: CPT

## 2019-07-01 PROCEDURE — 25000003 PHARM REV CODE 250: Performed by: INTERNAL MEDICINE

## 2019-07-01 PROCEDURE — 99232 PR SUBSEQUENT HOSPITAL CARE,LEVL II: ICD-10-PCS | Mod: ,,, | Performed by: INTERNAL MEDICINE

## 2019-07-01 PROCEDURE — 99233 SBSQ HOSP IP/OBS HIGH 50: CPT | Mod: ,,, | Performed by: HOSPITALIST

## 2019-07-01 PROCEDURE — 99233 PR SUBSEQUENT HOSPITAL CARE,LEVL III: ICD-10-PCS | Mod: ,,, | Performed by: HOSPITALIST

## 2019-07-01 RX ORDER — ALBUMIN HUMAN 250 G/1000ML
25 SOLUTION INTRAVENOUS 2 TIMES DAILY
Status: DISCONTINUED | OUTPATIENT
Start: 2019-07-01 | End: 2019-07-01

## 2019-07-01 RX ORDER — HYDROCODONE BITARTRATE AND ACETAMINOPHEN 500; 5 MG/1; MG/1
TABLET ORAL
Status: DISCONTINUED | OUTPATIENT
Start: 2019-07-01 | End: 2019-07-02

## 2019-07-01 RX ADMIN — CIPROFLOXACIN HYDROCHLORIDE 250 MG: 250 TABLET, FILM COATED ORAL at 09:07

## 2019-07-01 RX ADMIN — LIDOCAINE 2 PATCH: 50 PATCH TOPICAL at 09:07

## 2019-07-01 RX ADMIN — PROMETHAZINE HYDROCHLORIDE 12.5 MG: 12.5 TABLET ORAL at 03:07

## 2019-07-01 RX ADMIN — RAMELTEON 8 MG: 8 TABLET, FILM COATED ORAL at 09:07

## 2019-07-01 RX ADMIN — LACTULOSE 20 G: 20 SOLUTION ORAL at 09:07

## 2019-07-01 RX ADMIN — TRAMADOL HYDROCHLORIDE 50 MG: 50 TABLET, FILM COATED ORAL at 06:07

## 2019-07-01 RX ADMIN — LACTULOSE 20 G: 20 SOLUTION ORAL at 03:07

## 2019-07-01 RX ADMIN — RIFAXIMIN 550 MG: 550 TABLET ORAL at 09:07

## 2019-07-01 RX ADMIN — FOLIC ACID 1 MG: 1 TABLET ORAL at 09:07

## 2019-07-01 RX ADMIN — PROMETHAZINE HYDROCHLORIDE 12.5 MG: 12.5 TABLET ORAL at 04:07

## 2019-07-01 RX ADMIN — SODIUM BICARBONATE 650 MG TABLET 1300 MG: at 09:07

## 2019-07-01 RX ADMIN — PROMETHAZINE HYDROCHLORIDE 12.5 MG: 12.5 TABLET ORAL at 09:07

## 2019-07-01 RX ADMIN — MIDODRINE HYDROCHLORIDE 15 MG: 5 TABLET ORAL at 09:07

## 2019-07-01 RX ADMIN — ALBUMIN (HUMAN) 25 G: 25 SOLUTION INTRAVENOUS at 09:07

## 2019-07-01 RX ADMIN — OCTREOTIDE ACETATE 100 MCG: 100 INJECTION, SOLUTION INTRAVENOUS; SUBCUTANEOUS at 02:07

## 2019-07-01 RX ADMIN — PANTOPRAZOLE SODIUM 40 MG: 40 TABLET, DELAYED RELEASE ORAL at 04:07

## 2019-07-01 RX ADMIN — TRAMADOL HYDROCHLORIDE 50 MG: 50 TABLET, FILM COATED ORAL at 09:07

## 2019-07-01 RX ADMIN — PANTOPRAZOLE SODIUM 40 MG: 40 TABLET, DELAYED RELEASE ORAL at 06:07

## 2019-07-01 RX ADMIN — ALBUMIN (HUMAN) 25 G: 25 SOLUTION INTRAVENOUS at 10:07

## 2019-07-01 RX ADMIN — MIDODRINE HYDROCHLORIDE 15 MG: 5 TABLET ORAL at 03:07

## 2019-07-01 RX ADMIN — NICOTINE 1 PATCH: 21 PATCH, EXTENDED RELEASE TRANSDERMAL at 09:07

## 2019-07-01 RX ADMIN — Medication 100 MG: at 09:07

## 2019-07-01 NOTE — CONSULTS
[facility]  Infectious Disease  Consult Note    Patient Name: Destinee Gallagher  MRN: [id number]  Admission Date: 6/26/2019  Hospital Length of Stay: 5 days  Attending Physician: Marisa Banks MD  Primary Care Provider: Harsha Larson     Isolation Status: No active isolations      Inpatient consult to Infectious Diseases  Consult performed by: AFUA Peres, ANP  Consult ordered by: Marisa Banks MD  Reason for consult: pre liver txp eval, strongy +        ID Consult acknowledged.   Full consult and recommendations to follow.   In the interim, please call for any immediate concerns.     Thank you.   AFUA Duffy, ANP-C  [phone] pager,  tdlnrolcdhe [phone]

## 2019-07-01 NOTE — PLAN OF CARE
Problem: Adult Inpatient Plan of Care  Goal: Plan of Care Review  Outcome: Ongoing (interventions implemented as appropriate)  Plan of care reviewed with patient and .  remain at bedside overnight. Prn nausea medication given as ordered overnight. No acute changes overnight. Safety maintained, bed in lowest position, call light in reach, WCTM.

## 2019-07-01 NOTE — PLAN OF CARE
Problem: Adult Inpatient Plan of Care  Goal: Plan of Care Review  Outcome: Ongoing (interventions implemented as appropriate)  POC reviewed with patient and spouse. Teresa d/c'ed, patient states she voided within 6 hour post removal time frame. PRN Phenergan given x 2 for nausea, PRN Tramadol given x 1 for pain. No evidence of distress, safety maintained, hourly rounding performed. Will continue to monitor.

## 2019-07-01 NOTE — ASSESSMENT & PLAN NOTE
MELD-Na score: 37 at 7/1/2019  5:26 AM  MELD score: 36 at 7/1/2019  5:26 AM  Calculated from:  Serum Creatinine: 2.1 mg/dL at 7/1/2019  5:26 AM  Serum Sodium: 132 mmol/L at 7/1/2019  5:26 AM  Total Bilirubin: 29.1 mg/dL at 7/1/2019  5:26 AM  INR(ratio): 2.4 at 7/1/2019  5:26 AM  Age: 38 years  Treatment as per hepatology and primary care

## 2019-07-01 NOTE — PROGRESS NOTES
Rapid Response Nurse Follow-up Note     Followed up with patient for proactive rounding.   No acute issues at this time. Reviewed plan of care with primary RNMariam.   Please call Rapid Response RN, Clint Bellamy RN with any questions or concerns at [phone].

## 2019-07-01 NOTE — CONSULTS
Infectious Disease  Pre-Liver Transplant Evaluation Note:    Consulting Physician: Dr. ULYSSES Banks  Reason for Consult: Pre-LiverTransplant evaluation    Assessment, Plan and Counselin. Risks of Infection: No unusual risks of infection or significant barriers to transplantation were identified from the infectious disease standpoint given the available information at this time subject to the following.       -  Strongyloides IgG positive. Will treat with ivermectin 200 mcg/kg X two doses (ordered)   -  Have ordered Quantiferon Gold.    If positive, please consult ID. If  Indeterminate, please draw T spot.  If T spot positive, please consult ID.     -  Leukocytosis -  Per outside records, chronic.  Likely related to liver disease/ETOH hepatitis.  Has had Hematology evaluation at [facility].  Blood cultures, recent ascitic fluid cultures negative. Afebrile.    2. Immunizations:  The patient's immunization history and serologies were reviewed.  Based on the patients immunization history and serologies, immunizations were ordered:       - Tdap, Prevnar, Heplisav-B series initiated   - Will need 2nd Heplisav-B in one months (on or after 8/3/19)   -  Pneumovax 8 weeks after Prevnar ( on or after 9/3/19)    The patient was encouraged to contact us about any problems that may develop after immunization and possible side effects were reviewed.    3. Counseling:  I discussed with the patient and her  the risk for increased susceptibility to infections following transplantation including increased risk for infection right after transplant and if rejection should occur.  The patient and her  have been counseled on the importance of vaccinations including but not limited to a yearly flu vaccine.    4. Specific guidance has been provided to the patient regarding the patients occupation, hobbies and activities to avoid future infectious complications including but not limited to avoiding undercooked meats and  seafood, proper hygiene, and contact with animals.      Final determination of transplant candidacy will be made once evaluation is complete and reviewed by the Liver Transplant Selection Committee.    Thank you.   Please call for any questions or concerns.  AFUA Duffy, ANP-C   [phone]      Problem List:  Active Hospital Problems    Diagnosis  POA    *Acute liver failure without hepatic coma [K72.00]  Yes    Infection due to strongyloides [B78.9]  Yes    Coffee ground emesis [K92.0]  Yes    Upper GI bleed [K92.2]  No    Acute blood loss anemia [D62]  No    Hepatorenal syndrome [K76.7]  No    Acute alcoholic hepatitis [K70.10]  Yes    Ascites due to alcoholic hepatitis [K70.11]  Yes     Has had hx of SBP , as per family       Hepatic encephalopathy [K72.90]  Yes    Coagulopathy [D68.9]  Yes    Hyponatremia [E87.1]  Yes    Alcohol use disorder, severe, dependence [F10.20]  Yes    Macrocytic anemia [D53.9]  Yes    Leukocytosis [D72.829]  Yes    Hypotension [I95.9]  Yes    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       Chief Complaint:  Pre-Liver  Evaluation    History of Present Illness:    38 y.o. female who presented to [facility] on 6/26/2019 with ETOH hepatitis and acute liver failure , associated with hepatic encephalopathy, Severe hyponatremia with sodium level of 117, ascites, coagulopathy and thrombocytopenia, as well as development of upper GI bleed on 06/27/2019, and  ZULAY on 06/27/2019.    Infectious disease is consulted for Pre-Liver Transplant Evaluation       She was transferred from [facility] where she was admitted on 6/6.  Per admission HPI she was diagnosed and treated there for SBP.  Has  required multiple paracenteses.  On cipro for  SBP ppx.  Has had persistent leukocytosis.  23 on admit >>16.4.  Per review of CareEverywhere records she has had chronic neutrophilic leukocytosis for some time.  Has been evaluated by hematology - no malignancy no acute findings except for liver disease    The infectious disease pre-transplant questionnaire was reviewed with the patient and her .     Patient denies recent fevers, chills, infective illnesses.     No history of diabetes.  No current steroids.  Reports intermittent courses of steroids for ETOH hepatitis  Denies splenectomy    Denies history of chronic, recurring infections of sinuses, throat, bladder/kidneys, intestines, skin, reproductive organs, teeth or gums.     History of chickenpox.  No shingles.   Denies history of syphilis, gonorrhea, other STDs/viral hepatitis/ or other infective illnesses.     Denies known exposure to TB  No history of residence in coccidioides endemic areas  No foreign travel    Animal exposures:  Cats and dogs.  Fully vaccinated.   cares for teresa litter  Occupational: Office management, bookkeeping, accounting   Hobbies: listening to music  She does not eat raw or undercooked meat, fish, or shellfish    Immunization history:  Usual childhood vaccines  Flu - last flu vaccine 2016  Tdap - unknown  Hepatitis A - denies  Hepatitis B - denies  Pneumonia - denies      Serologies:  Results for KIKE MARC (MRN [id number]) as of 7/1/2019 11:22   Ref. Range 6/26/2019 08:01 6/26/2019 10:43 6/26/2019 19:01   Hep A IgM Unknown Negative     Hep B C IgM Unknown Negative     Hep B Core Total Ab Unknown   Negative   Hep B S Ab Unknown   Negative   Hepatitis B Surface Ag Unknown Negative     Hepatitis C Ab Unknown Negative     HIV 1/2 Ag/Ab Latest Ref Range: Negative   Negative    RPR Latest Ref Range: Non-reactive    Non-reactive   CMV IgG Interpretation Unknown   Non-Reactive   Strongyloides Ab IgG Latest Ref Range: Negative     Positive (A)   Varicella IgG Latest Ref Range: 0.00 - 0.90 ISR   3.63 (H)   Varicella Interpretation Latest Ref Range: Negative    Positive (A)     Results for KIKE MARC (MRN [id number]) as of 2019 21:24   Ref. Range 2019 19:01   Hepatitis A Antibody IgG Unknown Positive (A)     Micro:  Blood cultures 19 NGTD  Ascitic fluid - gram stain negative, cell count negative for SBP    Imaging:  CXR 19 - bilateral minimal areas of discoid subsegmental atelectasis    Past Medical History:  Past Medical History:   Diagnosis Date    Acute alcoholic hepatitis 2019    Acute liver failure without hepatic coma 2019    Alcohol use disorder, severe, dependence 2019    Coagulopathy 2019    Hepatic encephalopathy 2019    Hyponatremia 2019    Thrombocytopenia due to hypersplenism 2019       Past Surgical History:  Past Surgical History:   Procedure Laterality Date     SECTION      CHOLECYSTECTOMY         Family History:  History reviewed. No pertinent family history.    Social History:  Social History     Socioeconomic History    Marital status:      Spouse name: Not on file    Number of children: Not on file    Years of education: Not on file    Highest education level: Not on file   Occupational History    Not on file   Social Needs    Financial resource strain: Not on file    Food insecurity:     Worry: Not on file     Inability: Not on file    Transportation needs:     Medical: Not on file     Non-medical: Not on file   Tobacco Use    Smoking status: Current Every Day Smoker     Packs/day: 1.00     Years: 15.00     Pack years: 15.00     Types: Cigarettes   Substance and Sexual Activity    Alcohol use: Not Currently     Comment: 19 last drink    Drug use: Never    Sexual activity: Yes     Partners: Female     Birth control/protection: None   Lifestyle    Physical activity:     Days per week: Not on file     Minutes per session: Not on  file    Stress: Not on file   Relationships    Social connections:     Talks on phone: Not on file     Gets together: Not on file     Attends Oriental orthodox service: Not on file     Active member of club or organization: Not on file     Attends meetings of clubs or organizations: Not on file     Relationship status: Not on file   Other Topics Concern    Not on file   Social History Narrative    Not on file       Allergies:  Review of patient's allergies indicates:   Allergen Reactions    Zofran [ondansetron hcl (pf)]      headache       Immunizations:    There is no immunization history on file for this patient.     Review of Systems   Constitution: Positive for decreased appetite, malaise/fatigue, night sweats (non-drenching) and weight gain. Negative for chills, fever and weight loss.   HENT: Positive for congestion, ear pain (occasional with congestion), sore throat (with vomiting) and tinnitus. Negative for hearing loss and hoarse voice.    Eyes: Positive for blurred vision (wears corrective lenses) and visual disturbance. Negative for redness.   Cardiovascular: Positive for chest pain, leg swelling and palpitations.   Respiratory: Positive for hemoptysis (? GI) and sputum production. Negative for cough, shortness of breath and wheezing.    Endocrine: Negative for cold intolerance and heat intolerance.   Hematologic/Lymphatic: Negative for adenopathy. Bruises/bleeds easily.   Skin: Positive for dry skin and itching. Negative for rash and suspicious lesions.   Musculoskeletal: Positive for back pain, joint pain, myalgias and neck pain.   Gastrointestinal: Positive for abdominal pain, constipation, diarrhea (lactulose), heartburn, nausea and vomiting.   Genitourinary: Positive for flank pain, hematuria (microscopic), hesitancy and urgency. Negative for dysuria and frequency.   Neurological: Positive for dizziness, headaches, numbness and weakness. Negative for paresthesias.   Psychiatric/Behavioral: Positive for  depression and memory loss. The patient has insomnia and is nervous/anxious.    Allergic/Immunologic: Negative for environmental allergies, HIV exposure, hives and persistent infections.     Pertinent Medications:  Antibiotics:   Antibiotics (From admission, onward)    Start     Stop Route Frequency Ordered    06/30/19 0900  ciprofloxacin HCl tablet 250 mg      -- Oral Daily 06/29/19 0949    06/26/19 0800  rifAXIMin tablet 550 mg      -- Oral 2 times daily 06/26/19 0749          Physical Exam:  VS (24h):   Vitals:    07/01/19 1120   BP:    Pulse: 81   Resp:    Temp:      Temp:  [97 °F (36.1 °C)-99.2 °F (37.3 °C)]     Physical Exam   Constitutional: She is oriented to person, place, and time. She appears well-developed and well-nourished. No distress.   Chronically ill appearing   HENT:   Head: Normocephalic and atraumatic.   Mouth/Throat: Uvula is midline, oropharynx is clear and moist and mucous membranes are normal. She does not have dentures. No oral lesions. Normal dentition. No dental abscesses, lacerations or dental caries. No oropharyngeal exudate.   Eyes: EOM are normal. Scleral icterus is present.   Neck: Normal range of motion.   Cardiovascular: Normal rate, regular rhythm and normal heart sounds. Exam reveals no gallop and no friction rub.   No murmur heard.  Pulmonary/Chest: Effort normal and breath sounds normal. No respiratory distress. She has no wheezes. She has no rales.   Abdominal: Soft. Bowel sounds are normal. She exhibits distension. There is no hepatosplenomegaly. There is tenderness (non-specific diffuse mild tenderness). There is no guarding.   Musculoskeletal: She exhibits no edema or tenderness.   Lymphadenopathy:        Head (right side): No submental, no submandibular, no tonsillar, no preauricular, no posterior auricular and no occipital adenopathy present.        Head (left side): No submental, no submandibular, no tonsillar, no preauricular, no posterior auricular and no occipital  adenopathy present.     She has no cervical adenopathy.     She has no axillary adenopathy.        Right: No inguinal, no supraclavicular and no epitrochlear adenopathy present.        Left: No inguinal, no supraclavicular and no epitrochlear adenopathy present.   Neurological: She is alert and oriented to person, place, and time.   Some intermittent confusion   Skin: Skin is warm, dry and intact. No rash noted. She is not diaphoretic.   Tattoos  Spider angiomas/petechiae  Bruising bilateral extremities   Psychiatric: She has a normal mood and affect.   Vitals reviewed.        Labs:  CBC:   Lab Results   Component Value Date    WBC 14.56 (H) 07/01/2019    WBC 17.64 (H) 06/30/2019    WBC 20.65 (H) 06/29/2019    WBC 20.61 (H) 06/29/2019    WBC 20.38 (H) 06/28/2019    HGB 7.2 (L) 07/01/2019    HCT 19.8 (LL) 07/01/2019    MCV 97 07/01/2019    PLT 57 (L) 07/01/2019       BMP:   Recent Labs   Lab 07/01/19  0526   GLU 86   *   K 3.9   CL 98   CO2 20*   BUN 59*   CREATININE 2.1*   CALCIUM 10.1   MG 2.6       LFT:   Lab Results   Component Value Date    ALT 10 07/01/2019    AST 81 (H) 07/01/2019    ALKPHOS 225 (H) 07/01/2019    BILITOT 29.1 (H) 07/01/2019       RPR:   Lab Results   Component Value Date    RPR Non-reactive 06/26/2019        Quantiferon Gold Tb: No results found for: QUANTIFERON    Hepatitis Serologies:   Lab Results   Component Value Date    HEPAIGM Negative 06/26/2019    HEPBIGM Negative 06/26/2019    HEPBCAB Negative 06/26/2019    HEPCAB Negative 06/26/2019        Microbiology x 7d:   Microbiology Results (last 7 days)     Procedure Component Value Units Date/Time    Blood culture [[id number]] Collected:  06/26/19 0802    Order Status:  Completed Specimen:  Blood Updated:  07/01/19 1012     Blood Culture, Routine No growth after 5 days.    Blood culture [[id number]] Collected:  06/26/19 0751    Order Status:  Completed Specimen:  Blood Updated:  07/01/19 1012     Blood Culture, Routine No growth after 5  days.    Gram stain [[id number]] Collected:  06/26/19 1607    Order Status:  Completed Specimen:  Body Fluid from Peritoneal Fluid Updated:  06/26/19 1908     Gram Stain Result No WBC's      No organisms seen           Assessment/Plan - Please see top of page

## 2019-07-01 NOTE — PROGRESS NOTES
0836 Received call from lab that patient Hematocrit critical at 19.8. Hemoglobin 7.2.     0838 Paged IML team to notify MD    0845 Dr. Banks returned call and notified. Anticipate PRBC today per MD

## 2019-07-01 NOTE — PLAN OF CARE
Problem: Adult Inpatient Plan of Care  Goal: Plan of Care Review  Outcome: Ongoing (interventions implemented as appropriate)  Plan of care reviewed with patient and family at 1400. Patient spouse verbalized understanding; no evidence of learning from patient at this time. All questions and concerns addressed today. Patient remains free from injury and falls. No acute events today. Patient had liquid pap spear at bedside today. Albumin 25g changed from three times daily to twice daily. Dobutamine stress test scheduled for tomorrow at 1400 - Patient to be NPO AT MIDNIGHT. 1 unit PRBC administered today for hematocrit drop from 21.3 to 19.8. Patient is progressing towards goals. Will continue to monitor. See flowsheets for full assessments and vitals throughout shift.

## 2019-07-01 NOTE — PLAN OF CARE
Patient still jaundice, alert, agile.  Patient expected to discharge home with no needs when medically stable.       07/01/19 1548   Discharge Reassessment   Assessment Type Discharge Planning Reassessment   Discharge Plan A Home with family   Post-Acute Status   Post-Acute Authorization Other   Other Status No Post-Acute Service Needs

## 2019-07-01 NOTE — CONSULTS
Consult   Gynecology    SUBJECTIVE:     Chief Complaint: alcoholic cirrhosis     History of Present Illness:  Destinee Gallagher is a 38 y.o.  with a hx of alcoholic cirrhosis who presented with coffee ground emesis now being worked up for transplant evaluation. GYN consulted for screening pap smear.     She reports her periods are irregular. They are not painful.  She is sexually active with her  and has a hx of BTL for contraception. She denies dyspareunia and post-coital bleeding.   She denies history of abnormal pap smear.   She is uncertain when her last pap smear was.     She denies urinary incontinence.     She denies family history of breast cancer.       Review of patient's allergies indicates:   Allergen Reactions    Zofran [ondansetron hcl (pf)]      headache       Past Medical History:   Diagnosis Date    Acute alcoholic hepatitis 2019    Acute liver failure without hepatic coma 2019    Alcohol use disorder, severe, dependence 2019    Coagulopathy 2019    Hepatic encephalopathy 2019    Hyponatremia 2019    Thrombocytopenia due to hypersplenism 2019     Past Surgical History:   Procedure Laterality Date     SECTION      CHOLECYSTECTOMY       OB History    None       History reviewed. No pertinent family history.  Social History     Tobacco Use    Smoking status: Current Every Day Smoker     Packs/day: 1.00     Years: 15.00     Pack years: 15.00     Types: Cigarettes   Substance Use Topics    Alcohol use: Not Currently     Comment: 19 last drink    Drug use: Never       Current Facility-Administered Medications   Medication    0.9%  NaCl infusion (for blood administration)    acetaminophen tablet 325 mg    albumin human 25% bottle 25 g    ciprofloxacin HCl tablet 250 mg    dextrose 10% (D10W) Bolus    dextrose 10% (D10W) Bolus    folic acid tablet 1 mg    glucagon (human recombinant) injection 1 mg    glucose chewable tablet 16 g     glucose chewable tablet 24 g    lactulose 20 gram/30 mL solution Soln 20 g    lidocaine 5 % patch 2 patch    midodrine tablet 15 mg    nicotine 21 mg/24 hr 1 patch    octreotide injection 100 mcg    pantoprazole EC tablet 40 mg    polyethylene glycol packet 17 g    promethazine tablet 12.5 mg    ramelteon tablet 8 mg    rifAXIMin tablet 550 mg    sodium bicarbonate tablet 1,300 mg    sodium chloride 0.9% flush 5 mL    thiamine tablet 100 mg    traMADol tablet 50 mg       Review of Systems:  Constitutional: positive for activity change, fatigue and negative for fever.   Eyes: Negative for visual disturbance  Respiratory: Negative for cough and shortness of breath.    Cardiovascular: Negative for chest pain and palpitations.   Gastrointestinal:  Negative for abdominal pain, constipation, nausea and vomiting. Positive for diarrhea.  Genitourinary: Negative for dyspareunia, dysuria, frequency, menorrhagia, pelvic pain, vaginal discharge, vaginal pain, dysmenorrhea, urinary incontinence and vaginal odor.   Neurological: Negative for headaches.   Psychiatric/Behavioral: Negative for depression.   Breast: Negative for breast mass, breast pain, nipple discharge and skin changes        OBJECTIVE:    Temp:  [97 °F (36.1 °C)-98.5 °F (36.9 °C)] 97.4 °F (36.3 °C)  Pulse:  [79-98] 85  Resp:  [17-22] 20  SpO2:  [93 %-100 %] 95 %  BP: ()/(51-62) 93/51     Physical Exam:  Constitutional: She is oriented to person, place, and time. She appears chronically ill and jaundiced.   HENT: Head: Normocephalic and atraumatic.   Eyes:  + scleral icterus.   Neck: Neck supple. No thyromegaly present.   Cardiovascular: Normal rate and intact distal pulses.   Pulmonary/Chest: Effort normal. No respiratory distress. She exhibits no tenderness.    Breasts: No inverted nipple, no mass, no nipple discharge, no skin change and no tenderness.   Abdominal: Soft. +distension and TTP generally throughout   Genitourinary: SSE - Vulva  and perineum normal appearing without lesions, rash, or loss of normal structures. No blood or discharge at introitus. Vaginal mucosa pale and moist. Scant yellow discharge in posterior vaginal vault. Cervix visualized - normal appearing, no lesions, not friable. No blood or mucus at external os.    SVE - No masses in bilateral adnexa. Ovaries not palpated. Uterus is midline, small, firm, with smooth contour. No CMT. Minimal tenderness to bimanual palpation. Pap smear performed, with no friability or bleeding noted post pap.   Musculoskeletal: She exhibits no edema or deformity.   Neurological: She is alert and oriented to person, place, and time.   Skin: Skin is warm and dry. +jaundice, + spider angiomas, +bruising  Psychiatric: She has a normal mood and affect. Her behavior is normal. Judgment and thought content normal.        ASSESSMENT:   38 y.o.  with pmh of alcoholic cirrhosis undergoing evaluation for liver transplant      Plan:   Well woman care  -Breast: clinical breast exam negative for mass or tenderness. Mammogram not indicated based on current guidelines. Start annual mammography at age 40.   -Cervix: pt not up to date on cervical cancer screening, but no known hx of abnl pap. Pap smear done today. If normal, repeat in 3 years. If abnormal, GYN will contact primary team.     Thank you for the consult. Will sign off.   Counseling time: 30 minutes    Claudia Templeton M.D.  Obstetrics & Gynecology  PGY-1  GYN Pager # [phone]

## 2019-07-01 NOTE — SUBJECTIVE & OBJECTIVE
Interval History: Patient seen and examined at bedside, complaining of headache. Creatinine function improving to 2.1    Review of patient's allergies indicates:   Allergen Reactions    Zofran [ondansetron hcl (pf)]      headache     Current Facility-Administered Medications   Medication Frequency    0.9%  NaCl infusion (for blood administration) Q24H PRN    acetaminophen tablet 325 mg Q8H PRN    albumin human 25% bottle 25 g BID    ciprofloxacin HCl tablet 250 mg Daily    dextrose 10% (D10W) Bolus PRN    dextrose 10% (D10W) Bolus PRN    folic acid tablet 1 mg Daily    glucagon (human recombinant) injection 1 mg PRN    glucose chewable tablet 16 g PRN    glucose chewable tablet 24 g PRN    lactulose 20 gram/30 mL solution Soln 20 g TID    lidocaine 5 % patch 2 patch Q24H    midodrine tablet 15 mg TID    nicotine 21 mg/24 hr 1 patch Daily    octreotide injection 100 mcg Q8H    pantoprazole EC tablet 40 mg BID AC    polyethylene glycol packet 17 g BID PRN    promethazine tablet 12.5 mg Q6H PRN    ramelteon tablet 8 mg Nightly PRN    rifAXIMin tablet 550 mg BID    sodium bicarbonate tablet 1,300 mg BID    sodium chloride 0.9% flush 5 mL PRN    thiamine tablet 100 mg Daily    traMADol tablet 50 mg Q6H PRN       Objective:     Vital Signs (Most Recent):  Temp: 97.6 °F (36.4 °C) (07/01/19 1519)  Pulse: 86 (07/01/19 1534)  Resp: 18 (07/01/19 1519)  BP: (!) 115/58 (07/01/19 1519)  SpO2: (!) 94 % (07/01/19 1534)  O2 Device (Oxygen Therapy): room air (07/01/19 1534) Vital Signs (24h Range):  Temp:  [97 °F (36.1 °C)-98.5 °F (36.9 °C)] 97.6 °F (36.4 °C)  Pulse:  [79-98] 86  Resp:  [17-22] 18  SpO2:  [93 %-100 %] 94 %  BP: ()/(51-62) 115/58     Weight: 60.9 kg (134 lb 4.2 oz) (07/01/19 0547)  Body mass index is 22.34 kg/m².  Body surface area is 1.67 meters squared.    I/O last 3 completed shifts:  In: 480 [P.O.:480]  Out: 900 [Urine:900]    Physical Exam   Constitutional: She is oriented to  person, place, and time. She appears well-developed and well-nourished. No distress.   Jaundiced   HENT:   Head: Normocephalic and atraumatic.   Nose: Nose normal.   Eyes: Pupils are equal, round, and reactive to light. EOM are normal.   Cardiovascular: Normal rate, regular rhythm and intact distal pulses.   Pulmonary/Chest: Effort normal. No stridor. No respiratory distress.   Abdominal: Soft. She exhibits no distension. There is no tenderness.   +ascites    Neurological: She is oriented to person, place, and time. No cranial nerve deficit.   Skin: Skin is warm and dry. Capillary refill takes less than 2 seconds. She is not diaphoretic.   Psychiatric: She has a normal mood and affect. Her behavior is normal. Judgment and thought content normal.   Vitals reviewed.      Significant Labs:  CBC:   Recent Labs   Lab 07/01/19 0526   WBC 14.56*   RBC 2.04*   HGB 7.2*   HCT 19.8*   PLT 57*   MCV 97   MCH 35.3*   MCHC 36.4*     CMP:   Recent Labs   Lab 07/01/19 0526   GLU 86   CALCIUM 10.1   ALBUMIN 4.1   PROT 6.3   *   K 3.9   CO2 20*   CL 98   BUN 59*   CREATININE 2.1*   ALKPHOS 225*   ALT 10   AST 81*   BILITOT 29.1*     Coagulation:   Recent Labs   Lab 07/01/19 0526   INR 2.4*     All labs within the past 24 hours have been reviewed.

## 2019-07-02 LAB
ALBUMIN SERPL BCP-MCNC: 4.3 G/DL (ref 3.5–5.2)
ALP SERPL-CCNC: 214 U/L (ref 55–135)
ALT SERPL W/O P-5'-P-CCNC: 8 U/L (ref 10–44)
ANION GAP SERPL CALC-SCNC: 15 MMOL/L (ref 8–16)
ASCENDING AORTA: 2.98 CM
AST SERPL-CCNC: 66 U/L (ref 10–40)
BASOPHILS # BLD AUTO: 0.07 K/UL (ref 0–0.2)
BASOPHILS NFR BLD: 0.4 % (ref 0–1.9)
BILIRUB SERPL-MCNC: 31.4 MG/DL (ref 0.1–1)
BSA FOR ECHO PROCEDURE: 1.66 M2
BUN SERPL-MCNC: 52 MG/DL (ref 6–20)
CALCIUM SERPL-MCNC: 10.1 MG/DL (ref 8.7–10.5)
CHLORIDE SERPL-SCNC: 100 MMOL/L (ref 95–110)
CO2 SERPL-SCNC: 20 MMOL/L (ref 23–29)
CREAT SERPL-MCNC: 1.6 MG/DL (ref 0.5–1.4)
CV ECHO LV RWT: 0.35 CM
CV STRESS BASE HR: 81 BPM
DIASTOLIC BLOOD PRESSURE: 56 MMHG
DIFFERENTIAL METHOD: ABNORMAL
DOP CALC LVOT AREA: 2.8 CM2
DOP CALC LVOT DIAMETER: 1.89 CM
DOP CALC LVOT PEAK VEL: 1.29 M/S
DOP CALC LVOT STROKE VOLUME: 75.35 CM3
DOP CALCLVOT PEAK VEL VTI: 26.87 CM
E WAVE DECELERATION TIME: 187.32 MSEC
E/A RATIO: 1.29
E/E' RATIO: 8.3 M/S
ECHO LV POSTERIOR WALL: 0.8 CM (ref 0.6–1.1)
EOSINOPHIL # BLD AUTO: 0.4 K/UL (ref 0–0.5)
EOSINOPHIL NFR BLD: 2.4 % (ref 0–8)
ERYTHROCYTE [DISTWIDTH] IN BLOOD BY AUTOMATED COUNT: 18.4 % (ref 11.5–14.5)
EST. GFR  (AFRICAN AMERICAN): 46.8 ML/MIN/1.73 M^2
EST. GFR  (NON AFRICAN AMERICAN): 40.6 ML/MIN/1.73 M^2
FRACTIONAL SHORTENING: 26 % (ref 28–44)
GLUCOSE SERPL-MCNC: 95 MG/DL (ref 70–110)
HCT VFR BLD AUTO: 24.6 % (ref 37–48.5)
HGB BLD-MCNC: 8.5 G/DL (ref 12–16)
IMM GRANULOCYTES # BLD AUTO: 0.16 K/UL (ref 0–0.04)
IMM GRANULOCYTES NFR BLD AUTO: 1 % (ref 0–0.5)
INR PPP: 2.6 (ref 0.8–1.2)
INTERVENTRICULAR SEPTUM: 0.58 CM (ref 0.6–1.1)
IVRT: 0.06 MSEC
LA MAJOR: 5.62 CM
LA MINOR: 5.13 CM
LA WIDTH: 4.31 CM
LEFT ATRIUM SIZE: 3.9 CM
LEFT ATRIUM VOLUME INDEX: 46.4 ML/M2
LEFT ATRIUM VOLUME: 76.64 CM3
LEFT INTERNAL DIMENSION IN SYSTOLE: 3.37 CM (ref 2.1–4)
LEFT VENTRICLE DIASTOLIC VOLUME INDEX: 56.86 ML/M2
LEFT VENTRICLE DIASTOLIC VOLUME: 93.81 ML
LEFT VENTRICLE MASS INDEX: 58 G/M2
LEFT VENTRICLE SYSTOLIC VOLUME INDEX: 28.1 ML/M2
LEFT VENTRICLE SYSTOLIC VOLUME: 46.29 ML
LEFT VENTRICULAR INTERNAL DIMENSION IN DIASTOLE: 4.53 CM (ref 3.5–6)
LEFT VENTRICULAR MASS: 95 G
LV LATERAL E/E' RATIO: 7 M/S
LV SEPTAL E/E' RATIO: 10.18 M/S
LYMPHOCYTES # BLD AUTO: 1.2 K/UL (ref 1–4.8)
LYMPHOCYTES NFR BLD: 7.1 % (ref 18–48)
MAGNESIUM SERPL-MCNC: 2.6 MG/DL (ref 1.6–2.6)
MCH RBC QN AUTO: 34.1 PG (ref 27–31)
MCHC RBC AUTO-ENTMCNC: 34.6 G/DL (ref 32–36)
MCV RBC AUTO: 99 FL (ref 82–98)
MONOCYTES # BLD AUTO: 0.9 K/UL (ref 0.3–1)
MONOCYTES NFR BLD: 5.3 % (ref 4–15)
MV PEAK A VEL: 0.87 M/S
MV PEAK E VEL: 1.12 M/S
NEUTROPHILS # BLD AUTO: 13.8 K/UL (ref 1.8–7.7)
NEUTROPHILS NFR BLD: 83.8 % (ref 38–73)
NRBC BLD-RTO: 0 /100 WBC
OHS CV CPX 1 MINUTE RECOVERY HEART RATE: 141 BPM
OHS CV CPX 85 PERCENT MAX PREDICTED HEART RATE MALE: 147
OHS CV CPX MAX PREDICTED HEART RATE: 173
OHS CV CPX PATIENT IS FEMALE: 1
OHS CV CPX PATIENT IS MALE: 0
OHS CV CPX PEAK DIASTOLIC BLOOD PRESSURE: 49 MMHG
OHS CV CPX PEAK HEAR RATE: 146 BPM
OHS CV CPX PEAK RATE PRESSURE PRODUCT: ABNORMAL
OHS CV CPX PEAK SYSTOLIC BLOOD PRESSURE: 141 MMHG
OHS CV CPX PERCENT MAX PREDICTED HEART RATE ACHIEVED: 85
OHS CV CPX RATE PRESSURE PRODUCT PRESENTING: 8424
PHOSPHATE SERPL-MCNC: 3.6 MG/DL (ref 2.7–4.5)
PISA TR MAX VEL: 2.53 M/S
PLATELET # BLD AUTO: 58 K/UL (ref 150–350)
PMV BLD AUTO: 12.7 FL (ref 9.2–12.9)
POCT GLUCOSE: 114 MG/DL (ref 70–110)
POCT GLUCOSE: 96 MG/DL (ref 70–110)
POTASSIUM SERPL-SCNC: 3.5 MMOL/L (ref 3.5–5.1)
PROT SERPL-MCNC: 6.4 G/DL (ref 6–8.4)
PROTHROMBIN TIME: 24.8 SEC (ref 9–12.5)
PULM VEIN S/D RATIO: 1.38
PV PEAK D VEL: 0.4 M/S
PV PEAK S VEL: 0.55 M/S
RA MAJOR: 5.43 CM
RA PRESSURE: 3 MMHG
RA WIDTH: 3.31 CM
RBC # BLD AUTO: 2.49 M/UL (ref 4–5.4)
RIGHT VENTRICULAR END-DIASTOLIC DIMENSION: 3.16 CM
RV TISSUE DOPPLER FREE WALL SYSTOLIC VELOCITY 1 (APICAL 4 CHAMBER VIEW): 12.69 CM/S
SINUS: 2.53 CM
SODIUM SERPL-SCNC: 135 MMOL/L (ref 136–145)
STJ: 2.45 CM
SYSTOLIC BLOOD PRESSURE: 104 MMHG
TDI LATERAL: 0.16 M/S
TDI SEPTAL: 0.11 M/S
TDI: 0.14 M/S
TR MAX PG: 26 MMHG
TRICUSPID ANNULAR PLANE SYSTOLIC EXCURSION: 2.11 CM
TV REST PULMONARY ARTERY PRESSURE: 29 MMHG
WBC # BLD AUTO: 16.47 K/UL (ref 3.9–12.7)

## 2019-07-02 PROCEDURE — S4991 NICOTINE PATCH NONLEGEND: HCPCS | Performed by: HOSPITALIST

## 2019-07-02 PROCEDURE — 83735 ASSAY OF MAGNESIUM: CPT

## 2019-07-02 PROCEDURE — 85025 COMPLETE CBC W/AUTO DIFF WBC: CPT

## 2019-07-02 PROCEDURE — 80053 COMPREHEN METABOLIC PANEL: CPT

## 2019-07-02 PROCEDURE — 36415 COLL VENOUS BLD VENIPUNCTURE: CPT

## 2019-07-02 PROCEDURE — 88141 CYTOPATH C/V INTERPRET: CPT | Mod: ,,, | Performed by: PATHOLOGY

## 2019-07-02 PROCEDURE — 63600175 PHARM REV CODE 636 W HCPCS: Performed by: HOSPITALIST

## 2019-07-02 PROCEDURE — 63600150 PHARM REV CODE 636: Performed by: HOSPITALIST

## 2019-07-02 PROCEDURE — 85610 PROTHROMBIN TIME: CPT

## 2019-07-02 PROCEDURE — 88175 CYTOPATH C/V AUTO FLUID REDO: CPT | Performed by: PATHOLOGY

## 2019-07-02 PROCEDURE — 25000003 PHARM REV CODE 250: Performed by: PHYSICIAN ASSISTANT

## 2019-07-02 PROCEDURE — 20600001 HC STEP DOWN PRIVATE ROOM

## 2019-07-02 PROCEDURE — 99233 SBSQ HOSP IP/OBS HIGH 50: CPT | Mod: ,,, | Performed by: HOSPITALIST

## 2019-07-02 PROCEDURE — 99231 PR SUBSEQUENT HOSPITAL CARE,LEVL I: ICD-10-PCS | Mod: ,,, | Performed by: INTERNAL MEDICINE

## 2019-07-02 PROCEDURE — 25000003 PHARM REV CODE 250: Performed by: INTERNAL MEDICINE

## 2019-07-02 PROCEDURE — 25000003 PHARM REV CODE 250: Performed by: HOSPITALIST

## 2019-07-02 PROCEDURE — 99233 PR SUBSEQUENT HOSPITAL CARE,LEVL III: ICD-10-PCS | Mod: ,,, | Performed by: HOSPITALIST

## 2019-07-02 PROCEDURE — 88141 LIQUID-BASED PAP SMEAR, SCREENING: ICD-10-PCS | Mod: ,,, | Performed by: PATHOLOGY

## 2019-07-02 PROCEDURE — 99231 SBSQ HOSP IP/OBS SF/LOW 25: CPT | Mod: ,,, | Performed by: INTERNAL MEDICINE

## 2019-07-02 PROCEDURE — 84100 ASSAY OF PHOSPHORUS: CPT

## 2019-07-02 RX ORDER — ATROPINE SULFATE 0.1 MG/ML
2 INJECTION INTRAVENOUS
Status: COMPLETED | OUTPATIENT
Start: 2019-07-02 | End: 2019-07-02

## 2019-07-02 RX ORDER — CIPROFLOXACIN 500 MG/1
500 TABLET ORAL EVERY 24 HOURS
Status: DISCONTINUED | OUTPATIENT
Start: 2019-07-03 | End: 2019-07-03

## 2019-07-02 RX ORDER — IVERMECTIN 3 MG/1
200 TABLET ORAL DAILY
Status: COMPLETED | OUTPATIENT
Start: 2019-07-03 | End: 2019-07-04

## 2019-07-02 RX ORDER — DOBUTAMINE HYDROCHLORIDE 100 MG/100ML
60 INJECTION INTRAVENOUS
Status: COMPLETED | OUTPATIENT
Start: 2019-07-02 | End: 2019-07-02

## 2019-07-02 RX ADMIN — LACTULOSE 20 G: 20 SOLUTION ORAL at 09:07

## 2019-07-02 RX ADMIN — RIFAXIMIN 550 MG: 550 TABLET ORAL at 09:07

## 2019-07-02 RX ADMIN — PROMETHAZINE HYDROCHLORIDE 12.5 MG: 12.5 TABLET ORAL at 09:07

## 2019-07-02 RX ADMIN — MIDODRINE HYDROCHLORIDE 15 MG: 5 TABLET ORAL at 09:07

## 2019-07-02 RX ADMIN — MIDODRINE HYDROCHLORIDE 15 MG: 5 TABLET ORAL at 05:07

## 2019-07-02 RX ADMIN — OCTREOTIDE ACETATE 100 MCG: 100 INJECTION, SOLUTION INTRAVENOUS; SUBCUTANEOUS at 05:07

## 2019-07-02 RX ADMIN — PANTOPRAZOLE SODIUM 40 MG: 40 TABLET, DELAYED RELEASE ORAL at 04:07

## 2019-07-02 RX ADMIN — DOBUTAMINE IN DEXTROSE 60 MCG/KG/MIN: 100 INJECTION, SOLUTION INTRAVENOUS at 04:07

## 2019-07-02 RX ADMIN — LIDOCAINE 2 PATCH: 50 PATCH TOPICAL at 09:07

## 2019-07-02 RX ADMIN — SODIUM BICARBONATE 650 MG TABLET 1300 MG: at 09:07

## 2019-07-02 RX ADMIN — ATROPINE SULFATE 2 MG: 0.1 INJECTION, SOLUTION INTRAVENOUS at 04:07

## 2019-07-02 RX ADMIN — TRAMADOL HYDROCHLORIDE 50 MG: 50 TABLET, FILM COATED ORAL at 09:07

## 2019-07-02 RX ADMIN — PANTOPRAZOLE SODIUM 40 MG: 40 TABLET, DELAYED RELEASE ORAL at 06:07

## 2019-07-02 RX ADMIN — CIPROFLOXACIN HYDROCHLORIDE 250 MG: 250 TABLET, FILM COATED ORAL at 09:07

## 2019-07-02 RX ADMIN — FOLIC ACID 1 MG: 1 TABLET ORAL at 09:07

## 2019-07-02 RX ADMIN — NICOTINE 1 PATCH: 21 PATCH, EXTENDED RELEASE TRANSDERMAL at 09:07

## 2019-07-02 RX ADMIN — Medication 100 MG: at 09:07

## 2019-07-02 RX ADMIN — RAMELTEON 8 MG: 8 TABLET, FILM COATED ORAL at 09:07

## 2019-07-02 NOTE — SUBJECTIVE & OBJECTIVE
Interval History: Patient seen and examined at bedside, creatinine much improved compared to admission. Clinically improving.     Review of patient's allergies indicates:   Allergen Reactions    Zofran [ondansetron hcl (pf)]      headache     Current Facility-Administered Medications   Medication Frequency    acetaminophen tablet 325 mg Q8H PRN    [START ON 7/3/2019] ciprofloxacin HCl tablet 500 mg Daily    dextrose 10% (D10W) Bolus PRN    dextrose 10% (D10W) Bolus PRN    folic acid tablet 1 mg Daily    glucagon (human recombinant) injection 1 mg PRN    glucose chewable tablet 16 g PRN    glucose chewable tablet 24 g PRN    lactulose 20 gram/30 mL solution Soln 20 g TID    lidocaine 5 % patch 2 patch Q24H    midodrine tablet 15 mg TID    nicotine 21 mg/24 hr 1 patch Daily    octreotide injection 100 mcg Q8H    pantoprazole EC tablet 40 mg BID AC    polyethylene glycol packet 17 g BID PRN    promethazine tablet 12.5 mg Q6H PRN    ramelteon tablet 8 mg Nightly PRN    rifAXIMin tablet 550 mg BID    sodium bicarbonate tablet 1,300 mg BID    sodium chloride 0.9% flush 5 mL PRN    thiamine tablet 100 mg Daily    traMADol tablet 50 mg Q6H PRN       Objective:     Vital Signs (Most Recent):  Temp: 98.3 °F (36.8 °C) (07/02/19 0741)  Pulse: 90 (07/02/19 0741)  Resp: 18 (07/02/19 0741)  BP: 109/61 (07/02/19 0741)  SpO2: (!) 91 % (07/02/19 0741)  O2 Device (Oxygen Therapy): room air (07/01/19 1534) Vital Signs (24h Range):  Temp:  [97.3 °F (36.3 °C)-98.3 °F (36.8 °C)] 98.3 °F (36.8 °C)  Pulse:  [77-91] 90  Resp:  [17-20] 18  SpO2:  [91 %-97 %] 91 %  BP: ()/(51-77) 109/61     Weight: 60.8 kg (134 lb 0.6 oz) (07/02/19 0600)  Body mass index is 22.31 kg/m².  Body surface area is 1.67 meters squared.    I/O last 3 completed shifts:  In: 558.8 [P.O.:240; Blood:318.8]  Out: 600 [Urine:600]    Physical Exam   Constitutional: She is oriented to person, place, and time. She appears well-developed and  well-nourished. No distress.   Jaundiced   HENT:   Head: Normocephalic and atraumatic.   Nose: Nose normal.   Eyes: Pupils are equal, round, and reactive to light. EOM are normal.   Cardiovascular: Normal rate, regular rhythm and intact distal pulses.   Pulmonary/Chest: Effort normal. No stridor. No respiratory distress.   Abdominal: Soft. She exhibits no distension. There is no tenderness.   +ascites    Neurological: She is oriented to person, place, and time. No cranial nerve deficit.   Skin: Skin is warm and dry. Capillary refill takes less than 2 seconds. She is not diaphoretic.   Psychiatric: She has a normal mood and affect. Her behavior is normal. Judgment and thought content normal.   Vitals reviewed.      Significant Labs:  CBC:   Recent Labs   Lab 07/02/19 0446   WBC 16.47*   RBC 2.49*   HGB 8.5*   HCT 24.6*   PLT 58*   MCV 99*   MCH 34.1*   MCHC 34.6     CMP:   Recent Labs   Lab 07/02/19 0445   GLU 95   CALCIUM 10.1   ALBUMIN 4.3   PROT 6.4   *   K 3.5   CO2 20*      BUN 52*   CREATININE 1.6*   ALKPHOS 214*   ALT 8*   AST 66*   BILITOT 31.4*     Coagulation:   Recent Labs   Lab 07/02/19 0445   INR 2.6*     All labs within the past 24 hours have been reviewed.     Significant Imaging:  Labs: Reviewed

## 2019-07-02 NOTE — ASSESSMENT & PLAN NOTE
ZULAY in the setting of cirrhosis/infection/GI bleed/hypotension likely 2/2 ATN vs HRS  - delta creatinine improving with good UOP  - Creatinine trend 2.8-->2.1-->1.6, negative 3.8   - agree with midodrine/octreotide, can dc albumin for now   - continue supportive care,    - free water restriction- Na trending up  US non specific, no obstruction  - will sign off but may re consult should kidney function deteriorate

## 2019-07-02 NOTE — HPI
38 y.o. female with history of ETOH ESLD who presented to [facility] on 6/26/2019 with decompensated liver failure associated with hepatic encephalopathy, severe hyponatremia, as well as development of upper GI bleed on 06/27/2019, and ZULAY. On presentation to [facility], creatinine increasing 1.6 and today up to 3.0. Na 123 from 118. Patient has been treated for SBP and diuretics. Now off diuretics, on albumin and midodrine/octreotide. Denies diarrhea, N/V, abd pain, SOB.

## 2019-07-02 NOTE — ASSESSMENT & PLAN NOTE
ZULAY in the setting of cirrhosis/infection/GI bleed/hypotension likely 2/2 ATN vs HRS  - delta creatinine improving with good UOP  - Creatinine trend 2.8-->2.1, negative 3.8   - agree with midodrine/octreotide, can dc albumin for now   - continue supportive care,    - free water restriction- Na trending up  US non specific, no obstruction  - may DC Albumin to avoid fluid overload, close to 4.1  - will follow peripherally

## 2019-07-02 NOTE — PROGRESS NOTES
[facility]  Nephrology  Progress Note    Patient Name: Destinee Gallagher  MRN: [id number]  Admission Date: 6/26/2019  Hospital Length of Stay: 5 days  Attending Provider: Marisa Banks MD   Primary Care Physician: Harsha Larson  Principal Problem:Acute liver failure without hepatic coma    Subjective:     HPI: 38 y.o. female with history of ETOH ESLD who presented to [facility] on 6/26/2019 with decompensated liver failure associated with hepatic encephalopathy, severe hyponatremia, as well as development of upper GI bleed on 06/27/2019, and ZULAY. On presentation to [facility], creatinine increasing 1.6 and today up to 3.0. Na 123 from 118. Patient has been treated for SBP and diuretics. Now off diuretics, on albumin and midodrine/octreotide. Denies diarrhea, N/V, abd pain, SOB.      Interval History: Patient seen and examined at bedside, complaining of headache. Creatinine function improving to 2.1    Review of patient's allergies indicates:   Allergen Reactions    Zofran [ondansetron hcl (pf)]      headache     Current Facility-Administered Medications   Medication Frequency    0.9%  NaCl infusion (for blood administration) Q24H PRN    acetaminophen tablet 325 mg Q8H PRN    albumin human 25% bottle 25 g BID    ciprofloxacin HCl tablet 250 mg Daily    dextrose 10% (D10W) Bolus PRN    dextrose 10% (D10W) Bolus PRN    folic acid tablet 1 mg Daily    glucagon (human recombinant) injection 1 mg PRN    glucose chewable tablet 16 g PRN    glucose chewable tablet 24 g PRN    lactulose 20 gram/30 mL solution Soln 20 g TID    lidocaine 5 % patch 2 patch Q24H    midodrine tablet 15 mg TID    nicotine 21 mg/24 hr 1 patch Daily    octreotide injection 100 mcg Q8H    pantoprazole EC tablet 40 mg BID AC    polyethylene glycol packet 17 g BID PRN    promethazine tablet 12.5 mg Q6H PRN    ramelteon tablet 8 mg Nightly PRN    rifAXIMin tablet 550 mg BID    sodium bicarbonate tablet 1,300 mg BID     sodium chloride 0.9% flush 5 mL PRN    thiamine tablet 100 mg Daily    traMADol tablet 50 mg Q6H PRN       Objective:     Vital Signs (Most Recent):  Temp: 97.6 °F (36.4 °C) (07/01/19 1519)  Pulse: 86 (07/01/19 1534)  Resp: 18 (07/01/19 1519)  BP: (!) 115/58 (07/01/19 1519)  SpO2: (!) 94 % (07/01/19 1534)  O2 Device (Oxygen Therapy): room air (07/01/19 1534) Vital Signs (24h Range):  Temp:  [97 °F (36.1 °C)-98.5 °F (36.9 °C)] 97.6 °F (36.4 °C)  Pulse:  [79-98] 86  Resp:  [17-22] 18  SpO2:  [93 %-100 %] 94 %  BP: ()/(51-62) 115/58     Weight: 60.9 kg (134 lb 4.2 oz) (07/01/19 0547)  Body mass index is 22.34 kg/m².  Body surface area is 1.67 meters squared.    I/O last 3 completed shifts:  In: 480 [P.O.:480]  Out: 900 [Urine:900]    Physical Exam   Constitutional: She is oriented to person, place, and time. She appears well-developed and well-nourished. No distress.   Jaundiced   HENT:   Head: Normocephalic and atraumatic.   Nose: Nose normal.   Eyes: Pupils are equal, round, and reactive to light. EOM are normal.   Cardiovascular: Normal rate, regular rhythm and intact distal pulses.   Pulmonary/Chest: Effort normal. No stridor. No respiratory distress.   Abdominal: Soft. She exhibits no distension. There is no tenderness.   +ascites    Neurological: She is oriented to person, place, and time. No cranial nerve deficit.   Skin: Skin is warm and dry. Capillary refill takes less than 2 seconds. She is not diaphoretic.   Psychiatric: She has a normal mood and affect. Her behavior is normal. Judgment and thought content normal.   Vitals reviewed.      Significant Labs:  CBC:   Recent Labs   Lab 07/01/19 0526   WBC 14.56*   RBC 2.04*   HGB 7.2*   HCT 19.8*   PLT 57*   MCV 97   MCH 35.3*   MCHC 36.4*     CMP:   Recent Labs   Lab 07/01/19  0526   GLU 86   CALCIUM 10.1   ALBUMIN 4.1   PROT 6.3   *   K 3.9   CO2 20*   CL 98   BUN 59*   CREATININE 2.1*   ALKPHOS 225*   ALT 10   AST 81*   BILITOT 29.1*      Coagulation:   Recent Labs   Lab 07/01/19  0526   INR 2.4*     All labs within the past 24 hours have been reviewed.        Assessment/Plan:     * Acute liver failure without hepatic coma  MELD-Na score: 37 at 7/1/2019  5:26 AM  MELD score: 36 at 7/1/2019  5:26 AM  Calculated from:  Serum Creatinine: 2.1 mg/dL at 7/1/2019  5:26 AM  Serum Sodium: 132 mmol/L at 7/1/2019  5:26 AM  Total Bilirubin: 29.1 mg/dL at 7/1/2019  5:26 AM  INR(ratio): 2.4 at 7/1/2019  5:26 AM  Age: 38 years  Treatment as per hepatology and primary care     Hepatorenal syndrome  ZULAY in the setting of cirrhosis/infection/GI bleed/hypotension likely 2/2 ATN vs HRS  - delta creatinine improving with good UOP  - Creatinine trend 2.8-->2.1, negative 3.8   - agree with midodrine/octreotide, can dc albumin for now   - continue supportive care,    - free water restriction- Na trending up  US non specific, no obstruction  - may DC Albumin to avoid fluid overload, close to 4.1  - will follow peripherally     Hyponatremia  Improving, cont sodium bicarbonate tablets  Avoid overcorrection     Acute alcoholic hepatitis  Per primary         Thank you for your consult. I will follow-up with patient. Please contact us if you have any additional questions.    Rodriguez Herr MD  Nephrology  [facility]

## 2019-07-02 NOTE — PROGRESS NOTES
[facility]  Nephrology  Progress Note    Patient Name: Destinee Gallagher  MRN: [id number]  Admission Date: 6/26/2019  Hospital Length of Stay: 6 days  Attending Provider: Darrick Knapp MD   Primary Care Physician: Harsha Larson  Principal Problem:Acute liver failure without hepatic coma    Subjective:     HPI: 38 y.o. female with history of ETOH ESLD who presented to [facility] on 6/26/2019 with decompensated liver failure associated with hepatic encephalopathy, severe hyponatremia, as well as development of upper GI bleed on 06/27/2019, and ZULAY. [place] [facility], creatinine increasing 1.6 and today up to 3.0. Na 123 from 118. Patient has been treated for SBP and diuretics. Now off diuretics, on albumin and midodrine/octreotide. Denies diarrhea, N/V, abd pain, SOB.      Interval History: Patient seen and examined at bedside, creatinine much improved compared to admission. Clinically improving.     Review of patient's allergies indicates:   Allergen Reactions    Zofran [ondansetron hcl (pf)]      headache     Current Facility-Administered Medications   Medication Frequency    acetaminophen tablet 325 mg Q8H PRN    [START ON 7/3/2019] ciprofloxacin HCl tablet 500 mg Daily    dextrose 10% (D10W) Bolus PRN    dextrose 10% (D10W) Bolus PRN    folic acid tablet 1 mg Daily    glucagon (human recombinant) injection 1 mg PRN    glucose chewable tablet 16 g PRN    glucose chewable tablet 24 g PRN    lactulose 20 gram/30 mL solution Soln 20 g TID    lidocaine 5 % patch 2 patch Q24H    midodrine tablet 15 mg TID    nicotine 21 mg/24 hr 1 patch Daily    octreotide injection 100 mcg Q8H    pantoprazole EC tablet 40 mg BID AC    polyethylene glycol packet 17 g BID PRN    promethazine tablet 12.5 mg Q6H PRN    ramelteon tablet 8 mg Nightly PRN    rifAXIMin tablet 550 mg BID    sodium bicarbonate tablet 1,300 mg BID    sodium chloride 0.9% flush 5 mL PRN    thiamine tablet 100 mg Daily     traMADol tablet 50 mg Q6H PRN       Objective:     Vital Signs (Most Recent):  Temp: 98.3 °F (36.8 °C) (07/02/19 0741)  Pulse: 90 (07/02/19 0741)  Resp: 18 (07/02/19 0741)  BP: 109/61 (07/02/19 0741)  SpO2: (!) 91 % (07/02/19 0741)  O2 Device (Oxygen Therapy): room air (07/01/19 1534) Vital Signs (24h Range):  Temp:  [97.3 °F (36.3 °C)-98.3 °F (36.8 °C)] 98.3 °F (36.8 °C)  Pulse:  [77-91] 90  Resp:  [17-20] 18  SpO2:  [91 %-97 %] 91 %  BP: ()/(51-77) 109/61     Weight: 60.8 kg (134 lb 0.6 oz) (07/02/19 0600)  Body mass index is 22.31 kg/m².  Body surface area is 1.67 meters squared.    I/O last 3 completed shifts:  In: 558.8 [P.O.:240; Blood:318.8]  Out: 600 [Urine:600]    Physical Exam   Constitutional: She is oriented to person, place, and time. She appears well-developed and well-nourished. No distress.   Jaundiced   HENT:   Head: Normocephalic and atraumatic.   Nose: Nose normal.   Eyes: Pupils are equal, round, and reactive to light. EOM are normal.   Cardiovascular: Normal rate, regular rhythm and intact distal pulses.   Pulmonary/Chest: Effort normal. No stridor. No respiratory distress.   Abdominal: Soft. She exhibits no distension. There is no tenderness.   +ascites    Neurological: She is oriented to person, place, and time. No cranial nerve deficit.   Skin: Skin is warm and dry. Capillary refill takes less than 2 seconds. She is not diaphoretic.   Psychiatric: She has a normal mood and affect. Her behavior is normal. Judgment and thought content normal.   Vitals reviewed.      Significant Labs:  CBC:   Recent Labs   Lab 07/02/19  0446   WBC 16.47*   RBC 2.49*   HGB 8.5*   HCT 24.6*   PLT 58*   MCV 99*   MCH 34.1*   MCHC 34.6     CMP:   Recent Labs   Lab 07/02/19 0445   GLU 95   CALCIUM 10.1   ALBUMIN 4.3   PROT 6.4   *   K 3.5   CO2 20*      BUN 52*   CREATININE 1.6*   ALKPHOS 214*   ALT 8*   AST 66*   BILITOT 31.4*     Coagulation:   Recent Labs   Lab 07/02/19 0445   INR 2.6*     All  labs within the past 24 hours have been reviewed.     Significant Imaging:  Labs: Reviewed    Assessment/Plan:     * Acute liver failure without hepatic coma  MELD-Na score: 35 at 7/2/2019  4:45 AM  MELD score: 35 at 7/2/2019  4:45 AM  Calculated from:  Serum Creatinine: 1.6 mg/dL at 7/2/2019  4:45 AM  Serum Sodium: 135 mmol/L at 7/2/2019  4:45 AM  Total Bilirubin: 31.4 mg/dL at 7/2/2019  4:45 AM  INR(ratio): 2.6 at 7/2/2019  4:45 AM  Age: 38 years  Treatment as per hepatology and primary care     Hepatorenal syndrome  ZULAY in the setting of cirrhosis/infection/GI bleed/hypotension likely 2/2 ATN vs HRS  - delta creatinine improving with good UOP  - Creatinine trend 2.8-->2.1-->1.6, negative 3.8   - agree with midodrine/octreotide, can dc albumin for now   - continue supportive care,    - free water restriction- Na trending up  US non specific, no obstruction  - will sign off but may re consult should kidney function deteriorate     Hyponatremia  Improving, cont sodium bicarbonate tablets given metabolic acidosis   Avoid overcorrection   Monitor for fluid status such as worsening ascites and pitting edema         Thank you for your consult. I will sign off. Please contact us if you have any additional questions.    Rodriguez Herr MD  Nephrology  [facility]

## 2019-07-02 NOTE — PROGRESS NOTES
Pap repeated due to error in processing prior sample and sent to pathology.     Claudia Templeton M.D.   OB/GYN  PGY-1

## 2019-07-02 NOTE — ASSESSMENT & PLAN NOTE
Improving, cont sodium bicarbonate tablets given metabolic acidosis   Avoid overcorrection   Monitor for fluid status such as worsening ascites and pitting edema

## 2019-07-02 NOTE — ASSESSMENT & PLAN NOTE
MELD-Na score: 35 at 7/2/2019  4:45 AM  MELD score: 35 at 7/2/2019  4:45 AM  Calculated from:  Serum Creatinine: 1.6 mg/dL at 7/2/2019  4:45 AM  Serum Sodium: 135 mmol/L at 7/2/2019  4:45 AM  Total Bilirubin: 31.4 mg/dL at 7/2/2019  4:45 AM  INR(ratio): 2.6 at 7/2/2019  4:45 AM  Age: 38 years  Treatment as per hepatology and primary care

## 2019-07-02 NOTE — PROGRESS NOTES
Progress Note  Hospital Medicine  [facility], [place]       Patient Name: Destinee Gallagher  MRN:  [id number]  Hospital Medicine Team: [facility] Marisa Banks MD  Date of Admission:  6/26/2019     Length of Stay:  LOS: 5 days   Expected Discharge Date: 7/5/2019  Principal Problem:  Acute liver failure without hepatic coma     Subjective:     Interval History/Overnight Events:      Doing well today clinically , with no acute distress. No acute problems today.  1 u prbcs given on 7/1 for decreasing hbg down to 7.2. No overt bleeding.  Possibly from PHG  patient may require an EGD  MELD improved to 37 today. Cr down to 2.1 from 2.8. IV albumin stopped due to normal serum albumin at this time. Na up to 132. Undergoing eval - seen by ID and GYN. Dobutamine stress echo tomorrow      ciprofloxacin HCl  250 mg Oral Daily    folic acid  1 mg Oral Daily    lactulose  20 g Oral TID    lidocaine  2 patch Transdermal Q24H    midodrine  15 mg Oral TID    nicotine  1 patch Transdermal Daily    octreotide  100 mcg Intravenous Q8H    pantoprazole  40 mg Oral BID AC    rifAXImin  550 mg Oral BID    sodium bicarbonate  1,300 mg Oral BID    thiamine  100 mg Oral Daily           sodium chloride, acetaminophen, Dextrose 10% Bolus, Dextrose 10% Bolus, glucagon (human recombinant), glucose, glucose, polyethylene glycol, promethazine, ramelteon, sodium chloride 0.9%, traMADol    Review of Systems   Constitutional: Negative for chills, fatigue, fever.   HENT: Negative for sore throat, trouble swallowing.    Eyes: Negative for photophobia, visual disturbance.   Respiratory: Negative for cough, shortness of breath.    Cardiovascular: Negative for chest pain, palpitations, leg swelling.   Gastrointestinal: Negative for abdominal pain, constipation, diarrhea, nausea, vomiting.   Endocrine: Negative for cold intolerance, heat intolerance.   Genitourinary: Negative for dysuria, frequency.   Musculoskeletal:  Negative for arthralgias, myalgias.   Skin: Negative for rash, wound, erythema   Neurological: Negative for dizziness, syncope, weakness, light-headedness.   Psychiatric/Behavioral: Negative for confusion, hallucinations, anxiety  All other systems reviewed and are negative.    Objective:     Temp:  [97 °F (36.1 °C)-98.4 °F (36.9 °C)]   Pulse:  [77-98]   Resp:  [17-22]   BP: ()/(51-77)   SpO2:  [93 %-100 %]       Physical Exam:  Constitutional: appears older than stated age, chronically ill looking,  non-distressed, not diaphoretic.   HENT: NC/AT, external ears normal, oropharynx clear, MMM w/o exudates.   Eyes: PERRL, EOMI, conjunctiva normal, no discharge b/l, +scleral icterus   Neck: normal ROM, supple  CV: RRR, no m/r/g, no carotid bruits, +2 peripheral pulses.  Pulmonary/Chest wall: Breathing comfortably w/o distress, CTAB, no w/r/r, no crackles.  Decreased breath sounds at lung bases  GI: Soft, non-tender, (+) BS, (+) BM   +distended, tender palpation    Musculoskeletal: Normal ROM, no atrophy,  +no pitting edema in LE bilaterally   Neurological: AAO x 4, CN II-XI in tact, nl sensation, nl strength/tone    No asterixis   Skin: warm, dry   + jaundice, + significant spider angiomas, +bruising   Psych: normal mood and affect, normal behavior, thought content and judgement.    Labs:    Chemistries:   Recent Labs   Lab 06/29/19  0353 06/29/19  1532 06/30/19  0621 07/01/19  0526   * 126* 130* 132*   K 4.5 4.2 3.7 3.9   CL 92* 95 97 98   CO2 16* 15* 19* 20*   BUN 73* 70* 68* 59*   CREATININE 3.0* 2.8* 2.8* 2.1*   CALCIUM 9.2 9.8 10.2 10.1   PROT 5.5*  --  6.1 6.3   BILITOT 27.3*  --  28.6* 29.1*   ALKPHOS 254*  --  247* 225*   ALT 12  --  11 10   AST 97*  --  72* 81*   MG 2.5  --  2.7* 2.6   PHOS 6.6*  --  5.1* 4.4        WBC:   Recent Labs   Lab 06/28/19  1704 06/29/19  0353 06/29/19  1532 06/30/19  0621 07/01/19  0526   WBC 20.38* 20.61* 20.65* 17.64* 14.56*     Bands:     CBC/Anemia Labs: Coags:     Recent Labs   Lab 06/29/19  1532 06/30/19  0621 07/01/19  0526   WBC 20.65* 17.64* 14.56*   HGB 8.0* 7.8* 7.2*   HCT 21.7* 21.6* 19.8*   PLT 68* 77* 57*   MCV 96 96 97   RDW 16.8* 16.6* 16.3*    Recent Labs   Lab 06/29/19  0353 06/30/19  0621 07/01/19  0526   INR 2.5* 2.5* 2.4*        POCT Glucose: HbA1c:    Recent Labs   Lab 06/28/19  1723 06/29/19  0028 06/29/19  0613 06/29/19  2208 06/30/19  2219 07/01/19  2211   POCTGLUCOSE 90 171* 159* 119* 101 111*    No results found for: HGBA1C       Assessment and Plan     Hospital Course:    Ms. Destinee Gallagher is a 38 y.o. female who presented to [facility] on 6/26/2019 with ETOH hepatitis and acute liver failure , associated with hepatic encephalopathy, Severe hyponatremia with sodium level of 117, ascites, coagulopathy and thrombocytopenia, as well as development of upper GI bleed on 06/27/2019, and  ZULAY on 06/27/2019    Active Hospital Problems    Diagnosis  POA    *Acute liver failure without hepatic coma [K72.00]  Yes    Infection due to strongyloides [B78.9]  Yes    Coffee ground emesis [K92.0]  Yes    Upper GI bleed [K92.2]  No    Acute blood loss anemia [D62]  No    Hepatorenal syndrome [K76.7]  No    Acute alcoholic hepatitis [K70.10]  Yes    Ascites due to alcoholic hepatitis [K70.11]  Yes     Has had hx of SBP , as per family       Hepatic encephalopathy [K72.90]  Yes    Coagulopathy [D68.9]  Yes    Hyponatremia [E87.1]  Yes    Alcohol use disorder, severe, dependence [F10.20]  Yes    Macrocytic anemia [D53.9]  Yes    Leukocytosis [D72.829]  Yes    Hypotension [I95.9]  Yes    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     Acute liver failure without hepatic coma  Acute alcoholic  hepatitis with ascites  Alcoholic cirrhosis of liver with ascites     MELD-Na score: 37 at 7/1/2019  5:26 AM  MELD score: 36 at 7/1/2019  5:26 AM  Calculated from:  Serum Creatinine: 2.1 mg/dL at 7/1/2019  5:26 AM  Serum Sodium: 132 mmol/L at 7/1/2019  5:26 AM  Total Bilirubin: 29.1 mg/dL at 7/1/2019  5:26 AM  INR(ratio): 2.4 at 7/1/2019  5:26 AM  Age: 38 years     - decompensation due to recent alcohol binge drinking.  Meld score of 35 on admission  - hepatology consulted.  Obtaining financial approval for initiation of inpatient liver transplant evaluation  - [facility]  - viral and autoimmune markers negative  - LVP on 06/26/2019,  3.4 L drained.  No SBP found on ascitic fluid.  Needs at least p.o. ciprofloxacin prophylaxis given history of SBP in the past  - treat hypotension with midodrine.   - p.o. Lactulose and PO rifaximin for hepatic encephalopathy  - IV vitamin K for coagulopathy  - Psychiatry consulted on the case. deemed patient high risk, needs ETOH rehab  - serology for liver transplant evaluation -ordered and pending.  - SBP ppx with PO cipro as patient has had hx of SBP  - patient with decompensation on 06/27/2019 and some of the pre liver transplant testing and imaging was deferred that day. Restarted on 6/28. Needs GYN, ID, mammo, LTS, , ID + strongyloides and will need therapy     Upper GI bleed  Acute blood loss anemia  - patient with coffee-ground emesis on 06/27/2019 with hemoglobin decreased from 10-8.8.  May be due to portal hypertensive gastropathy, however given history of cirrhosis, there is definitely concern for variceal bleeding  - patient started on IV ceftriaxone for SBP prophylaxis in lieu of GI bleeding  - started on IV octreotide drip and  IV PPI b.i.d. On 6/27. Planned  for EGD on 06/28/2019 but procedure was deferred due to high chance of decompensation  - will plan to transfuse for hemoglobin likely <8  - 1 unit of FFP transfusion on 06/27/2019 given significant coagulopathy with  INR greater than 2 and active bleeding  - IV oct gtt and IV ppi stopped on 6/28 , IV ceftriaxone de-escalated to Cipro prophylaxis.  Plan to monitor for further bleeding and reassess for necessity for EGD  - 1 u prbcs given on 7/1 for decreasing hbg down to 7.2.  patient may require an EGD       Hepatorenal syndrome  - creatinine increasing to 2.1 on 06/27/2019.  Creatinine on admission 1.3, patient was receiving diuretics at outside hospital.  Urine sodium is less than 20, with ascites and hypotension.  Which is highly highly concerning for hepatorenal syndrome  - continue max dose midodrine 15 t.i.d.  - patient has been on subcu octreotide and transition to octreotide drip for upper GI bleeding  - ICU consulted on 06/27/2019 given tenuous blood pressures, worsening encephalopathy, worsening renal failure, significant hyponatremia,  upper GI bleeding.  Patient will need close monitoring and possibly transfer for pressors to the ICU, when deemed appropriate by the ICU team  - cont midodrine, IV octreotide and albumin.   - 6/29 - Nephrology consulted. ICU consulted for IV pressors in setting of HRS and low BPs. Nephrology recommended current regimen and no IV pressors at this time   - IV albumin stopped 7/1 due to Cr down to 2.1 and normal serum albumin     Acute on Chronic hepatic encephalopathy  - PO lactulose  - PO rifaximin  - monitor BMs, goal of 3-4 . Monitor mental status      Alcoh ol use disorder, severe, dependence  - with history of alcoholic cirrhosis and alcoholic hepatitis leading to acute liver failure  - urine toxicology ordered  - [facility]  - Psychiatry consulted on the case- deemed patient high risk, needs ETOH rehab  - at one point, Patient explicitly stated that she does not need Alcohol Rehab . She stated that her current problem is due to alcohol binge earlier in May due to problems in personal life and associated anxiety with that.  As per , patient has underwent multiple rehabs in the  past and relapsed.  The longest patient could ever go without relapsing was 1 year in early 2000s     Hyponatremia  - sodium of 118 on admission, patient has been getting diuretics at outside hospital.  Likely hypervolemic hyponatremia in setting of decompensated cirrhosis and volume overload  - hold diuretics at this time  - given hypotension hyponatremia and relatively normal potassium, AM cortisol level checked and noted to be at 7.8.   - cosyntropin stim test to evaluated for adrenal insufficiency - test was not optimally performed and showed unlikely adrenal insufficiency   - fluid restriction to 1 L per day     Hypotension  - likely due to liver failure and decompensated hepatic cirrhosis  - patient started on midodrine 5 mg t.i.d.--> increased to 15 t.i.d. given very tenuous blood pressures  -very low threshold for  transfer to ICU for IV pressors     Leukocytosis  - WBC count of 23 on admission.  Very likely due to alcoholic hepatitis.  WBC of 21 down trending from 20/6 at outside hospital  - no SBP and cx remains ngtd  - antibiotics as above  - continue monitor     Anasarca  - likely due to liver failure and decompensated hepatic cirrhosis  - 2D echo with 65 % and Estimated PA pressure is at least 36 mmHg.     Macrocytic Anemia  Thrombocytopenia  Coagulopathy  - hemoglobin of 10 with MCV of 100 on admission.  - platelets of 54 on admission.  - INR of 2.4 on admission likely due to liver failure  - administer IV vitamin K x3 days for coagulopathy-- INR remains 2.4 despite vitamin K  - monitor platelets and hemoglobin daily  - DIC and hemolysis studies negative  - 1 u prbcs given on 7/1 for decreasing hbg down to 7.2.   - patient may require an EGD     Tobacco use disorder  - patient is a current every day smoker  - will assess for necessity of nicotine patch     Diet:  Low Na, fluid restriction   GI PPx:  ppi  DVT PPx:  scds  Goals of Care:  full      High Risk Conditions:  Liver failure     Disposition:        Patient remains very high risk for quick decompensation and low threshold for transfer to ICU    Signing Physician:     Marisa Banks MD  Department of Hospital Medicine   [facility]  Pager [phone]  7/1/2019

## 2019-07-02 NOTE — PT/OT/SLP PROGRESS
Physical Therapy      Patient Name:  Destinee Gallagher   MRN:  [id number]    Patient not seen today secondary to Unavailable (Comment)(Pt with MDs in room for bedside procedure in AM and pt away for stress test in PM. ). Will follow-up per POC as appropriate.     Rolo Cohen, PTA

## 2019-07-02 NOTE — PLAN OF CARE
Problem: Adult Inpatient Plan of Care  Goal: Plan of Care Review  Outcome: Ongoing (interventions implemented as appropriate)  Plan of care reviewed with patient and .  remain at bedside overnight. Patient sad and agitated overnight. Safety maintained, call light in reach, bed in lowest position, will continue to monitor.

## 2019-07-02 NOTE — NURSING NOTE
"Patient verified by 2 identifiers and allergies reviewed.  Midline IV in place to Rt AC.  DSE explained to patient, consent obtained & testing completed.  Pt C/O SOB, jaw pain & "shaking" during peak testing, symptoms resolved during recovery period. IV flushed post testing.  Post study discharge instructions reviewed with patient, patient verbalized understanding.  Patient transferred back to room via stretcher accompanied by  & escort in stable condition.  "

## 2019-07-02 NOTE — PLAN OF CARE
Problem: Adult Inpatient Plan of Care  Goal: Plan of Care Review  Outcome: Ongoing (interventions implemented as appropriate)  Patient alert and oriented except for situation, forgets at time why she is in the hospital and feels that we are keeping her for no reason. Reminded of plan of care and liver transplant evaluation,  agrees with plan. Per  it is normal for her to forget about the reason for hospitalization. Mood very labile, from tirado and quiet to happy and talkative. Fall precautions in place. Poor appetite per . No signs of bleeding. Met goal bowel movement for the day.

## 2019-07-03 ENCOUNTER — COMMITTEE REVIEW (OUTPATIENT)
Dept: TRANSPLANT | Facility: CLINIC | Age: 39
End: 2019-07-03

## 2019-07-03 LAB
ALBUMIN SERPL BCP-MCNC: 3.9 G/DL (ref 3.5–5.2)
ALP SERPL-CCNC: 207 U/L (ref 55–135)
ALT SERPL W/O P-5'-P-CCNC: 7 U/L (ref 10–44)
ANION GAP SERPL CALC-SCNC: 12 MMOL/L (ref 8–16)
AST SERPL-CCNC: 54 U/L (ref 10–40)
BACTERIA #/AREA URNS AUTO: NORMAL /HPF
BASOPHILS # BLD AUTO: 0.08 K/UL (ref 0–0.2)
BASOPHILS NFR BLD: 0.4 % (ref 0–1.9)
BILIRUB SERPL-MCNC: 34.4 MG/DL (ref 0.1–1)
BILIRUB UR QL STRIP: ABNORMAL
BUN SERPL-MCNC: 41 MG/DL (ref 6–20)
CALCIUM SERPL-MCNC: 10.1 MG/DL (ref 8.7–10.5)
CHLORIDE SERPL-SCNC: 101 MMOL/L (ref 95–110)
CLARITY UR REFRACT.AUTO: ABNORMAL
CO2 SERPL-SCNC: 22 MMOL/L (ref 23–29)
COLOR UR AUTO: ABNORMAL
CREAT SERPL-MCNC: 1.3 MG/DL (ref 0.5–1.4)
DIFFERENTIAL METHOD: ABNORMAL
EOSINOPHIL # BLD AUTO: 0.3 K/UL (ref 0–0.5)
EOSINOPHIL NFR BLD: 1.9 % (ref 0–8)
ERYTHROCYTE [DISTWIDTH] IN BLOOD BY AUTOMATED COUNT: 18.3 % (ref 11.5–14.5)
EST. GFR  (AFRICAN AMERICAN): >60 ML/MIN/1.73 M^2
EST. GFR  (NON AFRICAN AMERICAN): 52.2 ML/MIN/1.73 M^2
GLUCOSE SERPL-MCNC: 129 MG/DL (ref 70–110)
GLUCOSE UR QL STRIP: ABNORMAL
HCT VFR BLD AUTO: 23.8 % (ref 37–48.5)
HGB BLD-MCNC: 8.5 G/DL (ref 12–16)
HGB UR QL STRIP: ABNORMAL
HYALINE CASTS UR QL AUTO: 0 /LPF
IMM GRANULOCYTES # BLD AUTO: 0.21 K/UL (ref 0–0.04)
IMM GRANULOCYTES NFR BLD AUTO: 1.2 % (ref 0–0.5)
INR PPP: 2.4 (ref 0.8–1.2)
KETONES UR QL STRIP: ABNORMAL
LACTATE SERPL-SCNC: 1.4 MMOL/L (ref 0.5–2.2)
LEUKOCYTE ESTERASE UR QL STRIP: NEGATIVE
LYMPHOCYTES # BLD AUTO: 0.9 K/UL (ref 1–4.8)
LYMPHOCYTES NFR BLD: 4.9 % (ref 18–48)
MAGNESIUM SERPL-MCNC: 2.4 MG/DL (ref 1.6–2.6)
MCH RBC QN AUTO: 34.6 PG (ref 27–31)
MCHC RBC AUTO-ENTMCNC: 35.7 G/DL (ref 32–36)
MCV RBC AUTO: 97 FL (ref 82–98)
MICROSCOPIC COMMENT: NORMAL
MONOCYTES # BLD AUTO: 0.8 K/UL (ref 0.3–1)
MONOCYTES NFR BLD: 4.6 % (ref 4–15)
NEUTROPHILS # BLD AUTO: 15.6 K/UL (ref 1.8–7.7)
NEUTROPHILS NFR BLD: 87 % (ref 38–73)
NITRITE UR QL STRIP: POSITIVE
NRBC BLD-RTO: 0 /100 WBC
PH UR STRIP: 6 [PH] (ref 5–8)
PHOSPHATE SERPL-MCNC: 2.9 MG/DL (ref 2.7–4.5)
PLATELET # BLD AUTO: 70 K/UL (ref 150–350)
PMV BLD AUTO: 12.5 FL (ref 9.2–12.9)
POCT GLUCOSE: 108 MG/DL (ref 70–110)
POCT GLUCOSE: 129 MG/DL (ref 70–110)
POCT GLUCOSE: 140 MG/DL (ref 70–110)
POTASSIUM SERPL-SCNC: 3.8 MMOL/L (ref 3.5–5.1)
PROT SERPL-MCNC: 6.3 G/DL (ref 6–8.4)
PROT UR QL STRIP: ABNORMAL
PROTHROMBIN TIME: 23.2 SEC (ref 9–12.5)
RBC # BLD AUTO: 2.46 M/UL (ref 4–5.4)
RBC #/AREA URNS AUTO: 1 /HPF (ref 0–4)
SODIUM SERPL-SCNC: 135 MMOL/L (ref 136–145)
SP GR UR STRIP: 1.02 (ref 1–1.03)
SQUAMOUS #/AREA URNS AUTO: 14 /HPF
URN SPEC COLLECT METH UR: ABNORMAL
WBC # BLD AUTO: 17.93 K/UL (ref 3.9–12.7)
WBC #/AREA URNS AUTO: 5 /HPF (ref 0–5)

## 2019-07-03 PROCEDURE — 63600175 PHARM REV CODE 636 W HCPCS: Performed by: HOSPITALIST

## 2019-07-03 PROCEDURE — 90471 IMMUNIZATION ADMIN: CPT | Performed by: NURSE PRACTITIONER

## 2019-07-03 PROCEDURE — 25000003 PHARM REV CODE 250: Performed by: INTERNAL MEDICINE

## 2019-07-03 PROCEDURE — 80053 COMPREHEN METABOLIC PANEL: CPT

## 2019-07-03 PROCEDURE — 83735 ASSAY OF MAGNESIUM: CPT

## 2019-07-03 PROCEDURE — 25000003 PHARM REV CODE 250: Performed by: PHYSICIAN ASSISTANT

## 2019-07-03 PROCEDURE — 84100 ASSAY OF PHOSPHORUS: CPT

## 2019-07-03 PROCEDURE — 99233 SBSQ HOSP IP/OBS HIGH 50: CPT | Mod: ,,, | Performed by: INTERNAL MEDICINE

## 2019-07-03 PROCEDURE — 25000003 PHARM REV CODE 250: Performed by: HOSPITALIST

## 2019-07-03 PROCEDURE — 87040 BLOOD CULTURE FOR BACTERIA: CPT

## 2019-07-03 PROCEDURE — 99233 PR SUBSEQUENT HOSPITAL CARE,LEVL III: ICD-10-PCS | Mod: ,,, | Performed by: INTERNAL MEDICINE

## 2019-07-03 PROCEDURE — 99252 PR INITIAL INPATIENT CONSULT,LEVL II: ICD-10-PCS | Mod: ,,, | Performed by: OBSTETRICS & GYNECOLOGY

## 2019-07-03 PROCEDURE — 99233 PR SUBSEQUENT HOSPITAL CARE,LEVL III: ICD-10-PCS | Mod: ,,, | Performed by: HOSPITALIST

## 2019-07-03 PROCEDURE — 99252 IP/OBS CONSLTJ NEW/EST SF 35: CPT | Mod: ,,, | Performed by: OBSTETRICS & GYNECOLOGY

## 2019-07-03 PROCEDURE — 20600001 HC STEP DOWN PRIVATE ROOM

## 2019-07-03 PROCEDURE — 63600175 PHARM REV CODE 636 W HCPCS: Performed by: NURSE PRACTITIONER

## 2019-07-03 PROCEDURE — 94761 N-INVAS EAR/PLS OXIMETRY MLT: CPT

## 2019-07-03 PROCEDURE — 99233 SBSQ HOSP IP/OBS HIGH 50: CPT | Mod: ,,, | Performed by: HOSPITALIST

## 2019-07-03 PROCEDURE — 97110 THERAPEUTIC EXERCISES: CPT

## 2019-07-03 PROCEDURE — 81001 URINALYSIS AUTO W/SCOPE: CPT

## 2019-07-03 PROCEDURE — 86480 TB TEST CELL IMMUN MEASURE: CPT

## 2019-07-03 PROCEDURE — 83605 ASSAY OF LACTIC ACID: CPT

## 2019-07-03 PROCEDURE — 85025 COMPLETE CBC W/AUTO DIFF WBC: CPT

## 2019-07-03 PROCEDURE — 97116 GAIT TRAINING THERAPY: CPT

## 2019-07-03 PROCEDURE — 90670 PCV13 VACCINE IM: CPT | Performed by: NURSE PRACTITIONER

## 2019-07-03 PROCEDURE — 85610 PROTHROMBIN TIME: CPT

## 2019-07-03 PROCEDURE — 36415 COLL VENOUS BLD VENIPUNCTURE: CPT

## 2019-07-03 PROCEDURE — 25000003 PHARM REV CODE 250: Performed by: NURSE PRACTITIONER

## 2019-07-03 RX ORDER — HYDROXYZINE PAMOATE 25 MG/1
25 CAPSULE ORAL EVERY 8 HOURS PRN
Status: DISCONTINUED | OUTPATIENT
Start: 2019-07-03 | End: 2019-07-04

## 2019-07-03 RX ADMIN — PANTOPRAZOLE SODIUM 40 MG: 40 TABLET, DELAYED RELEASE ORAL at 03:07

## 2019-07-03 RX ADMIN — Medication 100 MG: at 09:07

## 2019-07-03 RX ADMIN — OCTREOTIDE ACETATE 100 MCG: 100 INJECTION, SOLUTION INTRAVENOUS; SUBCUTANEOUS at 05:07

## 2019-07-03 RX ADMIN — MIDODRINE HYDROCHLORIDE 15 MG: 5 TABLET ORAL at 08:07

## 2019-07-03 RX ADMIN — MIDODRINE HYDROCHLORIDE 15 MG: 5 TABLET ORAL at 03:07

## 2019-07-03 RX ADMIN — RIFAXIMIN 550 MG: 550 TABLET ORAL at 08:07

## 2019-07-03 RX ADMIN — LIDOCAINE 2 PATCH: 50 PATCH TOPICAL at 08:07

## 2019-07-03 RX ADMIN — MIDODRINE HYDROCHLORIDE 15 MG: 5 TABLET ORAL at 09:07

## 2019-07-03 RX ADMIN — HYDROXYZINE PAMOATE 25 MG: 25 CAPSULE ORAL at 08:07

## 2019-07-03 RX ADMIN — LACTULOSE 20 G: 20 SOLUTION ORAL at 08:07

## 2019-07-03 RX ADMIN — RIFAXIMIN 550 MG: 550 TABLET ORAL at 09:07

## 2019-07-03 RX ADMIN — CEFTRIAXONE 2 G: 2 INJECTION, SOLUTION INTRAVENOUS at 12:07

## 2019-07-03 RX ADMIN — SODIUM BICARBONATE 650 MG TABLET 1300 MG: at 08:07

## 2019-07-03 RX ADMIN — CIPROFLOXACIN HYDROCHLORIDE 500 MG: 500 TABLET, FILM COATED ORAL at 09:07

## 2019-07-03 RX ADMIN — SODIUM BICARBONATE 650 MG TABLET 1300 MG: at 09:07

## 2019-07-03 RX ADMIN — PANTOPRAZOLE SODIUM 40 MG: 40 TABLET, DELAYED RELEASE ORAL at 05:07

## 2019-07-03 RX ADMIN — LACTULOSE 20 G: 20 SOLUTION ORAL at 09:07

## 2019-07-03 RX ADMIN — HYDROXYZINE PAMOATE 25 MG: 25 CAPSULE ORAL at 09:07

## 2019-07-03 RX ADMIN — IVERMECTIN 12000 MCG: 3 TABLET ORAL at 11:07

## 2019-07-03 RX ADMIN — FOLIC ACID 1 MG: 1 TABLET ORAL at 09:07

## 2019-07-03 RX ADMIN — PNEUMOCOCCAL 13-VALENT CONJUGATE VACCINE 0.5 ML: 2.2; 2.2; 2.2; 2.2; 2.2; 4.4; 2.2; 2.2; 2.2; 2.2; 2.2; 2.2; 2.2 INJECTION, SUSPENSION INTRAMUSCULAR at 09:07

## 2019-07-03 RX ADMIN — RAMELTEON 8 MG: 8 TABLET, FILM COATED ORAL at 08:07

## 2019-07-03 NOTE — ASSESSMENT & PLAN NOTE
Patient is a 38-year-old female with alcoholic cirrhosis with decompensation.  Meld score is improving to 32 today.  She was present to that committee review meeting, and the patient was referred for liver transplant pending evaluation for optimization of bipolar treatment.  She has a high psychosocial risk.    Recommend re-evaluation by Psychiatry  Continue lactulose and rifaximin for hepatic encephalopathy.  Continue midodrine, albumin, and octreotide for hepatic renal syndrome treatment  -repeat septic workup.  Continue supportive care, thiamine, folate, multivitamin supplementation  -daily CBC, CMP, INR

## 2019-07-03 NOTE — PROGRESS NOTES
"  [facility]  Adult Nutrition  Consult Note    SUMMARY     Recommendations    1. Continue current Low sodium diet + Boost Plus ONS & Beneprotein.   2. RD to monitor & follow-up.    Goals: PO intake >50%  Nutrition Goal Status: progressing towards goal  Communication of RD Recs: reviewed with RN    Reason for Assessment    Reason For Assessment: RD follow-up  Diagnosis: other (see comments)(Liver fx; Liver tx work-up)  Relevant Medical History: Cirrhosis, Etoh abuse  Interdisciplinary Rounds: did not attend    General Information Comments: Pt ROSALIND at time of visit. Per RN documentation, pt tolerating diet, consumed 50% of breakfast this AM. Boost Plus ONS & Beneprotein ordered to aid in caloric intake. Planning for paracentesis today. At initial RD visit, pt reports poor appetite PTA x 1 month & UBW: 118#. Difficult to assess for malnutrition at this time, pt declined NFPE. Low sodium diet education reinforced .   Nutrition Discharge Planning: Adequate PO intake    Nutrition/Diet History    Patient Reported Diet/Restrictions/Preferences: general, low salt  Spiritual, Cultural Beliefs, Judaism Practices, Values that Affect Care: no  Factors Affecting Nutritional Intake: decreased appetite    Anthropometrics    Temp: 97.5 °F (36.4 °C)  Height Method: Stated  Height: 5' 4" (162.6 cm)  Height (inches): 64 in  Weight Method: Bed Scale  Weight: 60.9 kg (134 lb 4.2 oz)  Weight (lb): 134.26 lb  Ideal Body Weight (IBW), Female: 120 lb  % Ideal Body Weight, Female (lb): 111.88 lb  BMI (Calculated): 23.1  BMI Grade: 18.5-24.9 - normal  Usual Body Weight (UBW), k.6 kg  % Usual Body Weight: 113.11  % Weight Change From Usual Weight: 12.87 %    Lab/Procedures/Meds    Pertinent Labs Reviewed: reviewed  Pertinent Labs Comments: Na 135, BUN 41, Bili 34.4  Pertinent Medications Reviewed: reviewed  Pertinent Medications Comments: Folic acid, Thiamine    Estimated/Assessed Needs    Weight Used For Calorie " Calculations: 60.9 kg (134 lb 4.2 oz)     Energy Calorie Requirements (kcal): 1588 kcal/d  Energy Need Method: [place] Je[place](1.25 PAL)     Protein Requirements: 73 g/d (1.2 g/kg)  Weight Used For Protein Calculations: 60.5 kg (133 lb 6.1 oz)     Estimated Fluid Requirement Method: other (see comments)(Per MD or 1 mL/kcal)    Nutrition Prescription Ordered    Current Diet Order: Low sodium  Oral Nutrition Supplement: Boost Plus & Beneprotein    Evaluation of Received Nutrient/Fluid Intake    Comments: LBM: 7/2    Tolerance: tolerating    Nutrition Risk    Level of Risk/Frequency of Follow-up: (1x/week)     Assessment and Plan    Nutrition Problem  Increased nutrient needs     Related to (etiology):   Physiological causes      Signs and Symptoms (as evidenced by):   Liver tx work-up     Interventions/Recommendations (treatment strategy):  Collaboration of nutrition care w/ other providers      Nutrition Diagnosis Status:   Continues     Monitor and Evaluation    Food and Nutrient Intake: energy intake, food and beverage intake  Food and Nutrient Adminstration: diet order  Physical Activity and Function: nutrition-related ADLs and IADLs  Anthropometric Measurements: weight, weight change  Biochemical Data, Medical Tests and Procedures: electrolyte and renal panel, gastrointestinal profile, glucose/endocrine profile, inflammatory profile, lipid profile  Nutrition-Focused Physical Findings: overall appearance     Malnutrition Assessment    Energy Intake (Malnutrition): less than 75% for greater than or equal to 1 month     Nutrition Follow-Up    RD Follow-up?: Yes

## 2019-07-03 NOTE — H&P
Radiology History & Physical      SUBJECTIVE:     Chief Complaint: abdominal distention    History of Present Illness:  Destinee Gallagher is a 38 y.o. female who presents for evaluation of ascites  Past Medical History:   Diagnosis Date    Acute alcoholic hepatitis 2019    Acute liver failure without hepatic coma 2019    Alcohol use disorder, severe, dependence 2019    Coagulopathy 2019    Hepatic encephalopathy 2019    Hyponatremia 2019    Thrombocytopenia due to hypersplenism 2019     Past Surgical History:   Procedure Laterality Date     SECTION      CHOLECYSTECTOMY         Home Meds:   Prior to Admission medications    Medication Sig Start Date End Date Taking? Authorizing Provider   magnesium citrate solution Take 296 mLs by mouth once.   Yes Historical Provider, MD   multivitamin (THERAGRAN) per tablet Take 1 tablet by mouth once daily.   Yes Historical Provider, MD   omeprazole (PRILOSEC) 20 MG capsule Take 20 mg by mouth once daily.   Yes Historical Provider, MD   spironolactone (ALDACTONE) 100 MG tablet Take 100 mg by mouth once daily.   Yes Historical Provider, MD     Anticoagulants/Antiplatelets: no anticoagulation    Allergies:   Review of patient's allergies indicates:   Allergen Reactions    Zofran [ondansetron hcl (pf)]      headache     Sedation History:  no adverse reactions    Review of Systems:   Hematological: no known coagulopathies  Respiratory: no shortness of breath  Cardiovascular: no chest pain  Gastrointestinal: no abdominal pain  Genito-Urinary: no dysuria  Musculoskeletal: negative  Neurological: no TIA or stroke symptoms         OBJECTIVE:     Vital Signs (Most Recent)  Temp: 98.4 °F (36.9 °C) (19 1209)  Pulse: 83 (19 1410)  Resp: 16 (19 1410)  BP: 102/60 (19 1410)  SpO2: (!) 94 % (19 1410)    Physical Exam:  ASA: 3  Mallampati: na    General: no acute distress  Mental Status: alert and oriented to person,  place and time  HEENT: normocephalic, atraumatic  Chest: unlabored breathing  Heart: regular heart rate  Abdomen: distended  Extremity: moves all extremities    ASSESSMENT/PLAN:     Sedation Plan: local anesthesia  Patient will undergo US guided paracentesis

## 2019-07-03 NOTE — SUBJECTIVE & OBJECTIVE
Interval History:  Patient appears well today, joking and pleasant.  States that she does not want to drink alcohol again.  No acute complaints.  Meld score is 32 today.    Current Facility-Administered Medications   Medication    acetaminophen tablet 325 mg    dextrose 10% (D10W) Bolus    dextrose 10% (D10W) Bolus    folic acid tablet 1 mg    glucagon (human recombinant) injection 1 mg    glucose chewable tablet 16 g    glucose chewable tablet 24 g    hepatitis B (HEPLISAV-B) 20 mcg/0.5 mL vaccine 0.5 mL    hydrOXYzine pamoate capsule 25 mg    ivermectin tablet 12,000 mcg    lactulose 20 gram/30 mL solution Soln 20 g    lidocaine 5 % patch 2 patch    midodrine tablet 15 mg    nicotine 21 mg/24 hr 1 patch    pantoprazole EC tablet 40 mg    polyethylene glycol packet 17 g    promethazine tablet 12.5 mg    ramelteon tablet 8 mg    rifAXIMin tablet 550 mg    sodium bicarbonate tablet 1,300 mg    sodium chloride 0.9% flush 5 mL    Tdap vaccine injection 0.5 mL    thiamine tablet 100 mg    traMADol tablet 50 mg       Objective:     Vital Signs (Most Recent):  Temp: 98.3 °F (36.8 °C) (07/03/19 1645)  Pulse: 84 (07/03/19 1645)  Resp: 18 (07/03/19 1645)  BP: 100/67 (07/03/19 1645)  SpO2: (!) 92 % (07/03/19 1645) Vital Signs (24h Range):  Temp:  [97.5 °F (36.4 °C)-98.4 °F (36.9 °C)] 98.3 °F (36.8 °C)  Pulse:  [78-97] 84  Resp:  [16-18] 18  SpO2:  [92 %-97 %] 92 %  BP: (100-113)/(57-69) 100/67     Weight: 60.9 kg (134 lb 4.2 oz) (07/03/19 1000)  Body mass index is 23.05 kg/m².    Physical Exam   Constitutional: She is oriented to person, place, and time. She appears well-developed. No distress.   HENT:   Head: Normocephalic.   Eyes: Conjunctivae are normal. Scleral icterus is present.   Neck: Normal range of motion. Neck supple.   Cardiovascular: Normal rate and regular rhythm.   Pulmonary/Chest: Effort normal and breath sounds normal.   Abdominal: Soft. Bowel sounds are normal. She exhibits no  distension and no mass. There is no tenderness. There is no rebound and no guarding.   Mild asterixis   Musculoskeletal: Normal range of motion.   Neurological: She is alert and oriented to person, place, and time.   Skin: Skin is warm and dry.   Psychiatric: She has a normal mood and affect.        Patient not seen as she was in shower.    MELD-Na score: 33 at 7/3/2019  3:43 AM  MELD score: 32 at 7/3/2019  3:43 AM  Calculated from:  Serum Creatinine: 1.3 mg/dL at 7/3/2019  3:42 AM  Serum Sodium: 135 mmol/L at 7/3/2019  3:42 AM  Total Bilirubin: 34.4 mg/dL at 7/3/2019  3:42 AM  INR(ratio): 2.4 at 7/3/2019  3:43 AM  Age: 38 years    Significant Labs:  CBC:   Recent Labs   Lab 07/03/19  0343   WBC 17.93*   RBC 2.46*   HGB 8.5*   HCT 23.8*   PLT 70*     CMP:   Recent Labs   Lab 07/03/19  0342   *   CALCIUM 10.1   ALBUMIN 3.9   PROT 6.3   *   K 3.8   CO2 22*      BUN 41*   CREATININE 1.3   ALKPHOS 207*   ALT 7*   AST 54*   BILITOT 34.4*     Coagulation:   Recent Labs   Lab 07/03/19  0343   INR 2.4*

## 2019-07-03 NOTE — PLAN OF CARE
Problem: Adult Inpatient Plan of Care  Goal: Plan of Care Review  Outcome: Ongoing (interventions implemented as appropriate)  Pt AAx3-4, breathing even and unlabored, call light in reach, bed in lowest position, spouse at bedside throughout shift. Consulted team regarding pt anxiety. Pt was tearful when informed about paracentesis, team at bedside to answer questions regarding fear of paracentesis. Pt and spouse stated understanding. Liver approval deferred until psych consult completed. Pt and  off floor occassionally during shift for walks around hospital. VSS, frequent hourly rounding completed. Strict I and O maintained.Will continue to monitor.

## 2019-07-03 NOTE — PLAN OF CARE
Pt arrived to [facility] bay 4 for para. Resting in bed, no acute distress noted. Orders reviewed on chart. Awaiting consen

## 2019-07-03 NOTE — PLAN OF CARE
Problem: Adult Inpatient Plan of Care  Goal: Plan of Care Review  Outcome: Ongoing (interventions implemented as appropriate)  Plan of care reviewed with patient and .  remain at bedside overnight. Patient sad and agitated overnight. Safety maintained, call light in reach, bed in lowest position. Will continue to monitor.

## 2019-07-03 NOTE — PLAN OF CARE
Pt. to be discussed @ liver transplant meeting today.  Paracentesis planned. SW/CM will continue to follow.

## 2019-07-03 NOTE — PROGRESS NOTES
"[facility]  Hepatology  Progress Note    Patient Name: Destinee Gallagher  MRN: [id number]  Admission Date: 6/26/2019  Hospital Length of Stay: 7 days  Attending Provider: Darrick Knapp MD   Primary Care Physician: Harsha Larson  Principal Problem:Acute liver failure without hepatic coma    Subjective:     Transplant status: No    HPI: 38 year old female with a history of alcoholic cirrhosis on who hepatology is being consulted for decompensated cirrhosis.    Per HPI:  "[place] [facility] [place] a transfer from  [facility] in [place], [place], for liver transplantation evaluation as well as hepatology evaluation. Patient was admitted to OSH 6/6 with abdominal pain, nausea, decreased oral intake, and visibly jaundice.  Over past 2.5 weeks there , pt has been diagnosed with and treated for SBP.  She has been continued on diuretics, but ascites has been resistant to diuretics , requiring multiple paracenteses (last LVP several days prior to transfer).  Most recent paracentesis did not meet criteria for SBP, and had no PMNs, but pt remained on Rocephin for leukocytosis of unclear source.  She has been encephalopathic but slowly improving with increased doses of Lactulose and Rifaxamin.  Pt has not improved with current treatment, thus requesting liver transplant evaluation, Case d/w Dr. Posadas, [place] [facility]. MELD 35 upon presentation at [facility]. Last ETOH drink on 6/3     Upon presentation at [facility], patient was afebrile with systolic blood pressures in the 90s satting 95% on room air.  She was mildly encephalopathic, however was able to participate in conversation,  at bedside.  Meld 35 on presentation.  Labs notable for total bilirubin of 28, sodium of 118 (patient has been getting in diuretics at the outside hospital), INR of 2.4.  WBC of 23, platelets 54.      Upon further questioning regarding patient's disease process, she stated that she has had history of " "heavy alcohol use, most recently 1-2 pt of vodka per day.  Patient's last alcoholic drink was on 06/03/2019, approximately.  Patient did not state that she has been binge drinking, as noted above 2 pt of vodka per day, prior to her admission at outside hospital.  Patient also uses tobacco.  She stated that she was 1st diagnosed with alcoholic cirrhosis and alcoholic hepatitis in August 2018 and was advised to stop drinking at that time.  Patient has had multiple relapses since then.  It appears the patient has failed multiple alcohol rehabs in the past.  In the last couple of months patient has had recurrent problem with worsening ascites and abdominal distention."    Interval History:  Patient seen with  at bedside. Reports he last drink was on 6/1.  She has been to rehab multiple times but has always relapse.  Feels like this time will be different as she will follow instructions and has 2 kids to live for.    Interval History:  Patient appears well today, joking and pleasant.  States that she does not want to drink alcohol again.  No acute complaints.  Meld score is 32 today.    Current Facility-Administered Medications   Medication    acetaminophen tablet 325 mg    dextrose 10% (D10W) Bolus    dextrose 10% (D10W) Bolus    folic acid tablet 1 mg    glucagon (human recombinant) injection 1 mg    glucose chewable tablet 16 g    glucose chewable tablet 24 g    hepatitis B (HEPLISAV-B) 20 mcg/0.5 mL vaccine 0.5 mL    hydrOXYzine pamoate capsule 25 mg    ivermectin tablet 12,000 mcg    lactulose 20 gram/30 mL solution Soln 20 g    lidocaine 5 % patch 2 patch    midodrine tablet 15 mg    nicotine 21 mg/24 hr 1 patch    pantoprazole EC tablet 40 mg    polyethylene glycol packet 17 g    promethazine tablet 12.5 mg    ramelteon tablet 8 mg    rifAXIMin tablet 550 mg    sodium bicarbonate tablet 1,300 mg    sodium chloride 0.9% flush 5 mL    Tdap vaccine injection 0.5 mL    thiamine tablet 100 " mg    traMADol tablet 50 mg       Objective:     Vital Signs (Most Recent):  Temp: 98.3 °F (36.8 °C) (07/03/19 1645)  Pulse: 84 (07/03/19 1645)  Resp: 18 (07/03/19 1645)  BP: 100/67 (07/03/19 1645)  SpO2: (!) 92 % (07/03/19 1645) Vital Signs (24h Range):  Temp:  [97.5 °F (36.4 °C)-98.4 °F (36.9 °C)] 98.3 °F (36.8 °C)  Pulse:  [78-97] 84  Resp:  [16-18] 18  SpO2:  [92 %-97 %] 92 %  BP: (100-113)/(57-69) 100/67     Weight: 60.9 kg (134 lb 4.2 oz) (07/03/19 1000)  Body mass index is 23.05 kg/m².    Physical Exam   Constitutional: She is oriented to person, place, and time. She appears well-developed. No distress.   HENT:   Head: Normocephalic.   Eyes: Conjunctivae are normal. Scleral icterus is present.   Neck: Normal range of motion. Neck supple.   Cardiovascular: Normal rate and regular rhythm.   Pulmonary/Chest: Effort normal and breath sounds normal.   Abdominal: Soft. Bowel sounds are normal. She exhibits no distension and no mass. There is no tenderness. There is no rebound and no guarding.   Mild asterixis   Musculoskeletal: Normal range of motion.   Neurological: She is alert and oriented to person, place, and time.   Skin: Skin is warm and dry.   Psychiatric: She has a normal mood and affect.        Patient not seen as she was in shower.    MELD-Na score: 33 at 7/3/2019  3:43 AM  MELD score: 32 at 7/3/2019  3:43 AM  Calculated from:  Serum Creatinine: 1.3 mg/dL at 7/3/2019  3:42 AM  Serum Sodium: 135 mmol/L at 7/3/2019  3:42 AM  Total Bilirubin: 34.4 mg/dL at 7/3/2019  3:42 AM  INR(ratio): 2.4 at 7/3/2019  3:43 AM  Age: 38 years    Significant Labs:  CBC:   Recent Labs   Lab 07/03/19  0343   WBC 17.93*   RBC 2.46*   HGB 8.5*   HCT 23.8*   PLT 70*     CMP:   Recent Labs   Lab 07/03/19  0342   *   CALCIUM 10.1   ALBUMIN 3.9   PROT 6.3   *   K 3.8   CO2 22*      BUN 41*   CREATININE 1.3   ALKPHOS 207*   ALT 7*   AST 54*   BILITOT 34.4*     Coagulation:   Recent Labs   Lab 07/03/19  0343   INR  2.4*         Assessment/Plan:     Alcoholic cirrhosis of liver with ascites  Patient is a 38-year-old female with alcoholic cirrhosis with decompensation.  Meld score is improving to 32 today.  She was present to that committee review meeting, and the patient was referred for liver transplant pending evaluation for optimization of bipolar treatment.  She has a high psychosocial risk.    Recommend re-evaluation by Psychiatry  Continue lactulose and rifaximin for hepatic encephalopathy.  Continue midodrine, albumin, and octreotide for hepatic renal syndrome treatment  -repeat septic workup.  Continue supportive care, thiamine, folate, multivitamin supplementation  -daily CBC, CMP, INR        Thank you for your consult. I will follow-up with patient. Please contact us if you have any additional questions.    Maegan Cardenas MD  Hepatology  [facility]

## 2019-07-03 NOTE — PLAN OF CARE
Problem: Physical Therapy Goal  Goal: Physical Therapy Goal  Goals to be met by: 2019    Patient will increase functional independence with mobility by performin. Supine to sit with Modified [place]  2. Bed to chair transfer with Modified [place] using LRAD  3. Gait  x 300 feet with Supervision using no AD or LRAD.   4. Ascend/descend 13 stairs with bilateral Handrails Stand-by Assistance.   5. Lower extremity exercise program x20 reps per handout, with independence     Outcome: Ongoing (interventions implemented as appropriate)  Pt. Progressing towards goals

## 2019-07-03 NOTE — NURSING TRANSFER
Nursing Transfer Note      7/3/2019     Transfer : IR    Transfer via: stretcher    Transfer with: spouse and IV Pole    Transported by: transporter    Medicines sent:IV rocephin    Chart send with patient: Yes

## 2019-07-03 NOTE — COMMITTEE REVIEW
Destinee Gallagher's case presented to selection committee.  Patient has been deferred for liver transplant pending general psych consult to assess/ manage bipolar disorder.  I was present at the committee meeting and attest to the decision of the committee.    Edward Mustafa  07/05/2019

## 2019-07-03 NOTE — PT/OT/SLP PROGRESS
Physical Therapy Treatment    Patient Name:  Destinee Gallagher   MRN:  [id number]    Recommendations:     Discharge Recommendations:  home health PT   Discharge Equipment Recommendations: (TBD)   Barriers to discharge: None    Assessment:     Destinee Gallagher is a 38 y.o. female admitted with a medical diagnosis of Acute liver failure without hepatic coma.  She presents with the following impairments/functional limitations:  weakness, impaired endurance, impaired functional mobilty, gait instability, impaired balance, impaired cognition, decreased safety awareness Pt. cooperative and tolerated treatment well. Pt. progressing with mobility.    Rehab Prognosis: Good; patient would benefit from acute skilled PT services to address these deficits and reach maximum level of function.    Recent Surgery: Procedure(s) (LRB):  EGD (ESOPHAGOGASTRODUODENOSCOPY) (N/A)      Plan:     During this hospitalization, patient to be seen 3 x/week to address the identified rehab impairments via gait training, therapeutic activities, therapeutic exercises, neuromuscular re-education and progress toward the following goals:    · Plan of Care Expires:  07/28/19    Subjective     Chief Complaint: anxiety  Patient/Family Comments/goals: to be more mobile  Pain/Comfort:  · Pain Rating 1: 0/10  · Pain Rating Post-Intervention 1: 0/10      Objective:     Communicated with nursing prior to session.  Patient found supine with peripheral IV, telemetry upon PT entry to room.     General Precautions: Standard, fall   Orthopedic Precautions:N/A   Braces:       Functional Mobility:  · Bed Mobility:     · Rolling Left:  stand by assistance  · Scooting: stand by assistance  · Supine to Sit: stand by assistance  · Sit to Supine: stand by assistance  · Transfers:     · Sit to Stand:  contact guard assistance with hand-held assist  · Gait: 150' with HHA and CGA-Min A for balance/safety. Pt. amb. with slightly unsteady gait at times.  · Balance: fair-      AM-PAC 6  CLICK MOBILITY  Turning over in bed (including adjusting bedclothes, sheets and blankets)?: 3  Sitting down on and standing up from a chair with arms (e.g., wheelchair, bedside commode, etc.): 3  Moving from lying on back to sitting on the side of the bed?: 3  Moving to and from a bed to a chair (including a wheelchair)?: 3  Need to walk in hospital room?: 3  Climbing 3-5 steps with a railing?: 3  Basic Mobility Total Score: 18       Therapeutic Activities and Exercises:   Pt. Performed (B) LE therex x15 reps supine in bed. Discussed pt.'s progress, goals, and POC.    Patient left supine with all lines intact and call button in reach..    GOALS:   Multidisciplinary Problems     Physical Therapy Goals        Problem: Physical Therapy Goal    Goal Priority Disciplines Outcome Goal Variances Interventions   Physical Therapy Goal     PT, PT/OT Ongoing (interventions implemented as appropriate)     Description:  Goals to be met by: 2019    Patient will increase functional independence with mobility by performin. Supine to sit with Modified [place]  2. Bed to chair transfer with Modified [place] using LRAD  3. Gait  x 300 feet with Supervision using no AD or LRAD.   4. Ascend/descend 13 stairs with bilateral Handrails Stand-by Assistance.   5. Lower extremity exercise program x20 reps per handout, with independence                      Time Tracking:     PT Received On: 19  PT Start Time: 1302     PT Stop Time: 1325  PT Total Time (min): 23 min     Billable Minutes: Gait Training 13 and Therapeutic Exercise 10    Treatment Type: Treatment  PT/PTA: PT     PTA Visit Number: 0     Evert Brownlee, PT  2019

## 2019-07-03 NOTE — PROGRESS NOTES
Progress Note  Hospital Medicine  [facility], [place]       Patient Name: Destinee Gallagher  MRN:  [id number]  Hospital Medicine Team: [facility] MED L Darrick Knapp MD  Date of Admission:  6/26/2019     Length of Stay:  LOS: 6 days   Expected Discharge Date: 7/5/2019  Principal Problem:  Acute liver failure without hepatic coma     Subjective:     Interval History/Overnight Events:    - patient went for echo stress today and was negative for ischemia;   - Cr is improving down to 1.6; patient to be discussed at liver transplant meeting tomorrow;   - will attempt IR paracentesis tomorrow      [START ON 7/3/2019] ciprofloxacin HCl  500 mg Oral Daily    folic acid  1 mg Oral Daily    [START ON 7/3/2019] ivermectin  200 mcg/kg Oral Daily    lactulose  20 g Oral TID    lidocaine  2 patch Transdermal Q24H    midodrine  15 mg Oral TID    nicotine  1 patch Transdermal Daily    octreotide  100 mcg Intravenous Q8H    pantoprazole  40 mg Oral BID AC    rifAXImin  550 mg Oral BID    sodium bicarbonate  1,300 mg Oral BID    thiamine  100 mg Oral Daily           acetaminophen, Dextrose 10% Bolus, Dextrose 10% Bolus, glucagon (human recombinant), glucose, glucose, polyethylene glycol, promethazine, ramelteon, sodium chloride 0.9%, traMADol    Review of Systems   Constitutional: Negative for chills, fatigue, fever.   HENT: Negative for sore throat, trouble swallowing.    Eyes: Negative for photophobia, visual disturbance.   Respiratory: Negative for cough, shortness of breath.    Cardiovascular: Negative for chest pain, palpitations, leg swelling.   Gastrointestinal: Negative for abdominal pain, constipation, diarrhea, nausea, vomiting.   Endocrine: Negative for cold intolerance, heat intolerance.   Genitourinary: Negative for dysuria, frequency.   Musculoskeletal: Negative for arthralgias, myalgias.   Skin: Negative for rash, wound, erythema   Neurological: Negative for dizziness, syncope, weakness,  light-headedness.   Psychiatric/Behavioral: Negative for confusion, hallucinations, anxiety  All other systems reviewed and are negative.    Objective:     Temp:  [97.3 °F (36.3 °C)-98.3 °F (36.8 °C)]   Pulse:  [76-91]   Resp:  [17-18]   BP: (102-122)/(30-77)   SpO2:  [91 %-95 %]       Physical Exam:  Constitutional: appears older than stated age, chronically ill looking,  non-distressed, not diaphoretic.   HENT: NC/AT, external ears normal, oropharynx clear, MMM w/o exudates.   Eyes: PERRL, EOMI, conjunctiva normal, no discharge b/l, +scleral icterus   Neck: normal ROM, supple  CV: RRR, no m/r/g, no carotid bruits, +2 peripheral pulses.  Pulmonary/Chest wall: Breathing comfortably w/o distress, CTAB, no w/r/r, no crackles.  Decreased breath sounds at lung bases  GI: Soft, non-tender, (+) BS, (+) BM   +distended, tender palpation    Musculoskeletal: Normal ROM, no atrophy,  +no pitting edema in LE bilaterally   Neurological: AAO x 4, CN II-XI in tact, nl sensation, nl strength/tone    No asterixis   Skin: warm, dry   + jaundice, + significant spider angiomas, +bruising   Psych: normal mood and affect, normal behavior, thought content and judgement.    Labs:    Chemistries:   Recent Labs   Lab 06/30/19  0621 07/01/19  0526 07/02/19  0445   * 132* 135*   K 3.7 3.9 3.5   CL 97 98 100   CO2 19* 20* 20*   BUN 68* 59* 52*   CREATININE 2.8* 2.1* 1.6*   CALCIUM 10.2 10.1 10.1   PROT 6.1 6.3 6.4   BILITOT 28.6* 29.1* 31.4*   ALKPHOS 247* 225* 214*   ALT 11 10 8*   AST 72* 81* 66*   MG 2.7* 2.6 2.6   PHOS 5.1* 4.4 3.6        WBC:   Recent Labs   Lab 06/29/19  0353 06/29/19  1532 06/30/19 0621 07/01/19  0526 07/02/19  0446   WBC 20.61* 20.65* 17.64* 14.56* 16.47*     Bands:     CBC/Anemia Labs: Coags:    Recent Labs   Lab 06/30/19 0621 07/01/19  0526 07/02/19  0446   WBC 17.64* 14.56* 16.47*   HGB 7.8* 7.2* 8.5*   HCT 21.6* 19.8* 24.6*   PLT 77* 57* 58*   MCV 96 97 99*   RDW 16.6* 16.3* 18.4*    Recent Labs   Lab  06/30/19  0621 07/01/19  0526 07/02/19  0445   INR 2.5* 2.4* 2.6*        POCT Glucose: HbA1c:    Recent Labs   Lab 06/29/19  0028 06/29/19  0613 06/29/19  2208 06/30/19  2219 07/01/19  2211 07/02/19  1335   POCTGLUCOSE 171* 159* 119* 101 111* 114*    No results found for: HGBA1C       Assessment and Plan     Hospital Course:    Ms. Destinee Gallagher is a 38 y.o. female who presented to [facility] on 6/26/2019 with ETOH hepatitis and acute liver failure , associated with hepatic encephalopathy, Severe hyponatremia with sodium level of 117, ascites, coagulopathy and thrombocytopenia, as well as development of upper GI bleed on 06/27/2019, and  ZULAY on 06/27/2019    Active Hospital Problems    Diagnosis  POA    *Acute liver failure without hepatic coma [K72.00]  Yes    Infection due to strongyloides [B78.9]  Yes    Coffee ground emesis [K92.0]  Yes    Upper GI bleed [K92.2]  No    Acute blood loss anemia [D62]  No    Hepatorenal syndrome [K76.7]  No    Acute alcoholic hepatitis [K70.10]  Yes    Ascites due to alcoholic hepatitis [K70.11]  Yes     Has had hx of SBP , as per family       Hepatic encephalopathy [K72.90]  Yes    Coagulopathy [D68.9]  Yes    Hyponatremia [E87.1]  Yes    Alcohol use disorder, severe, dependence [F10.20]  Yes    Macrocytic anemia [D53.9]  Yes    Leukocytosis [D72.829]  Yes    Hypotension [I95.9]  Yes    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     Acute liver failure without hepatic coma  Acute alcoholic hepatitis with ascites  Alcoholic cirrhosis of liver with ascites     MELD-Na score: 35 at 7/2/2019  4:45 AM  MELD score: 35 at 7/2/2019  4:45 AM  Calculated from:  Serum Creatinine: 1.6 mg/dL at 7/2/2019  4:45  AM  Serum Sodium: 135 mmol/L at 7/2/2019  4:45 AM  Total Bilirubin: 31.4 mg/dL at 7/2/2019  4:45 AM  INR(ratio): 2.6 at 7/2/2019  4:45 AM  Age: 38 years     - decompensation due to recent alcohol binge drinking.  Meld score of 35 on admission  - hepatology consulted.  Obtaining financial approval for initiation of inpatient liver transplant evaluation  - [facility]  - viral and autoimmune markers negative  - LVP on 06/26/2019,  3.4 L drained.  No SBP found on ascitic fluid.  Needs at least p.o. ciprofloxacin prophylaxis given history of SBP in the past  - treat hypotension with midodrine.   - p.o. Lactulose and PO rifaximin for hepatic encephalopathy  - IV vitamin K for coagulopathy  - Psychiatry consulted on the case. deemed patient high risk, needs ETOH rehab  - serology for liver transplant evaluation -ordered   - SBP ppx with PO cipro as patient has had hx of SBP  - patient with decompensation on 06/27/2019 and some of the pre liver transplant testing and imaging was deferred that day. Restarted on 6/28. Needs GYN, ID, mammo, LTS, , ID + strongyloides and will need therapy   - echo stress negative for ischemia  - patient to be presented tomorrow at liver conference     Upper GI bleed  Acute blood loss anemia  - patient with coffee-ground emesis on 06/27/2019 with hemoglobin decreased from 10-8.8.  May be due to portal hypertensive gastropathy, however given history of cirrhosis, there is definitely concern for variceal bleeding  - patient started on IV ceftriaxone for SBP prophylaxis in lieu of GI bleeding  - started on IV octreotide drip and  IV PPI b.i.d. On 6/27. Planned  for EGD on 06/28/2019 but procedure was deferred due to high chance of decompensation  - will plan to transfuse for hemoglobin likely <8  - 1 unit of FFP transfusion on 06/27/2019 given significant coagulopathy with INR greater than 2 and active bleeding  - IV oct gtt and IV ppi stopped on 6/28 , IV ceftriaxone de-escalated to Cipro  prophylaxis.  Plan to monitor for further bleeding and reassess for necessity for EGD  - 1 u prbcs given on 7/1 for decreasing hbg down to 7.2.  patient may require an EGD       Hepatorenal syndrome  - creatinine increasing to 2.1 on 06/27/2019.  Creatinine on admission 1.3, patient was receiving diuretics at outside hospital.  Urine sodium is less than 20, with ascites and hypotension.  Which is highly highly concerning for hepatorenal syndrome  - continue max dose midodrine 15 t.i.d.  - patient has been on subcu octreotide and transition to octreotide drip for upper GI bleeding  - ICU consulted on 06/27/2019 given tenuous blood pressures, worsening encephalopathy, worsening renal failure, significant hyponatremia,  upper GI bleeding.  Patient will need close monitoring and possibly transfer for pressors to the ICU, when deemed appropriate by the ICU team  - cont midodrine, IV octreotide and albumin.   - 6/29 - Nephrology consulted. ICU consulted for IV pressors in setting of HRS and low BPs. Nephrology recommended current regimen and no IV pressors at this time   - IV albumin stopped 7/1 due to Cr down to 1.6 and normal serum albumin     Acute on Chronic hepatic encephalopathy  - PO lactulose  - PO rifaximin  - monitor BMs, goal of 3-4 . Monitor mental status      Alcoh ol use disorder, severe, dependence  - with history of alcoholic cirrhosis and alcoholic hepatitis leading to acute liver failure  - urine toxicology ordered  - [facility]  - Psychiatry consulted on the case- deemed patient high risk, needs ETOH rehab  - at one point, Patient explicitly stated that she does not need Alcohol Rehab . She stated that her current problem is due to alcohol binge earlier in May due to problems in personal life and associated anxiety with that.  As per , patient has underwent multiple rehabs in the past and relapsed.  The longest patient could ever go without relapsing was 1 year in early 2000s     Hyponatremia  -  sodium of 118 on admission, patient has been getting diuretics at outside hospital.  Likely hypervolemic hyponatremia in setting of decompensated cirrhosis and volume overload  - hold diuretics at this time  - given hypotension hyponatremia and relatively normal potassium, AM cortisol level checked and noted to be at 7.8.   - cosyntropin stim test to evaluated for adrenal insufficiency - test was not optimally performed and showed unlikely adrenal insufficiency   - fluid restriction to 1 L per day     Hypotension  - likely due to liver failure and decompensated hepatic cirrhosis  - patient started on midodrine 5 mg t.i.d.--> increased to 15 t.i.d. given very tenuous blood pressures  -very low threshold for  transfer to ICU for IV pressors     Leukocytosis  - WBC count of 23 on admission.  Very likely due to alcoholic hepatitis.  WBC of 21 down trending from 20/6 at outside hospital  - no SBP and cx remains ngtd  - antibiotics as above  - continue monitor     Anasarca  - likely due to liver failure and decompensated hepatic cirrhosis  - 2D echo with 65 % and Estimated PA pressure is at least 36 mmHg.     Macrocytic Anemia  Thrombocytopenia  Coagulopathy  - hemoglobin of 10 with MCV of 100 on admission.  - platelets of 54 on admission.  - INR of 2.4 on admission likely due to liver failure  - administer IV vitamin K x3 days for coagulopathy-- INR remains 2.4 despite vitamin K  - monitor platelets and hemoglobin daily  - DIC and hemolysis studies negative  - 1 u prbcs given on 7/1 for decreasing hbg down to 7.2.   - patient may require an EGD     Tobacco use disorder  - patient is a current every day smoker  - will assess for necessity of nicotine patch     Diet:  Low Na, fluid restriction   GI PPx:  ppi  DVT PPx:  scds  Goals of Care:  full      High Risk Conditions:  Liver failure     Disposition:       - patient to be presented at liver transplant committee meeting tomorrow; too sick to go home currently;

## 2019-07-04 PROBLEM — F51.04 CHRONIC INSOMNIA: Status: ACTIVE | Noted: 2019-07-04

## 2019-07-04 LAB
ALBUMIN SERPL BCP-MCNC: 3.7 G/DL (ref 3.5–5.2)
ALP SERPL-CCNC: 205 U/L (ref 55–135)
ALT SERPL W/O P-5'-P-CCNC: 6 U/L (ref 10–44)
ANION GAP SERPL CALC-SCNC: 14 MMOL/L (ref 8–16)
AST SERPL-CCNC: 50 U/L (ref 10–40)
BASOPHILS # BLD AUTO: 0.08 K/UL (ref 0–0.2)
BASOPHILS NFR BLD: 0.4 % (ref 0–1.9)
BILIRUB SERPL-MCNC: 38.5 MG/DL (ref 0.1–1)
BUN SERPL-MCNC: 27 MG/DL (ref 6–20)
CALCIUM SERPL-MCNC: 10 MG/DL (ref 8.7–10.5)
CHLORIDE SERPL-SCNC: 103 MMOL/L (ref 95–110)
CO2 SERPL-SCNC: 22 MMOL/L (ref 23–29)
CREAT SERPL-MCNC: 1.3 MG/DL (ref 0.5–1.4)
DIFFERENTIAL METHOD: ABNORMAL
EOSINOPHIL # BLD AUTO: 0.3 K/UL (ref 0–0.5)
EOSINOPHIL NFR BLD: 1.6 % (ref 0–8)
ERYTHROCYTE [DISTWIDTH] IN BLOOD BY AUTOMATED COUNT: 18 % (ref 11.5–14.5)
EST. GFR  (AFRICAN AMERICAN): >60 ML/MIN/1.73 M^2
EST. GFR  (NON AFRICAN AMERICAN): 52.2 ML/MIN/1.73 M^2
GLUCOSE SERPL-MCNC: 102 MG/DL (ref 70–110)
HCT VFR BLD AUTO: 25.3 % (ref 37–48.5)
HGB BLD-MCNC: 8.7 G/DL (ref 12–16)
IMM GRANULOCYTES # BLD AUTO: 0.18 K/UL (ref 0–0.04)
IMM GRANULOCYTES NFR BLD AUTO: 1 % (ref 0–0.5)
INR PPP: 2.4 (ref 0.8–1.2)
LYMPHOCYTES # BLD AUTO: 1 K/UL (ref 1–4.8)
LYMPHOCYTES NFR BLD: 5.5 % (ref 18–48)
MAGNESIUM SERPL-MCNC: 2.1 MG/DL (ref 1.6–2.6)
MCH RBC QN AUTO: 34.3 PG (ref 27–31)
MCHC RBC AUTO-ENTMCNC: 34.4 G/DL (ref 32–36)
MCV RBC AUTO: 100 FL (ref 82–98)
MONOCYTES # BLD AUTO: 1.1 K/UL (ref 0.3–1)
MONOCYTES NFR BLD: 5.9 % (ref 4–15)
NEUTROPHILS # BLD AUTO: 15.8 K/UL (ref 1.8–7.7)
NEUTROPHILS NFR BLD: 85.6 % (ref 38–73)
NRBC BLD-RTO: 0 /100 WBC
PHOSPHATE SERPL-MCNC: 3 MG/DL (ref 2.7–4.5)
PLATELET # BLD AUTO: 73 K/UL (ref 150–350)
PMV BLD AUTO: 12.3 FL (ref 9.2–12.9)
POCT GLUCOSE: 106 MG/DL (ref 70–110)
POCT GLUCOSE: 116 MG/DL (ref 70–110)
POCT GLUCOSE: 83 MG/DL (ref 70–110)
POCT GLUCOSE: 95 MG/DL (ref 70–110)
POTASSIUM SERPL-SCNC: 3.5 MMOL/L (ref 3.5–5.1)
PROT SERPL-MCNC: 6.1 G/DL (ref 6–8.4)
PROTHROMBIN TIME: 23.2 SEC (ref 9–12.5)
RBC # BLD AUTO: 2.54 M/UL (ref 4–5.4)
SODIUM SERPL-SCNC: 139 MMOL/L (ref 136–145)
WBC # BLD AUTO: 18.43 K/UL (ref 3.9–12.7)

## 2019-07-04 PROCEDURE — S4991 NICOTINE PATCH NONLEGEND: HCPCS | Performed by: HOSPITALIST

## 2019-07-04 PROCEDURE — 25000003 PHARM REV CODE 250: Performed by: INTERNAL MEDICINE

## 2019-07-04 PROCEDURE — 36415 COLL VENOUS BLD VENIPUNCTURE: CPT

## 2019-07-04 PROCEDURE — 25000003 PHARM REV CODE 250: Performed by: HOSPITALIST

## 2019-07-04 PROCEDURE — 90471 IMMUNIZATION ADMIN: CPT | Performed by: NURSE PRACTITIONER

## 2019-07-04 PROCEDURE — 25000003 PHARM REV CODE 250: Performed by: PHYSICIAN ASSISTANT

## 2019-07-04 PROCEDURE — 85610 PROTHROMBIN TIME: CPT

## 2019-07-04 PROCEDURE — 90715 TDAP VACCINE 7 YRS/> IM: CPT | Performed by: NURSE PRACTITIONER

## 2019-07-04 PROCEDURE — 99233 SBSQ HOSP IP/OBS HIGH 50: CPT | Mod: ,,, | Performed by: PSYCHIATRY & NEUROLOGY

## 2019-07-04 PROCEDURE — 20600001 HC STEP DOWN PRIVATE ROOM

## 2019-07-04 PROCEDURE — 63600175 PHARM REV CODE 636 W HCPCS: Performed by: NURSE PRACTITIONER

## 2019-07-04 PROCEDURE — 85025 COMPLETE CBC W/AUTO DIFF WBC: CPT

## 2019-07-04 PROCEDURE — 99233 SBSQ HOSP IP/OBS HIGH 50: CPT | Mod: ,,, | Performed by: HOSPITALIST

## 2019-07-04 PROCEDURE — 99233 PR SUBSEQUENT HOSPITAL CARE,LEVL III: ICD-10-PCS | Mod: ,,, | Performed by: HOSPITALIST

## 2019-07-04 PROCEDURE — 25000003 PHARM REV CODE 250: Performed by: NURSE PRACTITIONER

## 2019-07-04 PROCEDURE — 83735 ASSAY OF MAGNESIUM: CPT

## 2019-07-04 PROCEDURE — 80053 COMPREHEN METABOLIC PANEL: CPT

## 2019-07-04 PROCEDURE — 99233 PR SUBSEQUENT HOSPITAL CARE,LEVL III: ICD-10-PCS | Mod: ,,, | Performed by: PSYCHIATRY & NEUROLOGY

## 2019-07-04 PROCEDURE — 84100 ASSAY OF PHOSPHORUS: CPT

## 2019-07-04 RX ORDER — HYDROXYZINE PAMOATE 25 MG/1
25 CAPSULE ORAL EVERY 6 HOURS PRN
Status: DISCONTINUED | OUTPATIENT
Start: 2019-07-04 | End: 2019-07-05 | Stop reason: HOSPADM

## 2019-07-04 RX ORDER — CIPROFLOXACIN 500 MG/1
500 TABLET ORAL EVERY 24 HOURS
Status: DISCONTINUED | OUTPATIENT
Start: 2019-07-04 | End: 2019-07-05 | Stop reason: HOSPADM

## 2019-07-04 RX ADMIN — RAMELTEON 8 MG: 8 TABLET, FILM COATED ORAL at 10:07

## 2019-07-04 RX ADMIN — HYDROXYZINE PAMOATE 25 MG: 25 CAPSULE ORAL at 10:07

## 2019-07-04 RX ADMIN — PANTOPRAZOLE SODIUM 40 MG: 40 TABLET, DELAYED RELEASE ORAL at 06:07

## 2019-07-04 RX ADMIN — LIDOCAINE 1 PATCH: 50 PATCH TOPICAL at 09:07

## 2019-07-04 RX ADMIN — FOLIC ACID 1 MG: 1 TABLET ORAL at 09:07

## 2019-07-04 RX ADMIN — IVERMECTIN 12000 MCG: 3 TABLET ORAL at 09:07

## 2019-07-04 RX ADMIN — MIDODRINE HYDROCHLORIDE 15 MG: 5 TABLET ORAL at 02:07

## 2019-07-04 RX ADMIN — SODIUM BICARBONATE 650 MG TABLET 1300 MG: at 09:07

## 2019-07-04 RX ADMIN — HYDROXYZINE PAMOATE 25 MG: 25 CAPSULE ORAL at 09:07

## 2019-07-04 RX ADMIN — Medication 100 MG: at 09:07

## 2019-07-04 RX ADMIN — CLOSTRIDIUM TETANI TOXOID ANTIGEN (FORMALDEHYDE INACTIVATED), CORYNEBACTERIUM DIPHTHERIAE TOXOID ANTIGEN (FORMALDEHYDE INACTIVATED), BORDETELLA PERTUSSIS TOXOID ANTIGEN (GLUTARALDEHYDE INACTIVATED), BORDETELLA PERTUSSIS FILAMENTOUS HEMAGGLUTININ ANTIGEN (FORMALDEHYDE INACTIVATED), BORDETELLA PERTUSSIS PERTACTIN ANTIGEN, AND BORDETELLA PERTUSSIS FIMBRIAE 2/3 ANTIGEN 0.5 ML: 5; 2; 2.5; 5; 3; 5 INJECTION, SUSPENSION INTRAMUSCULAR at 09:07

## 2019-07-04 RX ADMIN — RIFAXIMIN 550 MG: 550 TABLET ORAL at 09:07

## 2019-07-04 RX ADMIN — NICOTINE 1 PATCH: 21 PATCH, EXTENDED RELEASE TRANSDERMAL at 09:07

## 2019-07-04 RX ADMIN — CIPROFLOXACIN HYDROCHLORIDE 500 MG: 500 TABLET, FILM COATED ORAL at 11:07

## 2019-07-04 RX ADMIN — MIDODRINE HYDROCHLORIDE 15 MG: 5 TABLET ORAL at 09:07

## 2019-07-04 RX ADMIN — LACTULOSE 20 G: 20 SOLUTION ORAL at 09:07

## 2019-07-04 RX ADMIN — PROMETHAZINE HYDROCHLORIDE 12.5 MG: 12.5 TABLET ORAL at 09:07

## 2019-07-04 RX ADMIN — PANTOPRAZOLE SODIUM 40 MG: 40 TABLET, DELAYED RELEASE ORAL at 04:07

## 2019-07-04 NOTE — PROGRESS NOTES
Progress Note  Hospital Medicine  [facility], [place]       Patient Name: Destinee Gallagher  MRN:  [id number]  Hospital Medicine Team: [facility] [place] [facility] MED L Darrick Knapp MD  Date of Admission:  6/26/2019     Length of Stay:  LOS: 8 days   Expected Discharge Date: 7/5/2019  Principal Problem:  Acute liver failure without hepatic coma     Subjective:     Interval History/Overnight Events:    - patient in a good mood today, sister at the bedside; psychiatry evaluated patient and do not believe patient has bipolar disorder and think it is just MDD and no need for treatment; patient will be re-discussed at liver meeting tomorrow afternoon;   - no other issues over night    - needs mammogram in the AM, ordered, will need to be scheduled in the AM     ciprofloxacin HCl  500 mg Oral Daily    folic acid  1 mg Oral Daily    lactulose  20 g Oral TID    lidocaine  2 patch Transdermal Q24H    midodrine  15 mg Oral TID    nicotine  1 patch Transdermal Daily    pantoprazole  40 mg Oral BID AC    rifAXImin  550 mg Oral BID    sodium bicarbonate  1,300 mg Oral BID    thiamine  100 mg Oral Daily           acetaminophen, Dextrose 10% Bolus, Dextrose 10% Bolus, glucagon (human recombinant), glucose, glucose, hepatitis B, hydrOXYzine pamoate, polyethylene glycol, promethazine, ramelteon, sodium chloride 0.9%, traMADol    Review of Systems   Constitutional: Negative for chills, fatigue, fever.   HENT: Negative for sore throat, trouble swallowing.    Eyes: Negative for photophobia, visual disturbance.   Respiratory: Negative for cough, shortness of breath.    Cardiovascular: Negative for chest pain, palpitations, leg swelling.   Gastrointestinal: Negative for abdominal pain, constipation, diarrhea, nausea, vomiting.   Endocrine: Negative for cold intolerance, heat intolerance.   Genitourinary: Negative for dysuria, frequency.   Musculoskeletal: Negative for arthralgias, myalgias.   Skin: Negative for rash, wound,  erythema   Neurological: Negative for dizziness, syncope, weakness, light-headedness.   Psychiatric/Behavioral: Negative for confusion, hallucinations, anxiety  All other systems reviewed and are negative.    Objective:     Temp:  [97.9 °F (36.6 °C)-98.9 °F (37.2 °C)]   Pulse:  []   Resp:  [18]   BP: (109-125)/(65-80)   SpO2:  [92 %-96 %]       Physical Exam:  Constitutional: appears older than stated age, chronically ill looking,  non-distressed, not diaphoretic.   HENT: NC/AT, external ears normal, oropharynx clear, MMM w/o exudates.   Eyes: PERRL, EOMI, conjunctiva normal, no discharge b/l, +scleral icterus   Neck: normal ROM, supple  CV: RRR, no m/r/g, no carotid bruits, +2 peripheral pulses.  Pulmonary/Chest wall: Breathing comfortably w/o distress, CTAB, no w/r/r, no crackles.  Decreased breath sounds at lung bases  GI: Soft, non-tender, (+) BS, (+) BM   +distended, tender palpation    Musculoskeletal: Normal ROM, no atrophy,  +no pitting edema in LE bilaterally   Neurological: AAO x 4, CN II-XI in tact, nl sensation, nl strength/tone    No asterixis   Skin: warm, dry   + jaundice, + significant spider angiomas, +bruising   Psych: normal mood and affect, normal behavior, thought content and judgement.    Labs:    Chemistries:   Recent Labs   Lab 07/02/19  0445 07/03/19  0342 07/04/19  0502   * 135* 139   K 3.5 3.8 3.5    101 103   CO2 20* 22* 22*   BUN 52* 41* 27*   CREATININE 1.6* 1.3 1.3   CALCIUM 10.1 10.1 10.0   PROT 6.4 6.3 6.1   BILITOT 31.4* 34.4* 38.5*   ALKPHOS 214* 207* 205*   ALT 8* 7* 6*   AST 66* 54* 50*   MG 2.6 2.4 2.1   PHOS 3.6 2.9 3.0        WBC:   Recent Labs   Lab 06/30/19  0621 07/01/19  0526 07/02/19  0446 07/03/19  0343 07/04/19  0502   WBC 17.64* 14.56* 16.47* 17.93* 18.43*     Bands:     CBC/Anemia Labs: Coags:    Recent Labs   Lab 07/02/19  0446 07/03/19  0343 07/04/19  0502   WBC 16.47* 17.93* 18.43*   HGB 8.5* 8.5* 8.7*   HCT 24.6* 23.8* 25.3*   PLT 58* 70* 73*    MCV 99* 97 100*   RDW 18.4* 18.3* 18.0*    Recent Labs   Lab 07/02/19  0445 07/03/19  0343 07/04/19  0502   INR 2.6* 2.4* 2.4*        POCT Glucose: HbA1c:    Recent Labs   Lab 07/03/19  0721 07/03/19  1126 07/03/19  2100 07/04/19  0717 07/04/19  1127 07/04/19  1634   POCTGLUCOSE 129* 140* 108 116* 106 83    No results found for: HGBA1C       Assessment and Plan     Hospital Course:    Ms. Destinee Gallagher is a 38 y.o. female who presented to [facility] on 6/26/2019 with ETOH hepatitis and acute liver failure , associated with hepatic encephalopathy, Severe hyponatremia with sodium level of 117, ascites, coagulopathy and thrombocytopenia, as well as development of upper GI bleed on 06/27/2019, and  ZULAY on 06/27/2019    Active Hospital Problems    Diagnosis  POA    *Acute liver failure without hepatic coma [K72.00]  Yes    Chronic insomnia [F51.04]  Yes    Organ transplant candidate [Z76.82]  Not Applicable    Pre-transplant evaluation for liver transplant [Z01.818]  Not Applicable    Infection due to strongyloides [B78.9]  Yes    Coffee ground emesis [K92.0]  Yes    Upper GI bleed [K92.2]  No    Acute blood loss anemia [D62]  No    Hepatorenal syndrome [K76.7]  No    Acute alcoholic hepatitis [K70.10]  Yes    Ascites due to alcoholic hepatitis [K70.11]  Yes     Has had hx of SBP , as per family       Hepatic encephalopathy [K72.90]  Yes    Coagulopathy [D68.9]  Yes    Hyponatremia [E87.1]  Yes    Alcohol use disorder, severe, dependence [F10.20]  Yes    Macrocytic anemia [D53.9]  Yes    Leukocytosis [D72.829]  Yes    Hypotension [I95.9]  Yes    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     Acute liver failure without hepatic  coma  Acute alcoholic hepatitis with ascites  Alcoholic cirrhosis of liver with ascites     MELD-Na score: 33 at 7/4/2019  5:02 AM  MELD score: 33 at 7/4/2019  5:02 AM  Calculated from:  Serum Creatinine: 1.3 mg/dL at 7/4/2019  5:02 AM  Serum Sodium: 139 mmol/L (Rounded to 137 mmol/L) at 7/4/2019  5:02 AM  Total Bilirubin: 38.5 mg/dL at 7/4/2019  5:02 AM  INR(ratio): 2.4 at 7/4/2019  5:02 AM  Age: 38 years     - decompensation due to recent alcohol binge drinking.  Meld score of 35 on admission  - hepatology consulted.  Obtaining financial approval for initiation of inpatient liver transplant evaluation  - [facility]  - viral and autoimmune markers negative  - LVP on 06/26/2019,  3.4 L drained.  No SBP found on ascitic fluid.  Needs at least p.o. ciprofloxacin prophylaxis given history of SBP in the past  - treat hypotension with midodrine.   - p.o. Lactulose and PO rifaximin for hepatic encephalopathy  - IV vitamin K for coagulopathy  - Psychiatry consulted on the case. deemed patient high risk, needs ETOH rehab  - serology for liver transplant evaluation -ordered   - SBP ppx with PO cipro as patient has had hx of SBP  - patient with decompensation on 06/27/2019 and some of the pre liver transplant testing and imaging was deferred that day. Restarted on 6/28. Needs GYN, ID, mammo, LTS, , ID + strongyloides and will need therapy   - echo stress negative for ischemia  - patient presented at liver transplant meeting today and is deferred until psych evaluates patient for bipolar disorder and if treatment is indicated     Upper GI bleed  Acute blood loss anemia  - patient with coffee-ground emesis on 06/27/2019 with hemoglobin decreased from 10-8.8.  May be due to portal hypertensive gastropathy, however given history of cirrhosis, there is definitely concern for variceal bleeding  - patient started on IV ceftriaxone for SBP prophylaxis in lieu of GI bleeding  - started on IV octreotide drip and  IV PPI b.i.d. On 6/27.  Planned  for EGD on 06/28/2019 but procedure was deferred due to high chance of decompensation  - will plan to transfuse for hemoglobin likely <8  - 1 unit of FFP transfusion on 06/27/2019 given significant coagulopathy with INR greater than 2 and active bleeding  - IV oct gtt and IV ppi stopped on 6/28 , IV ceftriaxone de-escalated to Cipro prophylaxis.  Plan to monitor for further bleeding and reassess for necessity for EGD  - 1 u prbcs given on 7/1 for decreasing hbg down to 7.2.  patient may require an EGD       Hepatorenal syndrome  - creatinine increasing to 2.1 on 06/27/2019.  Creatinine on admission 1.3, patient was receiving diuretics at outside hospital.  Urine sodium is less than 20, with ascites and hypotension.  Which is highly highly concerning for hepatorenal syndrome  - continue max dose midodrine 15 t.i.d.  - patient has been on subcu octreotide and transition to octreotide drip for upper GI bleeding  - ICU consulted on 06/27/2019 given tenuous blood pressures, worsening encephalopathy, worsening renal failure, significant hyponatremia,  upper GI bleeding.  Patient will need close monitoring and possibly transfer for pressors to the ICU, when deemed appropriate by the ICU team  - cont midodrine, IV octreotide and albumin.   - 6/29 - Nephrology consulted. ICU consulted for IV pressors in setting of HRS and low BPs. Nephrology recommended current regimen and no IV pressors at this time   - IV albumin stopped 7/1 due to Cr down to 1.3 and normal serum albumin     Acute on Chronic hepatic encephalopathy  - PO lactulose  - PO rifaximin  - monitor BMs, goal of 3-4 . Monitor mental status      Alcoh ol use disorder, severe, dependence  - with history of alcoholic cirrhosis and alcoholic hepatitis leading to acute liver failure  - urine toxicology ordered  - [facility]  - Psychiatry consulted on the case- deemed patient high risk, needs ETOH rehab  - at one point, Patient explicitly stated that she does not  need Alcohol Rehab . She stated that her current problem is due to alcohol binge earlier in May due to problems in personal life and associated anxiety with that.  As per , patient has underwent multiple rehabs in the past and relapsed.  The longest patient could ever go without relapsing was 1 year in early 2000s     Hyponatremia  - sodium of 118 on admission, patient has been getting diuretics at outside hospital.  Likely hypervolemic hyponatremia in setting of decompensated cirrhosis and volume overload  - hold diuretics at this time  - given hypotension hyponatremia and relatively normal potassium, AM cortisol level checked and noted to be at 7.8.   - cosyntropin stim test to evaluated for adrenal insufficiency - test was not optimally performed and showed unlikely adrenal insufficiency   - fluid restriction to 1 L per day     Hypotension  - likely due to liver failure and decompensated hepatic cirrhosis  - patient started on midodrine 5 mg t.i.d.--> increased to 15 t.i.d. given very tenuous blood pressures  -very low threshold for  transfer to ICU for IV pressors     Leukocytosis  - WBC count of 23 on admission.  Very likely due to alcoholic hepatitis.  WBC of 21 down trending from 20/6 at outside hospital  - no SBP and cx remains ngtd  - antibiotics as above  - continue monitor     Anasarca  - likely due to liver failure and decompensated hepatic cirrhosis  - 2D echo with 65 % and Estimated PA pressure is at least 36 mmHg.     Macrocytic Anemia  Thrombocytopenia  Coagulopathy  - hemoglobin of 10 with MCV of 100 on admission.  - platelets of 54 on admission.  - INR of 2.4 on admission likely due to liver failure  - administer IV vitamin K x3 days for coagulopathy-- INR remains 2.4 despite vitamin K  - monitor platelets and hemoglobin daily  - DIC and hemolysis studies negative  - 1 u prbcs given on 7/1 for decreasing hbg down to 7.2.   - patient may require an EGD     Tobacco use disorder  - patient is a  current every day smoker  - will assess for necessity of nicotine patch    # MDD  - psych evaluated patient and does not think patient has bipolar disorder and think she just has MDD; no medications needed for bipolar      Diet:  Low Na, fluid restriction   GI PPx:  ppi  DVT PPx:  scds  Goals of Care:  full      High Risk Conditions:  Liver failure     Disposition:       - psych evaluated patient, no bipolar d/o; plan for re-presentation tomorrow at liver meeting; too sick to leave the hospital

## 2019-07-04 NOTE — PLAN OF CARE
Problem: Adult Inpatient Plan of Care  Goal: Plan of Care Review  Outcome: Ongoing (interventions implemented as appropriate)  Plan of care reviewed with patient and .  remain at bedside overnight. Patient had nose bleed overnight, VS stable.  Safety maintained, call light in reach, bed in lowest position, will continue to monitor.

## 2019-07-04 NOTE — CONSULTS
7/3/2019 9:12 PM  Destinee Gallagher  1980  [id number]     General Psychiatry Consult Note     Consult Requested By: Darrick Knapp MD     SUBJECTIVE      History of Present Illness:   Destinee Gallagher is a 38 y.o. female with a past psychiatric history of Severe Alcohol Use Disorder and Bipolar Disorder, who presented as a transfer from  [facility] in [place], [place], for liver transplantation evaluation. Psychiatry was consulted to address the patient's history of bipolar disorder.      Per Primary MD:  Ms. Destinee Gallagher is a 38 y.o. female with hx of ETOH cirrhosis and ETOH hepatitis, hx of alcohol abuse disorder/ severe alcohol dependence, prior history of coagulopathy and thrombocytopenia, as well as history of SBP, presented to [facility] on 06/26/2019 as a transfer from  [facility] in [place], [place], for liver transplantation evaluation as well as hepatology evaluation. Patient was admitted to OSH 6/6 with abdominal pain, nausea, decreased oral intake, and visibly jaundice.  Over past 2.5 weeks there , pt has been diagnosed with and treated for SBP.  She has been continued on diuretics, but ascites has been resistant to diuretics , requiring multiple paracenteses (last LVP several days prior to transfer).  Most recent paracentesis did not meet criteria for SBP, and had no PMNs, but pt remained on Rocephin for leukocytosis of unclear source.  She has been encephalopathic but slowly improving with increased doses of Lactulose and Rifaxamin.  Pt has not improved with current treatment, thus requesting liver transplant evaluation, Case d/w Dr. Posadas, patient transferred to [facility]. MELD 35 upon presentation at [facility]. Last ETOH drink on 6/3     Upon presentation at [facility], patient was afebrile with systolic blood pressures in the 90s satting 95% on room air.  She was mildly encephalopathic, however was able to participate in conversation,  at bedside.  Meld 35 on presentation.  Labs  "notable for total bilirubin of 28, sodium of 118 (patient has been getting in diuretics at the outside hospital), INR of 2.4.  WBC of 23, platelets 54.      Upon further questioning regarding patient's disease process, she stated that she has had history of heavy alcohol use, most recently 1-2 pt of vodka per day.  Patient's last alcoholic drink was on 06/03/2019, approximately.  Patient did not state that she has been binge drinking, as noted above 2 pt of vodka per day, prior to her admission at outside hospital.  Patient also uses tobacco.  She stated that she was 1st diagnosed with alcoholic cirrhosis and alcoholic hepatitis in August 2018 and was advised to stop drinking at that time.  Patient has had multiple relapses since then.  It appears the patient has failed multiple alcohol rehabs in the past.  In the last couple of months patient has had recurrent problem with worsening ascites and abdominal distention.     Per Psych MD:  Chart reviewed. UDS+ opiates.     On interview, patient was bright, lively, and hopeful. In contrast with previous interview, she was alerted and oriented. Patient states that she was first brought to a psychiatrist by her mother at 2 years old, who brought her to various psychiatrists who medicated her "like a guinea pig." Patient states she had tried lithium and depakote, but believes that she stopped taking any kind of mood stabilizer in her late teens or early twenties. Patient does not believe she has bipolar disorder and states that all of her problems have been 2/2 ADHD and alcohol abuse. Patient states that she has had episodes of depression or increased energy previously in her life. She believes that her last episode of severe depression was in her mid twenties, during which she attempted an overdose with leftover lithium pills. She does not recall her last manic episode. States the only time she remembers having had decreased sleep > 4 days associated with goal directed " "activity or elevated mood was earlier in June 2019, during an episode of liver failure. At that time she was hyperammonemic and reported seeing little people in her room  And believing they were out to get her. Mood normalized following the correction of ammonia. At this time, she denies depression, hopelessness, poor sleep, guilt, poor appetite, tearfulness. Does report occasional anxiety + "tremors" that are well controlled with vistaril. She does not exhibit pressured speech, grandiose or paranoid delusions, irritability, or expansiveness. Of note, patient states she is not interested in taking further psychiatric medication, does not need it, and will not take it.      Collateral:   Yes-- collateral from  previously on 6/26/19   " reports a history of being 'manic depressive', and manic episodes consist of periods with decreased need for sleep and excessive spending. Reports previous med trials of Lithium and Depakote and antidepressants, but patient has not been on any psychiatric medications and  cannot recall last manic episode. Does admit that patient has high anxiety. Patient stable from a psychiatric standpoint - patient denies SI, HI, and AVH and does not demonstrate objective signs/symptoms of erum, psychosis, or affective instability to the point of suicidality or homicidality.  does admit that patient demonstrated paranoia and had VH of people who were not there earlier this month, and believes this was 2/2 elevated ammonia. "     Information also corroborated by sister.      Substance Abuse History:  Substance of Choice: alcohol  Substances Used: remote history of marijuana use when younger; none current other than alcohol   History of IVDU?: No  Use of Alcohol: heavy  Average Consumption: Yes - 2 pints of vodka daily  Last Drink: Yes - Gayla 2-3  Use of Medications for Alcohol/Opioid Use Disorder: Yes - per  (while she was in rehab), but does not know the specific " medications. He states that they always caused GI upset  History of Complicated Withdrawal(s): Yes -  reports recent VH likely associated with elevated ammonia in the past month  History of Detox: Yes - multiple, most recently at [facility] last year   Rehab History: Yes - multiple  AA/NA involvement: Yes - believes they are helpful but most AA groups are all men and she prefers groups with women  Tobacco: Yes - 1ppd  Spouse/Partner Consumption: Yes - occasional use  Patient Aware of Biomedical Complications: Yes    DSM-5 Substance Use Disorder Criteria:  1. Often take in larger amounts or over a longer period of time than was intended: Yes  2. Persistent desire or unsuccessful efforts to cut down or control use: Yes  3. Great deal of time spent in activities necessary to obtain substance, use, or recover from effects: Yes  4. Craving/strong desire for substance or urge to use: Yes  5. Use resulting in failure to fulfill major role obligations at home, work or school: Yes  6. Social, occupational, recreational activities decreased because of use: Yes  7. Continued use despite having persistent or recurrent social or interpersonal problems cause or exaserbated by the substance: Yes  8. Recurrent use in situations in which it is physically hazardous: Yes, DUI  9. Use despite physical or psychological problems that are likely to have been caused or exacerbated by the substance: Yes  10. Tolerance, as defined by either of the following: Yes              A. A need for markedly increased amounts of substance to achieve intoxication or desired effect. -OR-               B. A markedly diminished effect with continued use of the same amount of substance.  11. Withdrawal, as manifested by the following: Yes              A. The characteristic withdrawal syndrome for substance. -AND-              B. Substance is taken to relieve or avoid withdrawal symptoms.  Mild (1-3), Moderate (4-5), Severe (?6)     Psychiatric  "History:  Diagnose(s): Yes - "manic depressive," ADHD, alcohol abuse, anxiety   Previous Medication Trials: Yes - Lithium, Depakote, antidepressants  Previous Psychiatric Hospitalizations: Yes - was in a dual diagnosis unit  Family Psychiatric History: Yes - maternal uncles with alcoholism, substance use runs in family  Outpatient Psychiatrist: No        Suicide/Violence Risk Assessment:  Current/active suicidal ideation/plan/intent: No  Previous suicide attempts: Yes  Current/active homicidal ideation/plan/intent: No  History of threats/arrests associated with violent conduct - No  Access to firearms/lethal weapons - Yes -  has two guns but they are locked up     Social History:  Marital Status:   Children: 2 - 12yo and 14yo  Employment Status: unemployed - unable to work 2/2 alcoholism  Education: high school diploma/GED  Special Ed: no  Housing Status: Yes - with  and two children  Developmental History: Unremarkable  History of Abuse: Yes      Legal History:  Past Charges/Incarcerations: Yes - DUI  Pending Charges: No     Psychosocial Factors:  Stressors: health,  says that finances stress out patient but are financially stable   Functioning Relationships: good support system  Maladaptive or problem behaviors: No  Peer group, social, ethic, cultural, emotional, and health factors: No  Living situation, family constellation, family circumstances/home: No  Recovery environment: No  Community resources used by patient: Yes - attempted rehab multiple times, AA meetings  Treatment acceptance/motivation for change: Yes     Psychiatric Review of Systems:  sleep: no  appetite: yes, poor  weight: no  energy/anergy: no  interest/pleasure/anhedonia: no  somatic symptoms: no  guilty/hopelessness: no  concentration: no  S.I.B.s/risky behavior: no  SI/SA:  no     anxiety/panic: yes  Agoraphobia:  no  Social phobia:  no  Recurrent nightmares:  yes  hyper startle response:  no  Avoidance: " no  Recurrent thoughts:  no  Recurrent behaviors:  no     Irritability: no  Racing thoughts: no  Impulsive behaviors: no  Pressured speech:  no     Paranoia:no  Delusions: no  AVH:no     Medical Review of Systems:  Pertinent items noted in HPI     Allergies:  Zofran [ondansetron hcl (pf)]  Medical/Surgical History:  Past Medical History:   Diagnosis Date    Acute alcoholic hepatitis 2019    Acute liver failure without hepatic coma 2019    Alcohol use disorder, severe, dependence 2019    Coagulopathy 2019    Hepatic encephalopathy 2019    Hyponatremia 2019    Thrombocytopenia due to hypersplenism 2019            Past Surgical History:   Procedure Laterality Date     SECTION        CHOLECYSTECTOMY          OBJECTIVE      Current Medications:  Infusions:  Scheduled:   folic acid  1 mg Oral Daily    ivermectin  200 mcg/kg Oral Daily    lactulose  20 g Oral TID    lidocaine  2 patch Transdermal Q24H    midodrine  15 mg Oral TID    nicotine  1 patch Transdermal Daily    pantoprazole  40 mg Oral BID AC    rifAXImin  550 mg Oral BID    sodium bicarbonate  1,300 mg Oral BID    Tdap vaccine  0.5 mL Intramuscular Once    thiamine  100 mg Oral Daily      PRN:  acetaminophen, Dextrose 10% Bolus, Dextrose 10% Bolus, glucagon (human recombinant), glucose, glucose, hepatitis B, hydrOXYzine pamoate, polyethylene glycol, promethazine, ramelteon, sodium chloride 0.9%, traMADol  Home Medications:          Prior to Admission medications    Medication Sig Start Date End Date Taking? Authorizing Provider   magnesium citrate solution Take 296 mLs by mouth once.     Yes Historical Provider, MD   multivitamin (THERAGRAN) per tablet Take 1 tablet by mouth once daily.     Yes Historical Provider, MD   omeprazole (PRILOSEC) 20 MG capsule Take 20 mg by mouth once daily.     Yes Historical Provider, MD   spironolactone (ALDACTONE) 100 MG tablet Take 100 mg by mouth once daily.     Yes  "Historical Provider, MD      Vital Signs:      Vitals:     07/03/19 1931   BP: 124/70   Pulse: 90   Resp: 18   Temp: 97.9 °F (36.6 °C)      Mental Status Exam:  Appearance: unremarkable, age appropriate, jaundiced  Level of Consciousness: alert  Behavior/Cooperation: normal, friendly and cooperative  Psychomotor: unremarkable   Speech: normal tone, normal rate, normal pitch, normal volume  Language: english, fluid  Orientation: grossly intact, person, place, situation, time/date, month of year, year  Attention Span/Concentration: intact  Memory: Intact  Mood: "good, hopeful"  Affect: normal  Thought Process: linear, normal and logical  Associations: normal and logical  Thought Content: normal, no suicidality, no homicidality, delusions, or paranoia  Fund of Knowledge: Aware of current events and Intact  Abstraction: proverbs were abstract  Insight: good  Judgment: good     Labs/Imaging/Studies:   Recent Results         Recent Results (from the past 24 hour(s))   POCT glucose     Collection Time: 07/02/19  9:46 PM   Result Value Ref Range     POCT Glucose 96 70 - 110 mg/dL   Comprehensive Metabolic Panel (CMP)     Collection Time: 07/03/19  3:42 AM   Result Value Ref Range     Sodium 135 (L) 136 - 145 mmol/L     Potassium 3.8 3.5 - 5.1 mmol/L     Chloride 101 95 - 110 mmol/L     CO2 22 (L) 23 - 29 mmol/L     Glucose 129 (H) 70 - 110 mg/dL     BUN, Bld 41 (H) 6 - 20 mg/dL     Creatinine 1.3 0.5 - 1.4 mg/dL     Calcium 10.1 8.7 - 10.5 mg/dL     Total Protein 6.3 6.0 - 8.4 g/dL     Albumin 3.9 3.5 - 5.2 g/dL     Total Bilirubin 34.4 (H) 0.1 - 1.0 mg/dL     Alkaline Phosphatase 207 (H) 55 - 135 U/L     AST 54 (H) 10 - 40 U/L     ALT 7 (L) 10 - 44 U/L     Anion Gap 12 8 - 16 mmol/L     eGFR if African American >60.0 >60 mL/min/1.73 m^2     eGFR if non  52.2 (A) >60 mL/min/1.73 m^2   Magnesium     Collection Time: 07/03/19  3:42 AM   Result Value Ref Range     Magnesium 2.4 1.6 - 2.6 mg/dL   Phosphorus     " Collection Time: 07/03/19  3:42 AM   Result Value Ref Range     Phosphorus 2.9 2.7 - 4.5 mg/dL   PT/INR     Collection Time: 07/03/19  3:43 AM   Result Value Ref Range     Prothrombin Time 23.2 (H) 9.0 - 12.5 sec     INR 2.4 (H) 0.8 - 1.2   CBC with Automated Differential     Collection Time: 07/03/19  3:43 AM   Result Value Ref Range     WBC 17.93 (H) 3.90 - 12.70 K/uL     RBC 2.46 (L) 4.00 - 5.40 M/uL     Hemoglobin 8.5 (L) 12.0 - 16.0 g/dL     Hematocrit 23.8 (L) 37.0 - 48.5 %     Mean Corpuscular Volume 97 82 - 98 fL     Mean Corpuscular Hemoglobin 34.6 (H) 27.0 - 31.0 pg     Mean Corpuscular Hemoglobin Conc 35.7 32.0 - 36.0 g/dL     RDW 18.3 (H) 11.5 - 14.5 %     Platelets 70 (L) 150 - 350 K/uL     MPV 12.5 9.2 - 12.9 fL     Immature Granulocytes 1.2 (H) 0.0 - 0.5 %     Gran # (ANC) 15.6 (H) 1.8 - 7.7 K/uL     Immature Grans (Abs) 0.21 (H) 0.00 - 0.04 K/uL     Lymph # 0.9 (L) 1.0 - 4.8 K/uL     Mono # 0.8 0.3 - 1.0 K/uL     Eos # 0.3 0.0 - 0.5 K/uL     Baso # 0.08 0.00 - 0.20 K/uL     nRBC 0 0 /100 WBC     Gran% 87.0 (H) 38.0 - 73.0 %     Lymph% 4.9 (L) 18.0 - 48.0 %     Mono% 4.6 4.0 - 15.0 %     Eosinophil% 1.9 0.0 - 8.0 %     Basophil% 0.4 0.0 - 1.9 %     Differential Method Automated     POCT glucose     Collection Time: 07/03/19  7:21 AM   Result Value Ref Range     POCT Glucose 129 (H) 70 - 110 mg/dL   POCT glucose     Collection Time: 07/03/19 11:26 AM   Result Value Ref Range     POCT Glucose 140 (H) 70 - 110 mg/dL   Lactic acid, plasma     Collection Time: 07/03/19 11:42 AM   Result Value Ref Range     Lactate (Lactic Acid) 1.4 0.5 - 2.2 mmol/L   Urinalysis, Reflex to Urine Culture Urine, Clean Catch     Collection Time: 07/03/19  4:13 PM   Result Value Ref Range     Specimen UA Urine, Clean Catch       Color, UA Radha Yellow, Straw, Radha     Appearance, UA Hazy (A) Clear     pH, UA 6.0 5.0 - 8.0     Specific Gravity, UA 1.020 1.005 - 1.030     Protein, UA 1+ (A) Negative     Glucose, UA 1+ (A)  "Negative     Ketones, UA 1+ (A) Negative     Bilirubin (UA) 2+ (A) Negative     Occult Blood UA 1+ (A) Negative     Nitrite, UA Positive (A) Negative     Leukocytes, UA Negative Negative   Urinalysis Microscopic     Collection Time: 07/03/19  4:13 PM   Result Value Ref Range     RBC, UA 1 0 - 4 /hpf     WBC, UA 5 0 - 5 /hpf     Bacteria None None-Occ /hpf     Squam Epithel, UA 14 /hpf     Hyaline Casts, UA 0 0-1/lpf /lpf     Microscopic Comment SEE COMMENT           ASSESSMENT      Destinee Gallagher is a 38 y.o. female with a past psychiatric history of reported "manic depression" and ADHD, who [place] [facility] [place] due to liver failure. Psychiatry was consulted to address the patient's symptoms of history of bipolar disorder.     Patient's history of reported bipolar disorder is irregular- she describes diagnosis during childhood, which is quite unusual, and per both patient and her family she has been stable without medication since her mid twenties. Does not appear to ever have been hospitalized for a manic episode. No clinical evidence that patient is depressed or manic at this time- appears euthymic, pleasant, with appropriate speech and affect. Furthermore, patient is insistent that she does not need and will not take any mood stabilizers and that her only symptom, anxiety, is well controlled with hydroxyzine. As patient is euthymic currently with no complaints and suspect and irregular history of bipolar disorder, prophylactic stabilization with medication is not indicated at this time.      Thank you for this consult.      IMPRESSION  Alcohol use disorder, severe       RECOMMENDATION(S)      1. Scheduled Medication(s):  Per primary team     2. PRN Medication(s):  Vistaril PRN for anxiety      3. Other:  Recommended that patient attend IOP or inpatient treatment for education on alcohol abuse, for coping/behavioral skills to reinforce sobriety, as well as to attend AA.     In cases of emergency, daily " coverage provided by Acute/ER Psych MD, NP, or SW, with contact numbers located in [facility] On Call Schedule     Case discussed with Dr. Rios.      STAFF NOTE:  I have reviewed and concur with Dr. Harris's history, assessment, and plan.  Case formally discussed on rounds.  I have personally interviewed and examined the patient at bedside.  38 y.o. female with a past psychiatric history of Severe Alcohol Use Disorder and Bipolar Disorder, who presented as a transfer from  [facility] in [place], [place], for liver transplantation evaluation. Psychiatry was consulted to address the patient's history of bipolar disorder.  Pt was fully awake, alert and generally cooperative with interview.  Her affect appeared euthymic in general; she actually joked around some and laughed appropriately with her sister in the room.  Her speech was normal rate, rhythm and tone, though she had some brusque responses at times.  She denied depressed mood in general, though she did admit to missing her children, who are in [place] currently with other family members.  She also feels despondent about her current medical situation, though she states she realizes much of this was due to her chronic heavy alcohol use.  She denied a Hx of erum, psychosis, hopelessness or SI.  She stated she had not been suicidal for many years.  She did admit to chronic problems with anxiety and insomnia.  She did NOT want to take any antidepressant medications.  She was open to the idea of taking Vistaril PRN for anxiety and ramelteon for sleep.      IMP:  Alcohol use disorder, severe, dependence   Chronic insomnia    Doubt bipolar disorder or MDD based on above noted information; possible adjustment disorder with depression/anxiety  Alcoholic cirrhosis of liver with ascites     REC:  As per Dr. Harris's note: can use Vistaril 25 mg q 6 hrs prn anxiety.  Continue ramelteon 8 mg qhs.     Will sign off at this time --  reconsult prn.        Jose Martin Rios MD  Psychiatry Staff   [facility]

## 2019-07-04 NOTE — HPI
History of Present Illness:   Destinee Gallagher is a 38 y.o. female with a past psychiatric history of Severe Alcohol Use Disorder and Bipolar Disorder, who presented as a transfer from  [facility] in [place], [place], for liver transplantation evaluation. Psychiatry was consulted to address the patient's history of bipolar disorder.     Per Primary MD:  Ms. Destinee Gallagher is a 38 y.o. female with hx of ETOH cirrhosis and ETOH hepatitis, hx of alcohol abuse disorder/ severe alcohol dependence, prior history of coagulopathy and thrombocytopenia, as well as history of SBP, presented to [facility] on 06/26/2019 as a transfer from  [facility] in [place], [place], for liver transplantation evaluation as well as hepatology evaluation. Patient was admitted to OSH 6/6 with abdominal pain, nausea, decreased oral intake, and visibly jaundice.  Over past 2.5 weeks there , pt has been diagnosed with and treated for SBP.  She has been continued on diuretics, but ascites has been resistant to diuretics , requiring multiple paracenteses (last LVP several days prior to transfer).  Most recent paracentesis did not meet criteria for SBP, and had no PMNs, but pt remained on Rocephin for leukocytosis of unclear source.  She has been encephalopathic but slowly improving with increased doses of Lactulose and Rifaxamin.  Pt has not improved with current treatment, thus requesting liver transplant evaluation, Case d/w Dr. Posadas, patient transferred to [facility]. MELD 35 upon presentation at [facility]. Last ETOH drink on 6/3     Upon presentation at [facility], patient was afebrile with systolic blood pressures in the 90s satting 95% on room air.  She was mildly encephalopathic, however was able to participate in conversation,  at bedside.  Meld 35 on presentation.  Labs notable for total bilirubin of 28, sodium of 118 (patient has been getting in diuretics at the outside hospital), INR of 2.4.  WBC of 23, platelets 54.       Upon further questioning regarding patient's disease process, she stated that she has had history of heavy alcohol use, most recently 1-2 pt of vodka per day.  Patient's last alcoholic drink was on 06/03/2019, approximately.  Patient did not state that she has been binge drinking, as noted above 2 pt of vodka per day, prior to her admission at outside hospital.  Patient also uses tobacco.  She stated that she was 1st diagnosed with alcoholic cirrhosis and alcoholic hepatitis in August 2018 and was advised to stop drinking at that time.  Patient has had multiple relapses since then.  It appears the patient has failed multiple alcohol rehabs in the past.  In the last couple of months patient has had recurrent problem with worsening ascites and abdominal distention.            Collateral:   ***    Past Psychiatric History:   Previous Psychiatric Hospitalizations: ***   Previous Medication Trials: ***  Previous Suicide Attempts: ***   History of Violence: ***   Outpatient psychiatrist: ***     Nerurological History:   Seizures: ***   Head trauma: ***     Social History:   Developmental: ***   Education: ***   Special Ed: ***   Employment Status/Info: ***   Financial: ***   Marital Status: ***   Children: ***   Housing Status: ***   history: ***  History of phys/sexual abuse: ***   Access to gun: ***   Mormonism: ***     Substance Abuse History:   Recreational Drugs:***   Use of Alcohol: ***   Tobacco Use:***   Rehab History:***     Legal History:   Past Charges/Incarcerations:***   Pending charges: ***     Family Psychiatric History:   ***

## 2019-07-04 NOTE — PROGRESS NOTES
Progress Note  Hospital Medicine  [facility], [place]       Patient Name: Destinee Gallagher  MRN:  [id number]  Hospital Medicine Team: [facility] L Darrick Knapp MD  Date of Admission:  6/26/2019     Length of Stay:  LOS: 7 days   Expected Discharge Date: 7/5/2019  Principal Problem:  Acute liver failure without hepatic coma     Subjective:     Interval History/Overnight Events:    - patient seems more pleasant today and talkative; Cr back to baseline, however T. Bili continues to rise, working up infection with cultures, given a dose of rocephin, went for IR para, however no fluid for tap    - patient was discussed at liver transplant meeting today, patient has been deferred until psych evaluates if patient needs medication for her bipolar disorder; patient and  made aware of this;      folic acid  1 mg Oral Daily    ivermectin  200 mcg/kg Oral Daily    lactulose  20 g Oral TID    lidocaine  2 patch Transdermal Q24H    midodrine  15 mg Oral TID    nicotine  1 patch Transdermal Daily    pantoprazole  40 mg Oral BID AC    rifAXImin  550 mg Oral BID    sodium bicarbonate  1,300 mg Oral BID    Tdap vaccine  0.5 mL Intramuscular Once    thiamine  100 mg Oral Daily           acetaminophen, Dextrose 10% Bolus, Dextrose 10% Bolus, glucagon (human recombinant), glucose, glucose, hepatitis B, hydrOXYzine pamoate, polyethylene glycol, promethazine, ramelteon, sodium chloride 0.9%, traMADol    Review of Systems   Constitutional: Negative for chills, fatigue, fever.   HENT: Negative for sore throat, trouble swallowing.    Eyes: Negative for photophobia, visual disturbance.   Respiratory: Negative for cough, shortness of breath.    Cardiovascular: Negative for chest pain, palpitations, leg swelling.   Gastrointestinal: Negative for abdominal pain, constipation, diarrhea, nausea, vomiting.   Endocrine: Negative for cold intolerance, heat intolerance.   Genitourinary: Negative for dysuria,  frequency.   Musculoskeletal: Negative for arthralgias, myalgias.   Skin: Negative for rash, wound, erythema   Neurological: Negative for dizziness, syncope, weakness, light-headedness.   Psychiatric/Behavioral: Negative for confusion, hallucinations, anxiety  All other systems reviewed and are negative.    Objective:     Temp:  [97.5 °F (36.4 °C)-98.4 °F (36.9 °C)]   Pulse:  [78-97]   Resp:  [16-18]   BP: (100-124)/(57-70)   SpO2:  [92 %-97 %]       Physical Exam:  Constitutional: appears older than stated age, chronically ill looking,  non-distressed, not diaphoretic.   HENT: NC/AT, external ears normal, oropharynx clear, MMM w/o exudates.   Eyes: PERRL, EOMI, conjunctiva normal, no discharge b/l, +scleral icterus   Neck: normal ROM, supple  CV: RRR, no m/r/g, no carotid bruits, +2 peripheral pulses.  Pulmonary/Chest wall: Breathing comfortably w/o distress, CTAB, no w/r/r, no crackles.  Decreased breath sounds at lung bases  GI: Soft, non-tender, (+) BS, (+) BM   +distended, tender palpation    Musculoskeletal: Normal ROM, no atrophy,  +no pitting edema in LE bilaterally   Neurological: AAO x 4, CN II-XI in tact, nl sensation, nl strength/tone    No asterixis   Skin: warm, dry   + jaundice, + significant spider angiomas, +bruising   Psych: normal mood and affect, normal behavior, thought content and judgement.    Labs:    Chemistries:   Recent Labs   Lab 07/01/19  0526 07/02/19  0445 07/03/19  0342   * 135* 135*   K 3.9 3.5 3.8   CL 98 100 101   CO2 20* 20* 22*   BUN 59* 52* 41*   CREATININE 2.1* 1.6* 1.3   CALCIUM 10.1 10.1 10.1   PROT 6.3 6.4 6.3   BILITOT 29.1* 31.4* 34.4*   ALKPHOS 225* 214* 207*   ALT 10 8* 7*   AST 81* 66* 54*   MG 2.6 2.6 2.4   PHOS 4.4 3.6 2.9        WBC:   Recent Labs   Lab 06/29/19  1532 06/30/19  0621 07/01/19  0526 07/02/19  0446 07/03/19  0343   WBC 20.65* 17.64* 14.56* 16.47* 17.93*     Bands:     CBC/Anemia Labs: Coags:    Recent Labs   Lab 07/01/19  0526 07/02/19  0446  07/03/19  0343   WBC 14.56* 16.47* 17.93*   HGB 7.2* 8.5* 8.5*   HCT 19.8* 24.6* 23.8*   PLT 57* 58* 70*   MCV 97 99* 97   RDW 16.3* 18.4* 18.3*    Recent Labs   Lab 07/01/19  0526 07/02/19  0445 07/03/19  0343   INR 2.4* 2.6* 2.4*        POCT Glucose: HbA1c:    Recent Labs   Lab 07/01/19  2211 07/02/19  1335 07/02/19  2146 07/03/19  0721 07/03/19  1126 07/03/19  2100   POCTGLUCOSE 111* 114* 96 129* 140* 108    No results found for: HGBA1C       Assessment and Plan     Hospital Course:    Ms. Destinee Gallagher is a 38 y.o. female who presented to [facility] on 6/26/2019 with ETOH hepatitis and acute liver failure , associated with hepatic encephalopathy, Severe hyponatremia with sodium level of 117, ascites, coagulopathy and thrombocytopenia, as well as development of upper GI bleed on 06/27/2019, and  ZULAY on 06/27/2019    Active Hospital Problems    Diagnosis  POA    *Acute liver failure without hepatic coma [K72.00]  Yes    Organ transplant candidate [Z76.82]  Not Applicable    Pre-transplant evaluation for liver transplant [Z01.818]  Not Applicable    Infection due to strongyloides [B78.9]  Yes    Coffee ground emesis [K92.0]  Yes    Upper GI bleed [K92.2]  No    Acute blood loss anemia [D62]  No    Hepatorenal syndrome [K76.7]  No    Acute alcoholic hepatitis [K70.10]  Yes    Ascites due to alcoholic hepatitis [K70.11]  Yes     Has had hx of SBP , as per family       Hepatic encephalopathy [K72.90]  Yes    Coagulopathy [D68.9]  Yes    Hyponatremia [E87.1]  Yes    Alcohol use disorder, severe, dependence [F10.20]  Yes    Macrocytic anemia [D53.9]  Yes    Leukocytosis [D72.829]  Yes    Hypotension [I95.9]  Yes    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     Acute liver failure without hepatic coma  Acute alcoholic hepatitis with ascites  Alcoholic cirrhosis of liver with ascites     MELD-Na score: 33 at 7/3/2019  3:43 AM  MELD score: 32 at 7/3/2019  3:43 AM  Calculated from:  Serum Creatinine: 1.3 mg/dL at 7/3/2019  3:42 AM  Serum Sodium: 135 mmol/L at 7/3/2019  3:42 AM  Total Bilirubin: 34.4 mg/dL at 7/3/2019  3:42 AM  INR(ratio): 2.4 at 7/3/2019  3:43 AM  Age: 38 years     - decompensation due to recent alcohol binge drinking.  Meld score of 35 on admission  - hepatology consulted.  Obtaining financial approval for initiation of inpatient liver transplant evaluation  - [facility]  - viral and autoimmune markers negative  - LVP on 06/26/2019,  3.4 L drained.  No SBP found on ascitic fluid.  Needs at least p.o. ciprofloxacin prophylaxis given history of SBP in the past  - treat hypotension with midodrine.   - p.o. Lactulose and PO rifaximin for hepatic encephalopathy  - IV vitamin K for coagulopathy  - Psychiatry consulted on the case. deemed patient high risk, needs ETOH rehab  - serology for liver transplant evaluation -ordered   - SBP ppx with PO cipro as patient has had hx of SBP  - patient with decompensation on 06/27/2019 and some of the pre liver transplant testing and imaging was deferred that day. Restarted on 6/28. Needs GYN, ID, mammo, LTS, , ID + strongyloides and will need therapy   - echo stress negative for ischemia  - patient presented at liver transplant meeting today and is deferred until psych evaluates patient for bipolar disorder and if treatment is indicated     Upper GI bleed  Acute blood loss anemia  - patient with coffee-ground emesis on 06/27/2019 with hemoglobin decreased from 10-8.8.  May be due to portal hypertensive gastropathy, however given history of cirrhosis, there is definitely concern for variceal bleeding  - patient started on IV ceftriaxone for SBP prophylaxis in lieu of GI bleeding  - started on IV  octreotide drip and  IV PPI b.i.d. On 6/27. Planned  for EGD on 06/28/2019 but procedure was deferred due to high chance of decompensation  - will plan to transfuse for hemoglobin likely <8  - 1 unit of FFP transfusion on 06/27/2019 given significant coagulopathy with INR greater than 2 and active bleeding  - IV oct gtt and IV ppi stopped on 6/28 , IV ceftriaxone de-escalated to Cipro prophylaxis.  Plan to monitor for further bleeding and reassess for necessity for EGD  - 1 u prbcs given on 7/1 for decreasing hbg down to 7.2.  patient may require an EGD       Hepatorenal syndrome  - creatinine increasing to 2.1 on 06/27/2019.  Creatinine on admission 1.3, patient was receiving diuretics at outside hospital.  Urine sodium is less than 20, with ascites and hypotension.  Which is highly highly concerning for hepatorenal syndrome  - continue max dose midodrine 15 t.i.d.  - patient has been on subcu octreotide and transition to octreotide drip for upper GI bleeding  - ICU consulted on 06/27/2019 given tenuous blood pressures, worsening encephalopathy, worsening renal failure, significant hyponatremia,  upper GI bleeding.  Patient will need close monitoring and possibly transfer for pressors to the ICU, when deemed appropriate by the ICU team  - cont midodrine, IV octreotide and albumin.   - 6/29 - Nephrology consulted. ICU consulted for IV pressors in setting of HRS and low BPs. Nephrology recommended current regimen and no IV pressors at this time   - IV albumin stopped 7/1 due to Cr down to 1.3 and normal serum albumin     Acute on Chronic hepatic encephalopathy  - PO lactulose  - PO rifaximin  - monitor BMs, goal of 3-4 . Monitor mental status      Alcoh ol use disorder, severe, dependence  - with history of alcoholic cirrhosis and alcoholic hepatitis leading to acute liver failure  - urine toxicology ordered  - [facility] 107  - Psychiatry consulted on the case- deemed patient high risk, needs ETOH rehab  - at one point,  Patient explicitly stated that she does not need Alcohol Rehab . She stated that her current problem is due to alcohol binge earlier in May due to problems in personal life and associated anxiety with that.  As per , patient has underwent multiple rehabs in the past and relapsed.  The longest patient could ever go without relapsing was 1 year in early 2000s     Hyponatremia  - sodium of 118 on admission, patient has been getting diuretics at outside hospital.  Likely hypervolemic hyponatremia in setting of decompensated cirrhosis and volume overload  - hold diuretics at this time  - given hypotension hyponatremia and relatively normal potassium, AM cortisol level checked and noted to be at 7.8.   - cosyntropin stim test to evaluated for adrenal insufficiency - test was not optimally performed and showed unlikely adrenal insufficiency   - fluid restriction to 1 L per day     Hypotension  - likely due to liver failure and decompensated hepatic cirrhosis  - patient started on midodrine 5 mg t.i.d.--> increased to 15 t.i.d. given very tenuous blood pressures  -very low threshold for  transfer to ICU for IV pressors     Leukocytosis  - WBC count of 23 on admission.  Very likely due to alcoholic hepatitis.  WBC of 21 down trending from 20/6 at outside hospital  - no SBP and cx remains ngtd  - antibiotics as above  - continue monitor     Anasarca  - likely due to liver failure and decompensated hepatic cirrhosis  - 2D echo with 65 % and Estimated PA pressure is at least 36 mmHg.     Macrocytic Anemia  Thrombocytopenia  Coagulopathy  - hemoglobin of 10 with MCV of 100 on admission.  - platelets of 54 on admission.  - INR of 2.4 on admission likely due to liver failure  - administer IV vitamin K x3 days for coagulopathy-- INR remains 2.4 despite vitamin K  - monitor platelets and hemoglobin daily  - DIC and hemolysis studies negative  - 1 u prbcs given on 7/1 for decreasing hbg down to 7.2.   - patient may require an  EGD     Tobacco use disorder  - patient is a current every day smoker  - will assess for necessity of nicotine patch    # Bipolar Disorder  - psych eval for evaluation of bipolar disorder and need for treatment      Diet:  Low Na, fluid restriction   GI PPx:  ppi  DVT PPx:  scds  Goals of Care:  full      High Risk Conditions:  Liver failure     Disposition:       - patient has been presented for liver transplant and has been deferred, they would like psych evaluation for diagnosis of bipolar d/o and h/o 2 SI attempts in the past;

## 2019-07-05 ENCOUNTER — COMMITTEE REVIEW (OUTPATIENT)
Dept: TRANSPLANT | Facility: CLINIC | Age: 39
End: 2019-07-05

## 2019-07-05 VITALS
WEIGHT: 133.63 LBS | TEMPERATURE: 99 F | HEIGHT: 64 IN | OXYGEN SATURATION: 94 % | BODY MASS INDEX: 22.81 KG/M2 | RESPIRATION RATE: 17 BRPM | HEART RATE: 88 BPM | DIASTOLIC BLOOD PRESSURE: 57 MMHG | SYSTOLIC BLOOD PRESSURE: 102 MMHG

## 2019-07-05 LAB
ALBUMIN SERPL BCP-MCNC: 3.4 G/DL (ref 3.5–5.2)
ALP SERPL-CCNC: 192 U/L (ref 55–135)
ALT SERPL W/O P-5'-P-CCNC: 7 U/L (ref 10–44)
ANION GAP SERPL CALC-SCNC: 12 MMOL/L (ref 8–16)
AST SERPL-CCNC: 42 U/L (ref 10–40)
BASOPHILS # BLD AUTO: 0.09 K/UL (ref 0–0.2)
BASOPHILS NFR BLD: 0.5 % (ref 0–1.9)
BILIRUB SERPL-MCNC: 37.4 MG/DL (ref 0.1–1)
BUN SERPL-MCNC: 24 MG/DL (ref 6–20)
CALCIUM SERPL-MCNC: 9.5 MG/DL (ref 8.7–10.5)
CHLORIDE SERPL-SCNC: 99 MMOL/L (ref 95–110)
CO2 SERPL-SCNC: 23 MMOL/L (ref 23–29)
CREAT SERPL-MCNC: 1.2 MG/DL (ref 0.5–1.4)
DIFFERENTIAL METHOD: ABNORMAL
EOSINOPHIL # BLD AUTO: 0.4 K/UL (ref 0–0.5)
EOSINOPHIL NFR BLD: 1.9 % (ref 0–8)
ERYTHROCYTE [DISTWIDTH] IN BLOOD BY AUTOMATED COUNT: 18.1 % (ref 11.5–14.5)
EST. GFR  (AFRICAN AMERICAN): >60 ML/MIN/1.73 M^2
EST. GFR  (NON AFRICAN AMERICAN): 57.5 ML/MIN/1.73 M^2
GLUCOSE SERPL-MCNC: 105 MG/DL (ref 70–110)
HCT VFR BLD AUTO: 22.9 % (ref 37–48.5)
HGB BLD-MCNC: 7.9 G/DL (ref 12–16)
IMM GRANULOCYTES # BLD AUTO: 0.16 K/UL (ref 0–0.04)
IMM GRANULOCYTES NFR BLD AUTO: 0.8 % (ref 0–0.5)
INR PPP: 2.7 (ref 0.8–1.2)
LYMPHOCYTES # BLD AUTO: 1.1 K/UL (ref 1–4.8)
LYMPHOCYTES NFR BLD: 5.7 % (ref 18–48)
M TB IFN-G CD4+ BCKGRND COR BLD-ACNC: 0.01 IU/ML
MAGNESIUM SERPL-MCNC: 2 MG/DL (ref 1.6–2.6)
MCH RBC QN AUTO: 33.5 PG (ref 27–31)
MCHC RBC AUTO-ENTMCNC: 34.5 G/DL (ref 32–36)
MCV RBC AUTO: 97 FL (ref 82–98)
MITOGEN IGNF BCKGRD COR BLD-ACNC: 1.55 IU/ML
MITOGEN IGNF BCKGRD COR BLD-ACNC: NEGATIVE [IU]/ML
MONOCYTES # BLD AUTO: 0.9 K/UL (ref 0.3–1)
MONOCYTES NFR BLD: 4.6 % (ref 4–15)
NEUTROPHILS # BLD AUTO: 16.3 K/UL (ref 1.8–7.7)
NEUTROPHILS NFR BLD: 86.5 % (ref 38–73)
NIL: 0.03 IU/ML
NRBC BLD-RTO: 0 /100 WBC
PHOSPHATE SERPL-MCNC: 2.9 MG/DL (ref 2.7–4.5)
PLATELET # BLD AUTO: 68 K/UL (ref 150–350)
PMV BLD AUTO: 13 FL (ref 9.2–12.9)
POTASSIUM SERPL-SCNC: 3.4 MMOL/L (ref 3.5–5.1)
PROT SERPL-MCNC: 5.9 G/DL (ref 6–8.4)
PROTHROMBIN TIME: 25.7 SEC (ref 9–12.5)
RBC # BLD AUTO: 2.36 M/UL (ref 4–5.4)
SODIUM SERPL-SCNC: 134 MMOL/L (ref 136–145)
TB2 - NIL: 0.01 IU/ML
WBC # BLD AUTO: 18.84 K/UL (ref 3.9–12.7)

## 2019-07-05 PROCEDURE — S4991 NICOTINE PATCH NONLEGEND: HCPCS | Performed by: HOSPITALIST

## 2019-07-05 PROCEDURE — 36415 COLL VENOUS BLD VENIPUNCTURE: CPT

## 2019-07-05 PROCEDURE — 84100 ASSAY OF PHOSPHORUS: CPT

## 2019-07-05 PROCEDURE — 85025 COMPLETE CBC W/AUTO DIFF WBC: CPT

## 2019-07-05 PROCEDURE — 99233 SBSQ HOSP IP/OBS HIGH 50: CPT | Mod: ,,, | Performed by: INTERNAL MEDICINE

## 2019-07-05 PROCEDURE — 85610 PROTHROMBIN TIME: CPT

## 2019-07-05 PROCEDURE — 99239 PR HOSPITAL DISCHARGE DAY,>30 MIN: ICD-10-PCS | Mod: ,,, | Performed by: HOSPITALIST

## 2019-07-05 PROCEDURE — 25000003 PHARM REV CODE 250: Performed by: HOSPITALIST

## 2019-07-05 PROCEDURE — 83735 ASSAY OF MAGNESIUM: CPT

## 2019-07-05 PROCEDURE — 99233 PR SUBSEQUENT HOSPITAL CARE,LEVL III: ICD-10-PCS | Mod: ,,, | Performed by: INTERNAL MEDICINE

## 2019-07-05 PROCEDURE — 94761 N-INVAS EAR/PLS OXIMETRY MLT: CPT

## 2019-07-05 PROCEDURE — 99239 HOSP IP/OBS DSCHRG MGMT >30: CPT | Mod: ,,, | Performed by: HOSPITALIST

## 2019-07-05 PROCEDURE — 80053 COMPREHEN METABOLIC PANEL: CPT

## 2019-07-05 RX ORDER — LACTULOSE 10 G/15ML
20 SOLUTION ORAL; RECTAL 3 TIMES DAILY
Qty: 2700 ML | Refills: 3 | Status: SHIPPED | OUTPATIENT
Start: 2019-07-05 | End: 2019-08-04

## 2019-07-05 RX ORDER — TRAMADOL HYDROCHLORIDE 50 MG/1
50 TABLET ORAL EVERY 6 HOURS PRN
Qty: 25 TABLET | Refills: 0 | Status: SHIPPED | OUTPATIENT
Start: 2019-07-05

## 2019-07-05 RX ORDER — POTASSIUM CHLORIDE 20 MEQ/1
40 TABLET, EXTENDED RELEASE ORAL ONCE
Status: COMPLETED | OUTPATIENT
Start: 2019-07-05 | End: 2019-07-05

## 2019-07-05 RX ORDER — CIPROFLOXACIN 500 MG/1
500 TABLET ORAL DAILY
Qty: 30 TABLET | Refills: 1 | Status: SHIPPED | OUTPATIENT
Start: 2019-07-05

## 2019-07-05 RX ORDER — MIDODRINE HYDROCHLORIDE 5 MG/1
15 TABLET ORAL 3 TIMES DAILY
Qty: 270 TABLET | Refills: 11 | Status: SHIPPED | OUTPATIENT
Start: 2019-07-05 | End: 2020-07-04

## 2019-07-05 RX ORDER — PANTOPRAZOLE SODIUM 40 MG/1
40 TABLET, DELAYED RELEASE ORAL
Qty: 60 TABLET | Refills: 0 | Status: SHIPPED | OUTPATIENT
Start: 2019-07-05 | End: 2020-07-04

## 2019-07-05 RX ORDER — FOLIC ACID 1 MG/1
1 TABLET ORAL DAILY
Qty: 30 TABLET | Refills: 1 | Status: SHIPPED | OUTPATIENT
Start: 2019-07-06 | End: 2019-08-05

## 2019-07-05 RX ORDER — PROMETHAZINE HYDROCHLORIDE 12.5 MG/1
12.5 TABLET ORAL EVERY 6 HOURS PRN
Qty: 30 TABLET | Refills: 1 | Status: SHIPPED | OUTPATIENT
Start: 2019-07-05

## 2019-07-05 RX ORDER — RAMELTEON 8 MG/1
8 TABLET ORAL NIGHTLY PRN
Qty: 30 TABLET | Refills: 1 | Status: SHIPPED | OUTPATIENT
Start: 2019-07-05

## 2019-07-05 RX ORDER — LANOLIN ALCOHOL/MO/W.PET/CERES
100 CREAM (GRAM) TOPICAL DAILY
COMMUNITY
Start: 2019-07-06

## 2019-07-05 RX ORDER — HYDROXYZINE PAMOATE 25 MG/1
25 CAPSULE ORAL EVERY 6 HOURS PRN
Qty: 35 CAPSULE | Refills: 2 | Status: SHIPPED | OUTPATIENT
Start: 2019-07-05

## 2019-07-05 RX ADMIN — PROMETHAZINE HYDROCHLORIDE 12.5 MG: 12.5 TABLET ORAL at 02:07

## 2019-07-05 RX ADMIN — NICOTINE 1 PATCH: 21 PATCH, EXTENDED RELEASE TRANSDERMAL at 02:07

## 2019-07-05 RX ADMIN — CIPROFLOXACIN HYDROCHLORIDE 500 MG: 500 TABLET, FILM COATED ORAL at 02:07

## 2019-07-05 RX ADMIN — LACTULOSE 20 G: 20 SOLUTION ORAL at 02:07

## 2019-07-05 RX ADMIN — POTASSIUM CHLORIDE 40 MEQ: 1500 TABLET, EXTENDED RELEASE ORAL at 02:07

## 2019-07-05 RX ADMIN — RIFAXIMIN 550 MG: 550 TABLET ORAL at 02:07

## 2019-07-05 RX ADMIN — Medication 100 MG: at 02:07

## 2019-07-05 RX ADMIN — MIDODRINE HYDROCHLORIDE 15 MG: 5 TABLET ORAL at 02:07

## 2019-07-05 RX ADMIN — PANTOPRAZOLE SODIUM 40 MG: 40 TABLET, DELAYED RELEASE ORAL at 02:07

## 2019-07-05 RX ADMIN — FOLIC ACID 1 MG: 1 TABLET ORAL at 02:07

## 2019-07-05 RX ADMIN — HYDROXYZINE PAMOATE 25 MG: 25 CAPSULE ORAL at 02:07

## 2019-07-05 NOTE — COMMITTEE REVIEW
Destinee Gallagher's case presented to selection committee.  Patient has been declined for liver transplant due to concern for high risk of alcohol recidivism.   I was present at the committee meeting and attest to the decision of the committee.    Edward Mustafa  07/05/2019

## 2019-07-05 NOTE — ASSESSMENT & PLAN NOTE
Patient is a 38-year-old female with alcoholic cirrhosis with decompensation.  Meld score is 34 today.  The patient was presented at committee meeting today, she was declined for liver transplant due to concern for high risk of alcohol recidivism.     Continue lactulose and rifaximin for hepatic encephalopathy.  Continue supportive care, thiamine, folate, multivitamin supplementation  daily CBC, CMP, INR

## 2019-07-05 NOTE — SUBJECTIVE & OBJECTIVE
Interval History:  Patient appears well today no acute complaints.    Current Facility-Administered Medications   Medication    acetaminophen tablet 325 mg    ciprofloxacin HCl tablet 500 mg    dextrose 10% (D10W) Bolus    dextrose 10% (D10W) Bolus    folic acid tablet 1 mg    glucagon (human recombinant) injection 1 mg    glucose chewable tablet 16 g    glucose chewable tablet 24 g    hepatitis B (HEPLISAV-B) 20 mcg/0.5 mL vaccine 0.5 mL    hydrOXYzine pamoate capsule 25 mg    lactulose 20 gram/30 mL solution Soln 20 g    lidocaine 5 % patch 2 patch    midodrine tablet 15 mg    nicotine 21 mg/24 hr 1 patch    pantoprazole EC tablet 40 mg    polyethylene glycol packet 17 g    promethazine tablet 12.5 mg    ramelteon tablet 8 mg    rifAXIMin tablet 550 mg    sodium chloride 0.9% flush 5 mL    thiamine tablet 100 mg    traMADol tablet 50 mg       Objective:     Vital Signs (Most Recent):  Temp: 98.8 °F (37.1 °C) (07/05/19 1214)  Pulse: 90 (07/05/19 1214)  Resp: 17 (07/05/19 1015)  BP: (!) 112/56 (07/05/19 1214)  SpO2: (!) 92 % (07/05/19 1214) Vital Signs (24h Range):  Temp:  [98.6 °F (37 °C)-98.9 °F (37.2 °C)] 98.8 °F (37.1 °C)  Pulse:  [87-99] 90  Resp:  [17-19] 17  SpO2:  [92 %-98 %] 92 %  BP: ()/(54-67) 112/56     Weight: 60.6 kg (133 lb 9.6 oz) (07/04/19 0600)  Body mass index is 22.93 kg/m².    Physical Exam   Constitutional: She is oriented to person, place, and time. She appears well-developed and well-nourished. No distress.   HENT:   Head: Normocephalic.   Eyes: Conjunctivae are normal. Scleral icterus is present.   Neck: Normal range of motion. Neck supple.   Cardiovascular: Normal rate and regular rhythm.   Pulmonary/Chest: Effort normal and breath sounds normal.   Abdominal: Soft. Bowel sounds are normal. She exhibits distension. She exhibits no mass. There is no tenderness. There is no rebound and no guarding.   Musculoskeletal: Normal range of motion.   Neurological: She is  alert and oriented to person, place, and time.   Skin: Skin is warm and dry.   Psychiatric: She has a normal mood and affect.       MELD-Na score: 34 at 7/5/2019  4:31 AM  MELD score: 33 at 7/5/2019  4:31 AM  Calculated from:  Serum Creatinine: 1.2 mg/dL at 7/5/2019  4:31 AM  Serum Sodium: 134 mmol/L at 7/5/2019  4:31 AM  Total Bilirubin: 37.4 mg/dL at 7/5/2019  4:31 AM  INR(ratio): 2.7 at 7/5/2019  4:31 AM  Age: 38 years    Significant Labs:  CBC:   Recent Labs   Lab 07/05/19 0431   WBC 18.84*   RBC 2.36*   HGB 7.9*   HCT 22.9*   PLT 68*     BMP:   Recent Labs   Lab 07/05/19 0431      *   K 3.4*   CL 99   CO2 23   BUN 24*   CREATININE 1.2   CALCIUM 9.5     CMP:   Recent Labs   Lab 07/05/19 0431      CALCIUM 9.5   ALBUMIN 3.4*   PROT 5.9*   *   K 3.4*   CO2 23   CL 99   BUN 24*   CREATININE 1.2   ALKPHOS 192*   ALT 7*   AST 42*   BILITOT 37.4*     Coagulation:   Recent Labs   Lab 07/05/19 0431   INR 2.7*       Significant Imaging:  Labs: Reviewed

## 2019-07-05 NOTE — PLAN OF CARE
PLAN IS TO PRESENT PT TO LIVER TRANSPLANT      07/05/19 6628   Discharge Reassessment   Assessment Type Discharge Planning Reassessment   Provided patient/caregiver education on the expected discharge date and the discharge plan No   Do you have any problems affording any of your prescribed medications? No

## 2019-07-05 NOTE — PLAN OF CARE
Problem: Adult Inpatient Plan of Care  Goal: Plan of Care Review  Outcome: Ongoing (interventions implemented as appropriate)  Pt AAOx4, VSS, afebrile. No acute changes overnight. CBG AC/HS. Neurochecks q4h. Pt scheduled to go to mammogram today. Vistaril PRN x1 given for anxiety. Frequent rounding maintained, call bell within reach. WCTM.

## 2019-07-05 NOTE — PROGRESS NOTES
"[facility]  Hepatology  Progress Note    Patient Name: Destinee Gallagher  MRN: [id number]  Admission Date: 6/26/2019  Hospital Length of Stay: 9 days  Attending Provider: Darrick Knapp MD   Primary Care Physician: Harsha Larson  Principal Problem:Acute liver failure without hepatic coma    Subjective:     Transplant status: No    HPI: 38 year old female with a history of alcoholic cirrhosis on who hepatology is being consulted for decompensated cirrhosis.    Per HPI:  "Presented to [facility] [place] [place] on 06/26/2019 as a transfer from  [facility] in [place], [place], for liver transplantation evaluation as well as hepatology evaluation. Patient was admitted to OSH 6/6 with abdominal pain, nausea, decreased oral intake, and visibly jaundice.  Over past 2.5 weeks there , pt has been diagnosed with and treated for SBP.  She has been continued on diuretics, but ascites has been resistant to diuretics , requiring multiple paracenteses (last LVP several days prior to transfer).  Most recent paracentesis did not meet criteria for SBP, and had no PMNs, but pt remained on Rocephin for leukocytosis of unclear source.  She has been encephalopathic but slowly improving with increased doses of Lactulose and Rifaxamin.  Pt has not improved with current treatment, thus requesting liver transplant evaluation, Case d/w Dr. Posadas, patient transferred to [facility] [place] [place]. MELD 35 upon presentation at [facility] [place] [place]. Last ETOH drink on 6/3     Upon presentation at [facility] [place] [place], patient was afebrile with systolic blood pressures in the 90s satting 95% on room air.  She was mildly encephalopathic, however was able to participate in conversation,  at bedside.  Meld 35 on presentation.  Labs notable for total bilirubin of 28, sodium of 118 (patient has been getting in diuretics at the outside hospital), INR of 2.4.  WBC of 23, platelets 54.      Upon further questioning regarding patient's disease process, she stated that she has had history of " "heavy alcohol use, most recently 1-2 pt of vodka per day.  Patient's last alcoholic drink was on 06/03/2019, approximately.  Patient did not state that she has been binge drinking, as noted above 2 pt of vodka per day, prior to her admission at outside hospital.  Patient also uses tobacco.  She stated that she was 1st diagnosed with alcoholic cirrhosis and alcoholic hepatitis in August 2018 and was advised to stop drinking at that time.  Patient has had multiple relapses since then.  It appears the patient has failed multiple alcohol rehabs in the past.  In the last couple of months patient has had recurrent problem with worsening ascites and abdominal distention."    Interval History:  Patient seen with  at bedside. Reports he last drink was on 6/1.  She has been to rehab multiple times but has always relapse.  Feels like this time will be different as she will follow instructions and has 2 kids to live for.    Interval History:  Patient appears well today no acute complaints.    Current Facility-Administered Medications   Medication    acetaminophen tablet 325 mg    ciprofloxacin HCl tablet 500 mg    dextrose 10% (D10W) Bolus    dextrose 10% (D10W) Bolus    folic acid tablet 1 mg    glucagon (human recombinant) injection 1 mg    glucose chewable tablet 16 g    glucose chewable tablet 24 g    hepatitis B (HEPLISAV-B) 20 mcg/0.5 mL vaccine 0.5 mL    hydrOXYzine pamoate capsule 25 mg    lactulose 20 gram/30 mL solution Soln 20 g    lidocaine 5 % patch 2 patch    midodrine tablet 15 mg    nicotine 21 mg/24 hr 1 patch    pantoprazole EC tablet 40 mg    polyethylene glycol packet 17 g    promethazine tablet 12.5 mg    ramelteon tablet 8 mg    rifAXIMin tablet 550 mg    sodium chloride 0.9% flush 5 mL    thiamine tablet 100 mg    traMADol tablet 50 mg       Objective:     Vital Signs (Most Recent):  Temp: 98.8 °F (37.1 °C) (07/05/19 1214)  Pulse: 90 (07/05/19 1214)  Resp: 17 (07/05/19 " 1015)  BP: (!) 112/56 (07/05/19 1214)  SpO2: (!) 92 % (07/05/19 1214) Vital Signs (24h Range):  Temp:  [98.6 °F (37 °C)-98.9 °F (37.2 °C)] 98.8 °F (37.1 °C)  Pulse:  [87-99] 90  Resp:  [17-19] 17  SpO2:  [92 %-98 %] 92 %  BP: ()/(54-67) 112/56     Weight: 60.6 kg (133 lb 9.6 oz) (07/04/19 0600)  Body mass index is 22.93 kg/m².    Physical Exam   Constitutional: She is oriented to person, place, and time. She appears well-developed and well-nourished. No distress.   HENT:   Head: Normocephalic.   Eyes: Conjunctivae are normal. Scleral icterus is present.   Neck: Normal range of motion. Neck supple.   Cardiovascular: Normal rate and regular rhythm.   Pulmonary/Chest: Effort normal and breath sounds normal.   Abdominal: Soft. Bowel sounds are normal. She exhibits distension. She exhibits no mass. There is no tenderness. There is no rebound and no guarding.   Musculoskeletal: Normal range of motion.   Neurological: She is alert and oriented to person, place, and time.   Skin: Skin is warm and dry.   Psychiatric: She has a normal mood and affect.       MELD-Na score: 34 at 7/5/2019  4:31 AM  MELD score: 33 at 7/5/2019  4:31 AM  Calculated from:  Serum Creatinine: 1.2 mg/dL at 7/5/2019  4:31 AM  Serum Sodium: 134 mmol/L at 7/5/2019  4:31 AM  Total Bilirubin: 37.4 mg/dL at 7/5/2019  4:31 AM  INR(ratio): 2.7 at 7/5/2019  4:31 AM  Age: 38 years    Significant Labs:  CBC:   Recent Labs   Lab 07/05/19 0431   WBC 18.84*   RBC 2.36*   HGB 7.9*   HCT 22.9*   PLT 68*     BMP:   Recent Labs   Lab 07/05/19 0431      *   K 3.4*   CL 99   CO2 23   BUN 24*   CREATININE 1.2   CALCIUM 9.5     CMP:   Recent Labs   Lab 07/05/19 0431      CALCIUM 9.5   ALBUMIN 3.4*   PROT 5.9*   *   K 3.4*   CO2 23   CL 99   BUN 24*   CREATININE 1.2   ALKPHOS 192*   ALT 7*   AST 42*   BILITOT 37.4*     Coagulation:   Recent Labs   Lab 07/05/19 0431   INR 2.7*       Significant Imaging:  Labs: Reviewed    Assessment/Plan:      Alcoholic cirrhosis of liver with ascites  Patient is a 38-year-old female with alcoholic cirrhosis with decompensation.  Meld score is 34 today.  The patient was presented at committee meeting today, she was declined for liver transplant due to concern for high risk of alcohol recidivism.    These findings were explained to the patient .  We recommended strict cessation of alcohol and encouraged adequate nutrition to help with liver recovery. We also explained that they can seek additional opinions from other centers as well.       Continue lactulose and rifaximin for hepatic encephalopathy.  Continue supportive care, thiamine, folate, multivitamin supplementation  daily CBC, CMP, INR        Thank you for your consult.    Maegan Cardenas MD  Hepatology  [facility]

## 2019-07-05 NOTE — PLAN OF CARE
Consult Note  Liver Transplant Surgery    Consult Requested By: Angela Li  Reason for Consult: Liver transplant evaluation    SUBJECTIVE:     History of Present Illness: Patient is a 38 y.o. female with liver disease due to alcoholic liver disease and fulminant hepatic failure.  Symptoms and complications of liver disease include ascites (recurrent paracentesis needed), edema, encephalopathy, fatigue, muscle wasting, portal hypertension and thrombocytopenia.     Scheduled Meds:   ciprofloxacin HCl  500 mg Oral Daily    folic acid  1 mg Oral Daily    lactulose  20 g Oral TID    lidocaine  2 patch Transdermal Q24H    midodrine  15 mg Oral TID    nicotine  1 patch Transdermal Daily    pantoprazole  40 mg Oral BID AC    potassium chloride SA  40 mEq Oral Once    rifAXImin  550 mg Oral BID    thiamine  100 mg Oral Daily     Continuous Infusions:  PRN Meds:acetaminophen, Dextrose 10% Bolus, Dextrose 10% Bolus, glucagon (human recombinant), glucose, glucose, hepatitis B, hydrOXYzine pamoate, polyethylene glycol, promethazine, ramelteon, sodium chloride 0.9%, traMADol    Review of patient's allergies indicates:   Allergen Reactions    Zofran [ondansetron hcl (pf)]      headache       Past Medical History:   Diagnosis Date    Acute alcoholic hepatitis 2019    Acute liver failure without hepatic coma 2019    Alcohol use disorder, severe, dependence 2019    Coagulopathy 2019    Hepatic encephalopathy 2019    Hyponatremia 2019    Thrombocytopenia due to hypersplenism 2019     Past Surgical History:   Procedure Laterality Date     SECTION      CHOLECYSTECTOMY       History reviewed. No pertinent family history.  Social History     Tobacco Use    Smoking status: Current Every Day Smoker     Packs/day: 1.00     Years: 15.00     Pack years: 15.00     Types: Cigarettes   Substance Use Topics    Alcohol use: Not Currently     Comment: 19 last drink    Drug use:  Never        Review of Systems:      OBJECTIVE:     Vital Signs (Most Recent)  Temp: 98.6 °F (37 °C) (07/05/19 0751)  Pulse: 93 (07/05/19 1100)  Resp: 17 (07/05/19 1015)  BP: (!) 88/54 (07/05/19 0751)  SpO2: (!) 93 % (07/05/19 1015)    Vital Signs Range (Last 24H):  Temp:  [98.6 °F (37 °C)-98.9 °F (37.2 °C)]   Pulse:  [87-99]   Resp:  [17-19]   BP: ()/(54-80)   SpO2:  [92 %-98 %]     Physical Exam:  Jaundice, oriented x3.   Abdomen: Moderate ascites, lap ports scars from previous cohlecystectomy    Laboratory:  Labs within the past month have been reviewed.      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 year old female with alcoholic liver disease being evaluated for possible OLT.  In my opinion, she is a suitable liver transplant candidate.  She will be presented to selection committee after all tests and evaluations are complete.    UNOS Patient Status  Functional Status: 30% - Severely disabled: hospitalization is indicated, death not imminent  Physical Capacity: No Limitations    Counseling: I discussed with the patient the benefits of liver transplantation.  We discussed the evaluation and listing procedures.  We discussed the MELD system and the associated  waiting times.  We discussed national and center specific survival results.  We discussed the option of being multiply listed in different OPOs.   We discussed the risks, benefits and potential complications related to surgery including the risks related to anesthesia, bleeding, infection, primary non-function of the allograft, the risk of reoperation as well as the risk of death.  We discussed the typical post-operative course, length of hospitalization, the long-term use of immunosuppressive therapy as well as the need for long-term routine follow-up.    PHS: I discussed the use of organs from donors with PHS increased-risk behavior, including the testing protocols utilized, as well as data from the literature regarding the likelihood of transmission of hepatitis or HIV.  The patient is willing to consider such grafts.  DCD: I discussed the use of organs recovered by donation after cardiac death (DCD), including slightly decreased graft survival and greater risk of arterial and biliary complications. The potential advantage to the recipient is possibly receiving a transplant sooner by accepting such an organ. The patient is willing to consider such grafts.  HBcAb: I discussed the use of organs from donors with HBcAb in conjunction with long term use of HBV antiviral drugs, such as lamivudine. The small but measurable risk of hepatitis B seroconversion was discussed as well as the potentially life long need to continue antiviral drugs. The patient is willing to consider such grafts.  HCV Non-viremic recipient: I discussed the use of HCV-positive organs in naive recipients, including the risk of viral transmission to the patients or others, potential insurance barriers for antiviral medication coverage, risk for fibrosing cholestatic hepatitis, death or graft loss. The potential advantage to the recipient is the possibility of receiving a transplant sooner with decreased mortality risk by accepting such an organ.  The patient is willing to consider such grafts.  LDLT: I discussed the nature of living donor liver transplant, including donor risks and more frequent recipient complications. The patient is willing to consider such grafts.

## 2019-07-05 NOTE — PT/OT/SLP PROGRESS
Physical Therapy      Patient Name:  Destinee Gallagher   MRN:  [id number]    Pt not seen on this date; Chart reviewed and pt remain appropriate for PT services at this time.  Will see pt as schedule allows.      Clint Whitaker, PT,DPT  7/5/2019

## 2019-07-06 NOTE — PROGRESS NOTES
Progress Note  Hospital Medicine  [facility], [place]       Patient Name: Destinee Gallagher  MRN:  [id number]  Hospital Medicine Team: [facility] MED L Darrick Knapp MD  Date of Admission:  6/26/2019     Length of Stay:  LOS: 9 days   Expected Discharge Date: 7/5/2019  Principal Problem:  Acute liver failure without hepatic coma     Subjective:     Interval History/Overnight Events:    - patient was declined for liver transplant today and patient and  notified of decision; patient's LFTs are acutally improving and may actually recover and this was told to patient; Cr also at baseline;       Review of Systems   Constitutional: Negative for chills, fatigue, fever.   HENT: Negative for sore throat, trouble swallowing.    Eyes: Negative for photophobia, visual disturbance.   Respiratory: Negative for cough, shortness of breath.    Cardiovascular: Negative for chest pain, palpitations, leg swelling.   Gastrointestinal: Negative for abdominal pain, constipation, diarrhea, nausea, vomiting.   Endocrine: Negative for cold intolerance, heat intolerance.   Genitourinary: Negative for dysuria, frequency.   Musculoskeletal: Negative for arthralgias, myalgias.   Skin: Negative for rash, wound, erythema   Neurological: Negative for dizziness, syncope, weakness, light-headedness.   Psychiatric/Behavioral: Negative for confusion, hallucinations, anxiety  All other systems reviewed and are negative.    Objective:     Temp:  [99 °F (37.2 °C)]   Pulse:  [88-98]   BP: (102)/(57)   SpO2:  [94 %]       Physical Exam:  Constitutional: appears older than stated age, chronically ill looking,  non-distressed, not diaphoretic.   HENT: NC/AT, external ears normal, oropharynx clear, MMM w/o exudates.   Eyes: PERRL, EOMI, conjunctiva normal, no discharge b/l, +scleral icterus   Neck: normal ROM, supple  CV: RRR, no m/r/g, no carotid bruits, +2 peripheral pulses.  Pulmonary/Chest wall: Breathing comfortably w/o distress,  CTAB, no w/r/r, no crackles.  Decreased breath sounds at lung bases  GI: Soft, non-tender, (+) BS, (+) BM   +distended, tender palpation    Musculoskeletal: Normal ROM, no atrophy,  +no pitting edema in LE bilaterally   Neurological: AAO x 4, CN II-XI in tact, nl sensation, nl strength/tone    No asterixis   Skin: warm, dry   + jaundice, + significant spider angiomas, +bruising   Psych: normal mood and affect, normal behavior, thought content and judgement.    Labs:    Chemistries:   Recent Labs   Lab 07/03/19  0342 07/04/19  0502 07/05/19  0431   * 139 134*   K 3.8 3.5 3.4*    103 99   CO2 22* 22* 23   BUN 41* 27* 24*   CREATININE 1.3 1.3 1.2   CALCIUM 10.1 10.0 9.5   PROT 6.3 6.1 5.9*   BILITOT 34.4* 38.5* 37.4*   ALKPHOS 207* 205* 192*   ALT 7* 6* 7*   AST 54* 50* 42*   MG 2.4 2.1 2.0   PHOS 2.9 3.0 2.9        WBC:   Recent Labs   Lab 07/01/19  0526 07/02/19  0446 07/03/19  0343 07/04/19  0502 07/05/19  0431   WBC 14.56* 16.47* 17.93* 18.43* 18.84*     Bands:     CBC/Anemia Labs: Coags:    Recent Labs   Lab 07/03/19 0343 07/04/19  0502 07/05/19  0431   WBC 17.93* 18.43* 18.84*   HGB 8.5* 8.7* 7.9*   HCT 23.8* 25.3* 22.9*   PLT 70* 73* 68*   MCV 97 100* 97   RDW 18.3* 18.0* 18.1*    Recent Labs   Lab 07/03/19  0343 07/04/19  0502 07/05/19  0431   INR 2.4* 2.4* 2.7*        POCT Glucose: HbA1c:    Recent Labs   Lab 07/03/19  1126 07/03/19  2100 07/04/19  0717 07/04/19  1127 07/04/19  1634 07/04/19 2059   POCTGLUCOSE 140* 108 116* 106 83 95    No results found for: HGBA1C       Assessment and Plan     Hospital Course:    Ms. Destinee Gallagher is a 38 y.o. 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 [B78.9]  Yes    Coffee ground emesis [K92.0]  Yes    Upper GI bleed [K92.2]  No    Acute blood loss anemia [D62]  No    Hepatorenal syndrome [K76.7]  No    Acute alcoholic hepatitis [K70.10]  Yes    Ascites due to alcoholic hepatitis [K70.11]  Yes     Has had hx of SBP , as per family       Hepatic encephalopathy [K72.90]  Yes    Coagulopathy [D68.9]  Yes    Hyponatremia [E87.1]  Yes    Alcohol use disorder, severe, dependence [F10.20]  Yes    Macrocytic anemia [D53.9]  Yes    Leukocytosis [D72.829]  Yes    Hypotension [I95.9]  Yes    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     Acute liver failure without hepatic coma  Acute alcoholic hepatitis with ascites  Alcoholic cirrhosis of liver with ascites     MELD-Na score: 34 at 7/5/2019  4:31 AM  MELD score: 33 at 7/5/2019  4:31 AM  Calculated from:  Serum Creatinine: 1.2 mg/dL at 7/5/2019  4:31 AM  Serum Sodium: 134 mmol/L at 7/5/2019  4:31 AM  Total Bilirubin: 37.4 mg/dL at 7/5/2019  4:31 AM  INR(ratio): 2.7 at 7/5/2019  4:31 AM  Age: 38 years     - decompensation due to recent alcohol binge drinking.  Meld score of 35 on admission  - hepatology consulted.  Obtaining financial approval for initiation of inpatient liver transplant evaluation  - [facility] 107  - viral and autoimmune markers negative  - LVP on 06/26/2019,  3.4 L drained.  No SBP found on ascitic fluid.  Needs at least p.o. ciprofloxacin prophylaxis given history of SBP in the  past  - treat hypotension with midodrine.   - p.o. Lactulose and PO rifaximin for hepatic encephalopathy  - IV vitamin K for coagulopathy  - Psychiatry consulted on the case. deemed patient high risk, needs ETOH rehab  - serology for liver transplant evaluation -ordered   - SBP ppx with PO cipro as patient has had hx of SBP  - patient with decompensation on 06/27/2019 and some of the pre liver transplant testing and imaging was deferred that day. Restarted on 6/28. Needs GYN, ID, mammo, LTS, , ID + strongyloides and will need therapy   - echo stress negative for ischemia  - patient declined for liver transplant; patient however may recover from this as LFTs are improving      Upper GI bleed  Acute blood loss anemia  - patient with coffee-ground emesis on 06/27/2019 with hemoglobin decreased from 10-8.8.  May be due to portal hypertensive gastropathy, however given history of cirrhosis, there is definitely concern for variceal bleeding  - patient started on IV ceftriaxone for SBP prophylaxis in lieu of GI bleeding  - started on IV octreotide drip and  IV PPI b.i.d. On 6/27. Planned  for EGD on 06/28/2019 but procedure was deferred due to high chance of decompensation  - will plan to transfuse for hemoglobin likely <8  - 1 unit of FFP transfusion on 06/27/2019 given significant coagulopathy with INR greater than 2 and active bleeding  - IV oct gtt and IV ppi stopped on 6/28 , IV ceftriaxone de-escalated to Cipro prophylaxis.  Plan to monitor for further bleeding and reassess for necessity for EGD  - 1 u prbcs given on 7/1 for decreasing hbg down to 7.2.  patient may require an EGD       Hepatorenal syndrome  - creatinine increasing to 2.1 on 06/27/2019.  Creatinine on admission 1.3, patient was receiving diuretics at outside hospital.  Urine sodium is less than 20, with ascites and hypotension.  Which is highly highly concerning for hepatorenal syndrome  - continue max dose midodrine 15 t.i.d.  - patient has been on  subcu octreotide and transition to octreotide drip for upper GI bleeding  - ICU consulted on 06/27/2019 given tenuous blood pressures, worsening encephalopathy, worsening renal failure, significant hyponatremia,  upper GI bleeding.  Patient will need close monitoring and possibly transfer for pressors to the ICU, when deemed appropriate by the ICU team  - cont midodrine, IV octreotide and albumin.   - 6/29 - Nephrology consulted. ICU consulted for IV pressors in setting of HRS and low BPs. Nephrology recommended current regimen and no IV pressors at this time   - IV albumin stopped 7/1 due to Cr down to 1.3 and normal serum albumin     Acute on Chronic hepatic encephalopathy  - PO lactulose  - PO rifaximin  - monitor BMs, goal of 3-4 . Monitor mental status      Alcoh ol use disorder, severe, dependence  - with history of alcoholic cirrhosis and alcoholic hepatitis leading to acute liver failure  - urine toxicology ordered  - [facility] 107  - Psychiatry consulted on the case- deemed patient high risk, needs ETOH rehab  - at one point, Patient explicitly stated that she does not need Alcohol Rehab . She stated that her current problem is due to alcohol binge earlier in May due to problems in personal life and associated anxiety with that.  As per , patient has underwent multiple rehabs in the past and relapsed.  The longest patient could ever go without relapsing was 1 year in early 2000s     Hyponatremia  - sodium of 118 on admission, patient has been getting diuretics at outside hospital.  Likely hypervolemic hyponatremia in setting of decompensated cirrhosis and volume overload  - hold diuretics at this time  - given hypotension hyponatremia and relatively normal potassium, AM cortisol level checked and noted to be at 7.8.   - cosyntropin stim test to evaluated for adrenal insufficiency - test was not optimally performed and showed unlikely adrenal insufficiency   - fluid restriction to 1 L per  day     Hypotension  - likely due to liver failure and decompensated hepatic cirrhosis  - patient started on midodrine 5 mg t.i.d.--> increased to 15 t.i.d. given very tenuous blood pressures  -very low threshold for  transfer to ICU for IV pressors     Leukocytosis  - WBC count of 23 on admission.  Very likely due to alcoholic hepatitis.  WBC of 21 down trending from 20/6 at outside hospital  - no SBP and cx remains ngtd  - antibiotics as above  - continue monitor     Anasarca  - likely due to liver failure and decompensated hepatic cirrhosis  - 2D echo with 65 % and Estimated PA pressure is at least 36 mmHg.     Macrocytic Anemia  Thrombocytopenia  Coagulopathy  - hemoglobin of 10 with MCV of 100 on admission.  - platelets of 54 on admission.  - INR of 2.4 on admission likely due to liver failure  - administer IV vitamin K x3 days for coagulopathy-- INR remains 2.4 despite vitamin K  - monitor platelets and hemoglobin daily  - DIC and hemolysis studies negative  - 1 u prbcs given on 7/1 for decreasing hbg down to 7.2.   - patient may require an EGD     Tobacco use disorder  - patient is a current every day smoker  - will assess for necessity of nicotine patch    # MDD  - psych evaluated patient and does not think patient has bipolar disorder and think she just has MDD; no medications needed for bipolar      Diet:  Low Na, fluid restriction   GI PPx:  ppi  DVT PPx:  scds  Goals of Care:  full      High Risk Conditions:  Liver failure     Disposition:       Patient declined for liver transplant, however may actually recover; needs weekly labs and abstinence from alcohol

## 2019-07-06 NOTE — DISCHARGE SUMMARY
Discharge Summary  Hospital Medicine    Patient Name: Destinee Gallagher  MRN:  [id number]  Hospital Medicine [place] [facility] Darrick Knapp MD  Date of Admission:  6/26/2019     Date of Discharge:  07/06/2019  Length of Stay:  LOS: 9 days   Principal Problem:  Acute liver failure without hepatic coma     Active Hospital Problems    Diagnosis  POA    *Acute liver failure without hepatic coma [K72.00]  Yes    Chronic insomnia [F51.04]  Yes    Organ transplant candidate [Z76.82]  Not Applicable    Pre-transplant evaluation for liver transplant [Z01.818]  Not Applicable    Infection due to strongyloides [B78.9]  Yes    Coffee ground emesis [K92.0]  Yes    Upper GI bleed [K92.2]  No    Acute blood loss anemia [D62]  No    Hepatorenal syndrome [K76.7]  No    Acute alcoholic hepatitis [K70.10]  Yes    Ascites due to alcoholic hepatitis [K70.11]  Yes     Has had hx of SBP , as per family       Hepatic encephalopathy [K72.90]  Yes    Coagulopathy [D68.9]  Yes    Hyponatremia [E87.1]  Yes    Alcohol use disorder, severe, dependence [F10.20]  Yes    Macrocytic anemia [D53.9]  Yes    Leukocytosis [D72.829]  Yes    Hypotension [I95.9]  Yes    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       History of Present Illness:      Ms. Destinee Gallagher is a 38 y.o. female with hx of ETOH cirrhosis and ETOH hepatitis, hx of alcohol abuse disorder/ severe alcohol dependence, prior history of coagulopathy and thrombocytopenia, as well as history of SBP, [place] [facility] [place] from  [facility] in [place], [place], for liver transplantation evaluation as well as hepatology evaluation. Patient was admitted to OSH 6/6 with  abdominal pain, nausea, decreased oral intake, and visibly jaundice.  Over past 2.5 weeks there , pt has been diagnosed with and treated for SBP.  She has been continued on diuretics, but ascites has been resistant to diuretics , requiring multiple paracenteses (last LVP several days prior to transfer).  Most recent paracentesis did not meet criteria for SBP, and had no PMNs, but pt remained on Rocephin for leukocytosis of unclear source.  She has been encephalopathic but slowly improving with increased doses of Lactulose and Rifaxamin.  Pt has not improved with current treatment, thus requesting liver transplant evaluation, Case d/w Dr. Posadas, patient transferred to [facility]. MELD 35 upon presentation at [facility]. Last ETOH drink on 6/3     Upon presentation at [facility], patient was afebrile with systolic blood pressures in the 90s satting 95% on room air.  She was mildly encephalopathic, however was able to participate in conversation,  at bedside.  Meld 35 on presentation.  Labs notable for total bilirubin of 28, sodium of 118 (patient has been getting in diuretics at the outside hospital), INR of 2.4.  WBC of 23, platelets 54.      Upon further questioning regarding patient's disease process, she stated that she has had history of heavy alcohol use, most recently 1-2 pt of vodka per day.  Patient's last alcoholic drink was on 06/03/2019, approximately.  Patient did not state that she has been binge drinking, as noted above 2 pt of vodka per day, prior to her admission at outside hospital.  Patient also uses tobacco.  She stated that she was 1st diagnosed with alcoholic cirrhosis and alcoholic hepatitis in August 2018 and was advised to stop drinking at that time.  Patient has had multiple relapses since then.  It appears the patient has failed multiple alcohol rehabs in the past.  In the last couple of months patient has had recurrent problem with worsening ascites and abdominal distention.    Hospital Course of  Principle Problem Addressed:       Acute liver failure without hepatic coma  Acute alcoholic hepatitis with ascites  Alcoholic cirrhosis of liver with ascites     MELD-Na score: 34 at 7/5/2019  4:31 AM  MELD score: 33 at 7/5/2019  4:31 AM  Calculated from:  Serum Creatinine: 1.2 mg/dL at 7/5/2019  4:31 AM  Serum Sodium: 134 mmol/L at 7/5/2019  4:31 AM  Total Bilirubin: 37.4 mg/dL at 7/5/2019  4:31 AM  INR(ratio): 2.7 at 7/5/2019  4:31 AM  Age: 38 years     - decompensation due to recent alcohol binge drinking.  Meld score of 35 on admission  - hepatology consulted.  Obtaining financial approval for initiation of inpatient liver transplant evaluation  - [facility] 107  - viral and autoimmune markers negative  - LVP on 06/26/2019,  3.4 L drained.  No SBP found on ascitic fluid.  Needs at least p.o. ciprofloxacin prophylaxis given history of SBP in the past  - treat hypotension with midodrine.   - p.o. Lactulose and PO rifaximin for hepatic encephalopathy  - IV vitamin K for coagulopathy  - Psychiatry consulted on the case. deemed patient high risk, needs ETOH rehab  - serology for liver transplant evaluation -ordered   - SBP ppx with PO cipro as patient has had hx of SBP  - patient with decompensation on 06/27/2019 and some of the pre liver transplant testing and imaging was deferred that day. Restarted on 6/28. Needs GYN, ID, mammo, LTS, , ID + strongyloides and will need therapy   - echo stress negative for ischemia  - patient declined for liver transplant; patient however may recover from this as LFTs are improving       Upper GI bleed  Acute blood loss anemia  - patient with coffee-ground emesis on 06/27/2019 with hemoglobin decreased from 10-8.8.  May be due to portal hypertensive gastropathy, however given history of cirrhosis, there is definitely concern for variceal bleeding  - patient started on IV ceftriaxone for SBP prophylaxis in lieu of GI bleeding  - started on IV octreotide drip and  IV PPI b.i.d. On 6/27.  Planned  for EGD on 06/28/2019 but procedure was deferred due to high chance of decompensation  - will plan to transfuse for hemoglobin likely <8  - 1 unit of FFP transfusion on 06/27/2019 given significant coagulopathy with INR greater than 2 and active bleeding  - IV oct gtt and IV ppi stopped on 6/28 , IV ceftriaxone de-escalated to Cipro prophylaxis.  Plan to monitor for further bleeding and reassess for necessity for EGD  - 1 u prbcs given on 7/1 for decreasing hbg down to 7.2.  patient may require an EGD        Hepatorenal syndrome  - creatinine increasing to 2.1 on 06/27/2019.  Creatinine on admission 1.3, patient was receiving diuretics at outside hospital.  Urine sodium is less than 20, with ascites and hypotension.  Which is highly highly concerning for hepatorenal syndrome  - continue max dose midodrine 15 t.i.d.  - patient has been on subcu octreotide and transition to octreotide drip for upper GI bleeding  - ICU consulted on 06/27/2019 given tenuous blood pressures, worsening encephalopathy, worsening renal failure, significant hyponatremia,  upper GI bleeding.  Patient will need close monitoring and possibly transfer for pressors to the ICU, when deemed appropriate by the ICU team  - cont midodrine, IV octreotide and albumin.   - 6/29 - Nephrology consulted. ICU consulted for IV pressors in setting of HRS and low BPs. Nephrology recommended current regimen and no IV pressors at this time   - IV albumin stopped 7/1 due to Cr down to 1.3 and normal serum albumin      Acute on Chronic hepatic encephalopathy  - PO lactulose  - PO rifaximin  - monitor BMs, goal of 3-4 . Monitor mental status      Alcoh ol use disorder, severe, dependence  - with history of alcoholic cirrhosis and alcoholic hepatitis leading to acute liver failure  - urine toxicology ordered  - [facility]  - Psychiatry consulted on the case- deemed patient high risk, needs ETOH rehab  - at one point, Patient explicitly stated that she does not  need Alcohol Rehab . She stated that her current problem is due to alcohol binge earlier in May due to problems in personal life and associated anxiety with that.  As per , patient has underwent multiple rehabs in the past and relapsed.  The longest patient could ever go without relapsing was 1 year in early 2000s     Hyponatremia  - sodium of 118 on admission, patient has been getting diuretics at outside hospital.  Likely hypervolemic hyponatremia in setting of decompensated cirrhosis and volume overload  - hold diuretics at this time  - given hypotension hyponatremia and relatively normal potassium, AM cortisol level checked and noted to be at 7.8.   - cosyntropin stim test to evaluated for adrenal insufficiency - test was not optimally performed and showed unlikely adrenal insufficiency   - fluid restriction to 1 L per day     Hypotension  - likely due to liver failure and decompensated hepatic cirrhosis  - patient started on midodrine 5 mg t.i.d.--> increased to 15 t.i.d. given very tenuous blood pressures  -very low threshold for  transfer to ICU for IV pressors      Leukocytosis  - WBC count of 23 on admission.  Very likely due to alcoholic hepatitis.  WBC of 21 down trending from 20/6 at outside hospital  - no SBP and cx remains ngtd  - antibiotics as above  - continue monitor     Anasarca  - likely due to liver failure and decompensated hepatic cirrhosis  - 2D echo with 65 % and Estimated PA pressure is at least 36 mmHg.     Macrocytic Anemia  Thrombocytopenia  Coagulopathy  - hemoglobin of 10 with MCV of 100 on admission.  - platelets of 54 on admission.  - INR of 2.4 on admission likely due to liver failure  - administer IV vitamin K x3 days for coagulopathy-- INR remains 2.4 despite vitamin K  - monitor platelets and hemoglobin daily  - DIC and hemolysis studies negative  - 1 u prbcs given on 7/1 for decreasing hbg down to 7.2.   - patient may require an EGD     Tobacco use disorder  - patient is  a current every day smoker  - will assess for necessity of nicotine patch     # MDD  - psych evaluated patient and does not think patient has bipolar disorder and think she just has MDD; no medications needed for bipolar      Diet:  Low Na, fluid restriction   GI PPx:  ppi  DVT PPx:  scds  Goals of Care:  full        High Risk Conditions:  Liver failure      Disposition:       Patient declined for liver transplant, however may actually recover; needs weekly labs and abstinence from alcohol          Other Medical Problems Addressed in the Hospital:         Significant Diagnostic Tests/Imaging:     Recent Labs   Lab 07/01/19 0526 07/02/19 0446 07/03/19 0343 07/04/19  0502 07/05/19  0431   WBC 14.56* 16.47* 17.93* 18.43* 18.84*   HGB 7.2* 8.5* 8.5* 8.7* 7.9*   HCT 19.8* 24.6* 23.8* 25.3* 22.9*   PLT 57* 58* 70* 73* 68*     Recent Labs   Lab 07/01/19 0526 07/02/19 0445 07/03/19 0342 07/04/19  0502 07/05/19  0431   * 135* 135* 139 134*   K 3.9 3.5 3.8 3.5 3.4*   CL 98 100 101 103 99   CO2 20* 20* 22* 22* 23   BUN 59* 52* 41* 27* 24*   CREATININE 2.1* 1.6* 1.3 1.3 1.2   CALCIUM 10.1 10.1 10.1 10.0 9.5   MG 2.6 2.6 2.4 2.1 2.0   PHOS 4.4 3.6 2.9 3.0 2.9   PROT 6.3 6.4 6.3 6.1 5.9*   BILITOT 29.1* 31.4* 34.4* 38.5* 37.4*   ALKPHOS 225* 214* 207* 205* 192*   ALT 10 8* 7* 6* 7*   AST 81* 66* 54* 50* 42*         Special Treatments/Procedures:         Discharge Medications:      Discharge Medication List as of 7/5/2019  5:01 PM      START taking these medications    Details   ciprofloxacin HCl (CIPRO) 500 MG tablet Take 1 tablet (500 mg total) by mouth once daily., Starting Fri 7/5/2019, Normal      folic acid (FOLVITE) 1 MG tablet Take 1 tablet (1 mg total) by mouth once daily., Starting Sat 7/6/2019, Until Mon 8/5/2019, Normal      hydrOXYzine pamoate (VISTARIL) 25 MG Cap Take 1 capsule (25 mg total) by mouth every 6 (six) hours as needed., Starting Fri 7/5/2019, Normal      lactulose (CHRONULAC) 10 gram/15 mL  solution Take 30 mLs (20 g total) by mouth 3 (three) times daily., Starting Fri 7/5/2019, Until Sun 8/4/2019, Normal      midodrine (PROAMATINE) 5 MG Tab Take 3 tablets (15 mg total) by mouth 3 (three) times daily., Starting Fri 7/5/2019, Until Sat 7/4/2020, Normal      pantoprazole (PROTONIX) 40 MG tablet Take 1 tablet (40 mg total) by mouth 2 (two) times daily before meals., Starting Fri 7/5/2019, Until Sat 7/4/2020, Normal      promethazine (PHENERGAN) 12.5 MG Tab Take 1 tablet (12.5 mg total) by mouth every 6 (six) hours as needed., Starting Fri 7/5/2019, Normal      ramelteon (ROZEREM) 8 mg tablet Take 1 tablet (8 mg total) by mouth nightly as needed for Insomnia., Starting Fri 7/5/2019, Normal      thiamine 100 MG tablet Take 1 tablet (100 mg total) by mouth once daily., Starting Sat 7/6/2019, OTC      traMADol (ULTRAM) 50 mg tablet Take 1 tablet (50 mg total) by mouth every 6 (six) hours as needed., Starting Fri 7/5/2019, Print         CONTINUE these medications which have NOT CHANGED    Details   multivitamin (THERAGRAN) per tablet Take 1 tablet by mouth once daily., Historical Med      omeprazole (PRILOSEC) 20 MG capsule Take 20 mg by mouth once daily., Historical Med         STOP taking these medications       magnesium citrate solution Comments:   Reason for Stopping:         spironolactone (ALDACTONE) 100 MG tablet Comments:   Reason for Stopping:               Discharge Diet:2 gram sodium diet    Activity: activity as tolerated    Discharge Condition: Stable    Disposition: Home or Self Care    Time spent on the discharge of the patient including review of hospital course with the patient. reviewing discharge medications and arranging follow-up care 35 minutes.  Patient was seen and examined on the date of discharge and determined to be suitable for discharge.    No future appointments.    Darrick Knapp MD  Medical Director [facility] Medicine  Spectra:  [phone]  Pager: [phone]

## 2019-07-08 LAB
BACTERIA BLD CULT: NORMAL
BACTERIA BLD CULT: NORMAL

## 2019-07-09 ENCOUNTER — PATIENT OUTREACH (OUTPATIENT)
Dept: ADMINISTRATIVE | Facility: CLINIC | Age: 39
End: 2019-07-09

## 2019-07-09 NOTE — PROGRESS NOTES
C3 nurse attempted to contact patient. No answer. The following message was left for the patient to return the call:  Good evening I am a nurse calling on behalf of [facility] from the Care Coordination Center.  This is a Transitional Care Call for Destinee. When you have a moment please contact us at [phone] or [phone] Monday through Friday, between the hours of 8 am to 4 pm. We look forward to speaking with you. On behalf of [facility] have a nice day.    The patient does not have a scheduled HOSFU appointment within 7-14 days post hospital discharge date 07/5/19. Non [facility] PCP.

## 2019-07-15 ENCOUNTER — TELEPHONE (OUTPATIENT)
Dept: OBSTETRICS AND GYNECOLOGY | Facility: HOSPITAL | Age: 39
End: 2019-07-15

## 2019-07-15 NOTE — TELEPHONE ENCOUNTER
Called pt to discuss path insufficient from pap. Did not . Family member called, informed of insufficient pap and recommendation to repeat in 4-6 months. Voiced understanding, will tell pt.    Sushma K. Reddy, MD  PGY-3, OBGYN

## 2019-07-16 ENCOUNTER — TELEPHONE (OUTPATIENT)
Dept: PHARMACY | Facility: CLINIC | Age: 39
End: 2019-07-16

## 2021-10-07 NOTE — CONSULTS
[facility]  Critical Care Medicine  Consult Note    Patient Name: Destinee Gallagher  MRN: [id number]  Admission Date: 2019  Hospital Length of Stay: 3 days  Code Status: Full Code  Attending Physician: Marisa Banks MD   Primary Care Provider: Harsha Larson   Principal Problem: Acute liver failure without hepatic coma    Consults  Subjective:     HPI:  38F with hx EtOH cirrhosis, hx EtOH abuse disorder/dependence, hx SBP transferred to [facility] for liver transplantation eval / hepatology consultation in the setting of decompensated cirrhosis. Admitted on  with mild encephalopathy, sodium of 118, MELD 35. Critical Care Medicine was consulted on  after pt had 2x episodes of small volume emesis (100-200 cc) with coffee grounds concerning for GIB as well as change in pt's mental status and hypotension. Patient was deemed stable at that time to remain on the floor. Critical care medicine was contacted again on  for rising Cr with MELD up to 40 with possible need for IV pressors. After evaluation by nephrology, decision was made that patient currently does not need pressors or ICU admission.    Hospital/ICU Course:  No notes on file    Past Medical History:   Diagnosis Date    Acute alcoholic hepatitis 2019    Acute liver failure without hepatic coma 2019    Alcohol use disorder, severe, dependence 2019    Coagulopathy 2019    Hepatic encephalopathy 2019    Hyponatremia 2019    Thrombocytopenia due to hypersplenism 2019       Past Surgical History:   Procedure Laterality Date     SECTION      CHOLECYSTECTOMY         Review of patient's allergies indicates:   Allergen Reactions    Zofran [ondansetron hcl (pf)]      headache       Family History     None        Tobacco Use    Smoking status: Current Every Day Smoker     Packs/day: 1.00     Years: 15.00     Pack years: 15.00     Types: Cigarettes  Received request via: Pharmacy    Was the patient seen in the last year in this department? Yes    Does the patient have an active prescription (recently filled or refills available) for medication(s) requested? No     Substance and Sexual Activity    Alcohol use: Not Currently     Comment: 6/1/19 last drink    Drug use: Never    Sexual activity: Yes     Partners: Female     Birth control/protection: None      Review of Systems   Constitutional: Negative for chills, diaphoresis and fever.   HENT: Negative for congestion, rhinorrhea, sneezing and sore throat.    Eyes: Negative for photophobia and visual disturbance.   Respiratory: Negative for cough, chest tightness and shortness of breath.    Cardiovascular: Negative for chest pain, palpitations and leg swelling.   Gastrointestinal: Positive for abdominal distention. Negative for abdominal pain, constipation, diarrhea, nausea and vomiting.   Genitourinary: Negative for dysuria.   Musculoskeletal: Negative for back pain.   Neurological: Negative for light-headedness and headaches.   Psychiatric/Behavioral: Negative for self-injury, sleep disturbance and suicidal ideas. The patient is not nervous/anxious.      Objective:     Vital Signs (Most Recent):  Temp: 97.6 °F (36.4 °C) (06/29/19 1534)  Pulse: 77 (06/29/19 1542)  Resp: 18 (06/29/19 1534)  BP: 112/63 (06/29/19 1534)  SpO2: 97 % (06/29/19 1534) Vital Signs (24h Range):  Temp:  [97.1 °F (36.2 °C)-98 °F (36.7 °C)] 97.6 °F (36.4 °C)  Pulse:  [76-85] 77  Resp:  [16-18] 18  SpO2:  [91 %-98 %] 97 %  BP: ()/(52-64) 112/63   Weight: 60.3 kg (133 lb)  Body mass index is 22.13 kg/m².      Intake/Output Summary (Last 24 hours) at 6/29/2019 1703  Last data filed at 6/29/2019 1100  Gross per 24 hour   Intake 240 ml   Output 1001 ml   Net -761 ml       Physical Exam   Constitutional: She is oriented to person, place, and time. She appears well-developed and well-nourished. No distress.   Jaundiced   HENT:   Head: Normocephalic and atraumatic.   Nose: Nose normal.   Eyes: Pupils are equal, round, and reactive to light. EOM are normal.   Cardiovascular: Normal rate, regular rhythm and intact distal pulses.   Pulmonary/Chest: Effort  normal. No stridor. No respiratory distress.   Abdominal: Soft. She exhibits no distension. There is no tenderness.   +ascites    Neurological: She is oriented to person, place, and time. No cranial nerve deficit.   Skin: Skin is warm and dry. Capillary refill takes less than 2 seconds. She is not diaphoretic.   Psychiatric: She has a normal mood and affect. Her behavior is normal. Judgment and thought content normal.   Vitals reviewed.      Vents:     Lines/Drains/Airways     Drain                 Urethral Catheter 06/28/19 1555 Latex 16 Fr. 1 day          Peripheral Intravenous Line                 Midline Catheter Insertion/Assessment  - Single Lumen 06/26/19 1125 Right brachial vein 18g x 10cm 3 days         Peripheral IV - Single Lumen 06/28/19 1115 20 G;1 3/4 in Left Upper Arm 1 day              Significant Labs:    CBC/Anemia Profile:  Recent Labs   Lab 06/28/19  1704 06/29/19  0353 06/29/19  1532   WBC 20.38* 20.61* 20.65*   HGB 8.0* 8.2* 8.0*   HCT 21.8* 22.1* 21.7*   PLT 54* 62* 68*   MCV 96 96 96   RDW 16.9* 16.7* 16.8*        Chemistries:  Recent Labs   Lab 06/28/19  0424 06/28/19  1704 06/29/19  0353 06/29/19  1532   *  119* 118* 123* 126*   K 4.2  4.2 4.2 4.5 4.2   CL 89*  89* 88* 92* 95   CO2 16*  16* 18* 16* 15*   BUN 72*  72* 74* 73* 70*   CREATININE 2.2*  2.2* 2.9* 3.0* 2.8*   CALCIUM 8.9  8.9 9.4 9.2 9.8   ALBUMIN 2.6*  --  3.1*  --    PROT 5.0*  --  5.5*  --    BILITOT 24.9*  --  27.3*  --    ALKPHOS 245*  --  254*  --    ALT 12  --  12  --    AST 88*  --  97*  --    MG 2.1  --  2.5  --    PHOS 6.3*  --  6.6*  --        ABG  No results for input(s): PH, PO2, PCO2, HCO3, BE in the last 168 hours.  Assessment/Plan:     GI  Alcoholic cirrhosis of liver with ascites  Decompensated Cirrhosis  Hyponatremia  Oliguria  Coffee Ground Emesis  Hepatic Encephalopathy    Assessment:  Admitted with decompensated cirrhosis for Hepatology / Transplant Evaluation. Course c/b persistent hyponatremia  and refractory ascites despite diuretics (currently being held - suspected GIB, sodium previously responsive to hypertonic saline at OSH), coffee ground emesis (without hemodynamic instability), suspected HRS. MICU now consulted for rising Cr with MELD up to 40 with possible need for IV pressors. After evaluation by nephrology, decision was made that patient currently does not need pressors or ICU admission.    Currently hemodynamically stable. Creatine improving with adequate UOP. Continue midodrine, albumin and octreotide for blood pressure support.     Recommendations  - Continue current therapies. If pt continues to decline or becomes unstable, please do not hesitate the re-consult        Thank you for your consult. I will sign off. Please contact us if you have any additional questions.     Mecca Stewart MD  Critical Care Medicine  [facility]